# Patient Record
Sex: MALE | Race: WHITE | Employment: OTHER | URBAN - METROPOLITAN AREA
[De-identification: names, ages, dates, MRNs, and addresses within clinical notes are randomized per-mention and may not be internally consistent; named-entity substitution may affect disease eponyms.]

---

## 2017-07-28 ENCOUNTER — HOSPITAL ENCOUNTER (OUTPATIENT)
Dept: MRI IMAGING | Age: 72
Discharge: HOME | End: 2017-07-28
Attending: GENERAL PRACTICE | Admitting: GENERAL PRACTICE

## 2017-07-28 ENCOUNTER — NURSE ONLY (OUTPATIENT)
Dept: CARDIOLOGY CLINIC | Age: 72
End: 2017-07-28

## 2017-07-28 DIAGNOSIS — I10 ESSENTIAL HYPERTENSION: Primary | ICD-10-CM

## 2017-07-28 DIAGNOSIS — E78.5 HYPERLIPIDEMIA, UNSPECIFIED HYPERLIPIDEMIA TYPE: ICD-10-CM

## 2017-07-28 DIAGNOSIS — R42 VERTIGO: ICD-10-CM

## 2017-07-28 PROCEDURE — 93225 XTRNL ECG REC<48 HRS REC: CPT | Performed by: INTERNAL MEDICINE

## 2017-08-03 PROCEDURE — 93227 XTRNL ECG REC<48 HR R&I: CPT | Performed by: INTERNAL MEDICINE

## 2017-08-10 ENCOUNTER — TELEPHONE (OUTPATIENT)
Dept: CARDIOLOGY CLINIC | Age: 72
End: 2017-08-10

## 2017-08-22 ENCOUNTER — TELEPHONE (OUTPATIENT)
Dept: CARDIOLOGY CLINIC | Age: 72
End: 2017-08-22

## 2017-11-23 PROBLEM — I82.401 ACUTE DEEP VEIN THROMBOSIS (DVT) OF RIGHT LOWER EXTREMITY (HCC): Status: ACTIVE | Noted: 2017-11-23

## 2017-11-25 PROBLEM — L03.211 FACIAL CELLULITIS: Status: ACTIVE | Noted: 2017-11-25

## 2017-11-25 PROBLEM — B02.21 HERPES ZOSTER OTICUS: Status: ACTIVE | Noted: 2017-11-25

## 2018-07-11 PROBLEM — R07.9 CHEST PAIN: Status: ACTIVE | Noted: 2018-07-11

## 2018-11-08 ENCOUNTER — HOSPITAL ENCOUNTER (OUTPATIENT)
Dept: MRI IMAGING | Age: 73
Discharge: HOME | End: 2018-11-08
Payer: MEDICARE

## 2018-11-08 DIAGNOSIS — M75.82 TENDINITIS OF LEFT ROTATOR CUFF: ICD-10-CM

## 2018-11-08 PROCEDURE — 73221 MRI JOINT UPR EXTREM W/O DYE: CPT

## 2019-03-06 ENCOUNTER — APPOINTMENT (OUTPATIENT)
Dept: CT IMAGING | Age: 74
DRG: 301 | End: 2019-03-06
Payer: MEDICARE

## 2019-03-06 ENCOUNTER — APPOINTMENT (OUTPATIENT)
Dept: GENERAL RADIOLOGY | Age: 74
DRG: 301 | End: 2019-03-06
Payer: MEDICARE

## 2019-03-06 ENCOUNTER — APPOINTMENT (OUTPATIENT)
Dept: ULTRASOUND IMAGING | Age: 74
DRG: 301 | End: 2019-03-06
Payer: MEDICARE

## 2019-03-06 ENCOUNTER — HOSPITAL ENCOUNTER (EMERGENCY)
Age: 74
Discharge: HOME | DRG: 301 | End: 2019-03-06
Attending: EMERGENCY MEDICINE
Payer: MEDICARE

## 2019-03-06 VITALS
OXYGEN SATURATION: 97 % | SYSTOLIC BLOOD PRESSURE: 133 MMHG | WEIGHT: 202 LBS | RESPIRATION RATE: 18 BRPM | BODY MASS INDEX: 30.62 KG/M2 | HEIGHT: 68 IN | DIASTOLIC BLOOD PRESSURE: 70 MMHG | TEMPERATURE: 99.3 F | HEART RATE: 72 BPM

## 2019-03-06 DIAGNOSIS — M79.601 PAIN OF RIGHT UPPER EXTREMITY: ICD-10-CM

## 2019-03-06 DIAGNOSIS — M79.604 LEG PAIN, DIFFUSE, RIGHT: Primary | ICD-10-CM

## 2019-03-06 LAB
ALBUMIN SERPL-MCNC: 4 GM/DL (ref 3.4–5)
ALP BLD-CCNC: 71 IU/L (ref 40–129)
ALT SERPL-CCNC: 14 U/L (ref 10–40)
ANION GAP SERPL CALCULATED.3IONS-SCNC: 11 MMOL/L (ref 4–16)
APTT: 35 SECONDS (ref 21.2–33)
AST SERPL-CCNC: 14 IU/L (ref 15–37)
BASOPHILS ABSOLUTE: 0.3 K/CU MM
BASOPHILS RELATIVE PERCENT: 2.2 % (ref 0–1)
BILIRUB SERPL-MCNC: 0.5 MG/DL (ref 0–1)
BUN BLDV-MCNC: 8 MG/DL (ref 6–23)
CALCIUM SERPL-MCNC: 8.5 MG/DL (ref 8.3–10.6)
CHLORIDE BLD-SCNC: 92 MMOL/L (ref 99–110)
CO2: 29 MMOL/L (ref 21–32)
CREAT SERPL-MCNC: 0.9 MG/DL (ref 0.9–1.3)
DIFFERENTIAL TYPE: ABNORMAL
EOSINOPHILS ABSOLUTE: 0.8 K/CU MM
EOSINOPHILS RELATIVE PERCENT: 5.3 % (ref 0–3)
GFR AFRICAN AMERICAN: >60 ML/MIN/1.73M2
GFR NON-AFRICAN AMERICAN: >60 ML/MIN/1.73M2
GLUCOSE BLD-MCNC: 112 MG/DL (ref 70–99)
HCT VFR BLD CALC: 43.5 % (ref 42–52)
HEMOGLOBIN: 14.5 GM/DL (ref 13.5–18)
IMMATURE NEUTROPHIL %: 2.4 % (ref 0–0.43)
INR BLD: 1.26 INDEX
LYMPHOCYTES ABSOLUTE: 1 K/CU MM
LYMPHOCYTES RELATIVE PERCENT: 6.7 % (ref 24–44)
MCH RBC QN AUTO: 29.8 PG (ref 27–31)
MCHC RBC AUTO-ENTMCNC: 33.3 % (ref 32–36)
MCV RBC AUTO: 89.3 FL (ref 78–100)
MONOCYTES ABSOLUTE: 1 K/CU MM
MONOCYTES RELATIVE PERCENT: 6.7 % (ref 0–4)
NUCLEATED RBC %: 0 %
PDW BLD-RTO: 14.6 % (ref 11.7–14.9)
PLATELET # BLD: 369 K/CU MM (ref 140–440)
PMV BLD AUTO: 9.4 FL (ref 7.5–11.1)
POTASSIUM SERPL-SCNC: 3.8 MMOL/L (ref 3.5–5.1)
PROTHROMBIN TIME: 14.6 SECONDS (ref 9.12–12.5)
RBC # BLD: 4.87 M/CU MM (ref 4.6–6.2)
SEGMENTED NEUTROPHILS ABSOLUTE COUNT: 11.1 K/CU MM
SEGMENTED NEUTROPHILS RELATIVE PERCENT: 76.7 % (ref 36–66)
SODIUM BLD-SCNC: 132 MMOL/L (ref 135–145)
TOTAL IMMATURE NEUTOROPHIL: 0.35 K/CU MM
TOTAL NUCLEATED RBC: 0 K/CU MM
TOTAL PROTEIN: 6.4 GM/DL (ref 6.4–8.2)
TROPONIN T: <0.01 NG/ML
WBC # BLD: 14.4 K/CU MM (ref 4–10.5)

## 2019-03-06 PROCEDURE — 93971 EXTREMITY STUDY: CPT

## 2019-03-06 PROCEDURE — 36415 COLL VENOUS BLD VENIPUNCTURE: CPT

## 2019-03-06 PROCEDURE — 73090 X-RAY EXAM OF FOREARM: CPT

## 2019-03-06 PROCEDURE — 84484 ASSAY OF TROPONIN QUANT: CPT

## 2019-03-06 PROCEDURE — 85610 PROTHROMBIN TIME: CPT

## 2019-03-06 PROCEDURE — 71045 X-RAY EXAM CHEST 1 VIEW: CPT

## 2019-03-06 PROCEDURE — 6370000000 HC RX 637 (ALT 250 FOR IP): Performed by: EMERGENCY MEDICINE

## 2019-03-06 PROCEDURE — 80053 COMPREHEN METABOLIC PANEL: CPT

## 2019-03-06 PROCEDURE — 99284 EMERGENCY DEPT VISIT MOD MDM: CPT

## 2019-03-06 PROCEDURE — 93005 ELECTROCARDIOGRAM TRACING: CPT | Performed by: EMERGENCY MEDICINE

## 2019-03-06 PROCEDURE — 85730 THROMBOPLASTIN TIME PARTIAL: CPT

## 2019-03-06 PROCEDURE — 93010 ELECTROCARDIOGRAM REPORT: CPT | Performed by: INTERNAL MEDICINE

## 2019-03-06 PROCEDURE — 70450 CT HEAD/BRAIN W/O DYE: CPT

## 2019-03-06 PROCEDURE — 85025 COMPLETE CBC W/AUTO DIFF WBC: CPT

## 2019-03-06 RX ORDER — HYDROCODONE BITARTRATE AND ACETAMINOPHEN 5; 325 MG/1; MG/1
1 TABLET ORAL EVERY 4 HOURS PRN
Qty: 15 TABLET | Refills: 0 | Status: ON HOLD | OUTPATIENT
Start: 2019-03-06 | End: 2019-03-10 | Stop reason: HOSPADM

## 2019-03-06 RX ORDER — TRAMADOL HYDROCHLORIDE 50 MG/1
50 TABLET ORAL ONCE
Status: COMPLETED | OUTPATIENT
Start: 2019-03-06 | End: 2019-03-06

## 2019-03-06 RX ORDER — HYDROCODONE BITARTRATE AND ACETAMINOPHEN 5; 325 MG/1; MG/1
1 TABLET ORAL ONCE
Status: COMPLETED | OUTPATIENT
Start: 2019-03-06 | End: 2019-03-06

## 2019-03-06 RX ADMIN — TRAMADOL HYDROCHLORIDE 50 MG: 50 TABLET, FILM COATED ORAL at 17:56

## 2019-03-06 RX ADMIN — HYDROCODONE BITARTRATE AND ACETAMINOPHEN 1 TABLET: 5; 325 TABLET ORAL at 20:36

## 2019-03-06 ASSESSMENT — PAIN DESCRIPTION - ORIENTATION: ORIENTATION: RIGHT

## 2019-03-06 ASSESSMENT — PAIN SCALES - GENERAL
PAINLEVEL_OUTOF10: 8

## 2019-03-06 ASSESSMENT — PAIN DESCRIPTION - PAIN TYPE: TYPE: ACUTE PAIN

## 2019-03-08 ENCOUNTER — HOSPITAL ENCOUNTER (INPATIENT)
Age: 74
LOS: 2 days | Discharge: HOME | DRG: 301 | End: 2019-03-10
Attending: INTERNAL MEDICINE | Admitting: INTERNAL MEDICINE
Payer: MEDICARE

## 2019-03-08 DIAGNOSIS — M79.601 PAIN OF RIGHT UPPER EXTREMITY: ICD-10-CM

## 2019-03-08 DIAGNOSIS — M79.604 LEG PAIN, DIFFUSE, RIGHT: ICD-10-CM

## 2019-03-08 PROBLEM — I82.409 RECURRENT DEEP VENOUS THROMBOSIS (HCC): Status: ACTIVE | Noted: 2019-03-08

## 2019-03-08 PROCEDURE — 6360000002 HC RX W HCPCS: Performed by: INTERNAL MEDICINE

## 2019-03-08 PROCEDURE — 2140000000 HC CCU INTERMEDIATE R&B

## 2019-03-08 RX ORDER — METOPROLOL SUCCINATE 25 MG/1
25 TABLET, EXTENDED RELEASE ORAL DAILY
Status: DISCONTINUED | OUTPATIENT
Start: 2019-03-09 | End: 2019-03-10 | Stop reason: HOSPADM

## 2019-03-08 RX ORDER — HYDROCHLOROTHIAZIDE 25 MG/1
25 TABLET ORAL DAILY
Status: DISCONTINUED | OUTPATIENT
Start: 2019-03-09 | End: 2019-03-10 | Stop reason: HOSPADM

## 2019-03-08 RX ORDER — PROBENECID 500 MG/1
500 TABLET, FILM COATED ORAL DAILY
Status: DISCONTINUED | OUTPATIENT
Start: 2019-03-09 | End: 2019-03-10 | Stop reason: HOSPADM

## 2019-03-08 RX ORDER — SODIUM CHLORIDE 0.9 % (FLUSH) 0.9 %
10 SYRINGE (ML) INJECTION EVERY 12 HOURS SCHEDULED
Status: DISCONTINUED | OUTPATIENT
Start: 2019-03-08 | End: 2019-03-10 | Stop reason: HOSPADM

## 2019-03-08 RX ORDER — AMLODIPINE BESYLATE 2.5 MG/1
2.5 TABLET ORAL DAILY
Status: DISCONTINUED | OUTPATIENT
Start: 2019-03-09 | End: 2019-03-10 | Stop reason: HOSPADM

## 2019-03-08 RX ORDER — PROBENECID AND COLCHICINE 500; .5 MG/1; MG/1
1 TABLET ORAL DAILY
Status: DISCONTINUED | OUTPATIENT
Start: 2019-03-08 | End: 2019-03-08 | Stop reason: CLARIF

## 2019-03-08 RX ORDER — PREDNISONE 10 MG/1
10 TABLET ORAL DAILY
Status: ON HOLD | COMMUNITY
End: 2019-03-10 | Stop reason: HOSPADM

## 2019-03-08 RX ORDER — COLCHICINE 0.6 MG/1
0.6 TABLET ORAL DAILY
Status: DISCONTINUED | OUTPATIENT
Start: 2019-03-09 | End: 2019-03-10 | Stop reason: HOSPADM

## 2019-03-08 RX ORDER — ONDANSETRON 2 MG/ML
4 INJECTION INTRAMUSCULAR; INTRAVENOUS EVERY 6 HOURS PRN
Status: DISCONTINUED | OUTPATIENT
Start: 2019-03-08 | End: 2019-03-10 | Stop reason: HOSPADM

## 2019-03-08 RX ORDER — POTASSIUM CHLORIDE 20 MEQ/1
20 TABLET, EXTENDED RELEASE ORAL 2 TIMES DAILY
Status: DISCONTINUED | OUTPATIENT
Start: 2019-03-08 | End: 2019-03-10 | Stop reason: HOSPADM

## 2019-03-08 RX ORDER — CARBAMAZEPINE 200 MG/1
200 TABLET ORAL 3 TIMES DAILY
Status: ON HOLD | COMMUNITY
End: 2020-08-09

## 2019-03-08 RX ORDER — HYDROCODONE BITARTRATE AND ACETAMINOPHEN 5; 325 MG/1; MG/1
1 TABLET ORAL EVERY 4 HOURS PRN
Status: DISCONTINUED | OUTPATIENT
Start: 2019-03-08 | End: 2019-03-10 | Stop reason: HOSPADM

## 2019-03-08 RX ORDER — SODIUM CHLORIDE 0.9 % (FLUSH) 0.9 %
10 SYRINGE (ML) INJECTION PRN
Status: DISCONTINUED | OUTPATIENT
Start: 2019-03-08 | End: 2019-03-10 | Stop reason: HOSPADM

## 2019-03-08 RX ORDER — PRAMIPEXOLE DIHYDROCHLORIDE 0.25 MG/1
0.5 TABLET ORAL DAILY
Status: DISCONTINUED | OUTPATIENT
Start: 2019-03-09 | End: 2019-03-10 | Stop reason: HOSPADM

## 2019-03-08 RX ORDER — INDOMETHACIN 25 MG/1
25 CAPSULE ORAL
Status: ON HOLD | COMMUNITY
End: 2019-03-10 | Stop reason: HOSPADM

## 2019-03-08 RX ADMIN — ENOXAPARIN SODIUM 90 MG: 100 INJECTION SUBCUTANEOUS at 21:28

## 2019-03-09 PROCEDURE — 6370000000 HC RX 637 (ALT 250 FOR IP): Performed by: INTERNAL MEDICINE

## 2019-03-09 PROCEDURE — 2580000003 HC RX 258: Performed by: INTERNAL MEDICINE

## 2019-03-09 PROCEDURE — 99223 1ST HOSP IP/OBS HIGH 75: CPT | Performed by: INTERNAL MEDICINE

## 2019-03-09 PROCEDURE — 2140000000 HC CCU INTERMEDIATE R&B

## 2019-03-09 PROCEDURE — 6360000002 HC RX W HCPCS: Performed by: INTERNAL MEDICINE

## 2019-03-09 RX ORDER — BENZONATATE 100 MG/1
100 CAPSULE ORAL 3 TIMES DAILY PRN
Status: DISCONTINUED | OUTPATIENT
Start: 2019-03-09 | End: 2019-03-10 | Stop reason: HOSPADM

## 2019-03-09 RX ORDER — PANTOPRAZOLE SODIUM 40 MG/1
40 TABLET, DELAYED RELEASE ORAL
Status: DISCONTINUED | OUTPATIENT
Start: 2019-03-10 | End: 2019-03-09

## 2019-03-09 RX ORDER — PANTOPRAZOLE SODIUM 40 MG/1
40 TABLET, DELAYED RELEASE ORAL
Status: DISCONTINUED | OUTPATIENT
Start: 2019-03-09 | End: 2019-03-10 | Stop reason: HOSPADM

## 2019-03-09 RX ADMIN — SODIUM CHLORIDE, PRESERVATIVE FREE 10 ML: 5 INJECTION INTRAVENOUS at 10:12

## 2019-03-09 RX ADMIN — PROBENECID 500 MG: 500 TABLET, FILM COATED ORAL at 10:20

## 2019-03-09 RX ADMIN — SODIUM CHLORIDE, PRESERVATIVE FREE 10 ML: 5 INJECTION INTRAVENOUS at 21:53

## 2019-03-09 RX ADMIN — POTASSIUM CHLORIDE 20 MEQ: 20 TABLET, EXTENDED RELEASE ORAL at 10:11

## 2019-03-09 RX ADMIN — PRAMIPEXOLE DIHYDROCHLORIDE 0.5 MG: 0.25 TABLET ORAL at 10:12

## 2019-03-09 RX ADMIN — PANTOPRAZOLE SODIUM 40 MG: 40 TABLET, DELAYED RELEASE ORAL at 18:54

## 2019-03-09 RX ADMIN — METOPROLOL SUCCINATE 25 MG: 25 TABLET, EXTENDED RELEASE ORAL at 10:11

## 2019-03-09 RX ADMIN — BENZONATATE 100 MG: 100 CAPSULE ORAL at 21:57

## 2019-03-09 RX ADMIN — ENOXAPARIN SODIUM 90 MG: 100 INJECTION SUBCUTANEOUS at 21:52

## 2019-03-09 RX ADMIN — POTASSIUM CHLORIDE 20 MEQ: 20 TABLET, EXTENDED RELEASE ORAL at 21:52

## 2019-03-09 RX ADMIN — ENOXAPARIN SODIUM 90 MG: 100 INJECTION SUBCUTANEOUS at 10:11

## 2019-03-09 RX ADMIN — HYDROCHLOROTHIAZIDE 25 MG: 25 TABLET ORAL at 10:11

## 2019-03-09 RX ADMIN — COLCHICINE 0.6 MG: 0.6 TABLET, FILM COATED ORAL at 10:11

## 2019-03-09 RX ADMIN — AMLODIPINE BESYLATE 2.5 MG: 2.5 TABLET ORAL at 10:20

## 2019-03-09 RX ADMIN — BENZONATATE 100 MG: 100 CAPSULE ORAL at 15:46

## 2019-03-09 ASSESSMENT — PAIN SCALES - GENERAL
PAINLEVEL_OUTOF10: 0

## 2019-03-10 ENCOUNTER — APPOINTMENT (OUTPATIENT)
Dept: CT IMAGING | Age: 74
DRG: 301 | End: 2019-03-10
Attending: INTERNAL MEDICINE
Payer: MEDICARE

## 2019-03-10 VITALS
HEIGHT: 68 IN | TEMPERATURE: 98.7 F | HEART RATE: 71 BPM | SYSTOLIC BLOOD PRESSURE: 137 MMHG | DIASTOLIC BLOOD PRESSURE: 74 MMHG | WEIGHT: 200.4 LBS | OXYGEN SATURATION: 98 % | RESPIRATION RATE: 17 BRPM | BODY MASS INDEX: 30.37 KG/M2

## 2019-03-10 LAB
ALBUMIN SERPL-MCNC: 3.9 GM/DL (ref 3.4–5)
ALP BLD-CCNC: 76 IU/L (ref 40–129)
ALT SERPL-CCNC: 26 U/L (ref 10–40)
ANION GAP SERPL CALCULATED.3IONS-SCNC: 11 MMOL/L (ref 4–16)
AST SERPL-CCNC: 19 IU/L (ref 15–37)
BILIRUB SERPL-MCNC: 1 MG/DL (ref 0–1)
BUN BLDV-MCNC: 13 MG/DL (ref 6–23)
CALCIUM SERPL-MCNC: 8.2 MG/DL (ref 8.3–10.6)
CHLORIDE BLD-SCNC: 95 MMOL/L (ref 99–110)
CO2: 29 MMOL/L (ref 21–32)
CREAT SERPL-MCNC: 1 MG/DL (ref 0.9–1.3)
GFR AFRICAN AMERICAN: >60 ML/MIN/1.73M2
GFR NON-AFRICAN AMERICAN: >60 ML/MIN/1.73M2
GLUCOSE BLD-MCNC: 125 MG/DL (ref 70–99)
HOMOCYSTEINE: 19.1 UMOL/L (ref 0–10)
POTASSIUM SERPL-SCNC: 3.9 MMOL/L (ref 3.5–5.1)
SODIUM BLD-SCNC: 135 MMOL/L (ref 135–145)
TOTAL PROTEIN: 6.2 GM/DL (ref 6.4–8.2)

## 2019-03-10 PROCEDURE — 80053 COMPREHEN METABOLIC PANEL: CPT

## 2019-03-10 PROCEDURE — 6360000004 HC RX CONTRAST MEDICATION: Performed by: INTERNAL MEDICINE

## 2019-03-10 PROCEDURE — 85303 CLOT INHIBIT PROT C ACTIVITY: CPT

## 2019-03-10 PROCEDURE — 6370000000 HC RX 637 (ALT 250 FOR IP): Performed by: INTERNAL MEDICINE

## 2019-03-10 PROCEDURE — 99239 HOSP IP/OBS DSCHRG MGMT >30: CPT | Performed by: INTERNAL MEDICINE

## 2019-03-10 PROCEDURE — 81291 MTHFR GENE: CPT

## 2019-03-10 PROCEDURE — 86146 BETA-2 GLYCOPROTEIN ANTIBODY: CPT

## 2019-03-10 PROCEDURE — 85302 CLOT INHIBIT PROT C ANTIGEN: CPT

## 2019-03-10 PROCEDURE — 2580000003 HC RX 258: Performed by: INTERNAL MEDICINE

## 2019-03-10 PROCEDURE — 81240 F2 GENE: CPT

## 2019-03-10 PROCEDURE — 83090 ASSAY OF HOMOCYSTEINE: CPT

## 2019-03-10 PROCEDURE — 81241 F5 GENE: CPT

## 2019-03-10 PROCEDURE — 6360000002 HC RX W HCPCS: Performed by: INTERNAL MEDICINE

## 2019-03-10 PROCEDURE — 85305 CLOT INHIBIT PROT S TOTAL: CPT

## 2019-03-10 PROCEDURE — 71260 CT THORAX DX C+: CPT

## 2019-03-10 PROCEDURE — 36415 COLL VENOUS BLD VENIPUNCTURE: CPT

## 2019-03-10 PROCEDURE — 86147 CARDIOLIPIN ANTIBODY EA IG: CPT

## 2019-03-10 RX ORDER — PANTOPRAZOLE SODIUM 40 MG/1
40 TABLET, DELAYED RELEASE ORAL
Qty: 30 TABLET | Refills: 3 | Status: ON HOLD | OUTPATIENT
Start: 2019-03-11 | End: 2020-08-09

## 2019-03-10 RX ORDER — WARFARIN SODIUM 5 MG/1
5 TABLET ORAL DAILY
Status: DISCONTINUED | OUTPATIENT
Start: 2019-03-10 | End: 2019-03-10 | Stop reason: HOSPADM

## 2019-03-10 RX ORDER — SODIUM CHLORIDE 0.9 % (FLUSH) 0.9 %
10 SYRINGE (ML) INJECTION 2 TIMES DAILY
Status: DISCONTINUED | OUTPATIENT
Start: 2019-03-10 | End: 2019-03-10 | Stop reason: HOSPADM

## 2019-03-10 RX ORDER — WARFARIN SODIUM 5 MG/1
5 TABLET ORAL DAILY
Qty: 7 TABLET | Refills: 0 | Status: SHIPPED | OUTPATIENT
Start: 2019-03-10 | End: 2019-10-17

## 2019-03-10 RX ADMIN — PANTOPRAZOLE SODIUM 40 MG: 40 TABLET, DELAYED RELEASE ORAL at 06:12

## 2019-03-10 RX ADMIN — METOPROLOL SUCCINATE 25 MG: 25 TABLET, EXTENDED RELEASE ORAL at 09:51

## 2019-03-10 RX ADMIN — IOPAMIDOL 75 ML: 755 INJECTION, SOLUTION INTRAVENOUS at 12:31

## 2019-03-10 RX ADMIN — BENZONATATE 100 MG: 100 CAPSULE ORAL at 11:38

## 2019-03-10 RX ADMIN — PRAMIPEXOLE DIHYDROCHLORIDE 0.5 MG: 0.25 TABLET ORAL at 09:51

## 2019-03-10 RX ADMIN — PROBENECID 500 MG: 500 TABLET, FILM COATED ORAL at 09:52

## 2019-03-10 RX ADMIN — AMLODIPINE BESYLATE 2.5 MG: 2.5 TABLET ORAL at 09:52

## 2019-03-10 RX ADMIN — SODIUM CHLORIDE, PRESERVATIVE FREE 10 ML: 5 INJECTION INTRAVENOUS at 09:52

## 2019-03-10 RX ADMIN — SODIUM CHLORIDE, PRESERVATIVE FREE 10 ML: 5 INJECTION INTRAVENOUS at 12:32

## 2019-03-10 RX ADMIN — COLCHICINE 0.6 MG: 0.6 TABLET, FILM COATED ORAL at 09:51

## 2019-03-10 RX ADMIN — POTASSIUM CHLORIDE 20 MEQ: 20 TABLET, EXTENDED RELEASE ORAL at 09:51

## 2019-03-10 RX ADMIN — HYDROCHLOROTHIAZIDE 25 MG: 25 TABLET ORAL at 09:51

## 2019-03-10 RX ADMIN — ENOXAPARIN SODIUM 90 MG: 100 INJECTION SUBCUTANEOUS at 09:51

## 2019-03-10 ASSESSMENT — PAIN SCALES - GENERAL
PAINLEVEL_OUTOF10: 0

## 2019-03-12 LAB
ANTICARDIOLIPIN IGA ANTIBODY: 2 APL (ref 0–11)
ANTICARDIOLIPIN IGA ANTIBODY: ABNORMAL APL (ref 0–11)
ANTICARDIOLIPIN IGG ANTIBODY: 0 GPL (ref 0–14)
ANTICARDIOLIPIN IGG ANTIBODY: ABNORMAL GPL (ref 0–14)
BETA 2 GLYCOPROT.1 IGA AB: 3 SAU (ref 0–20)
BETA 2 GLYCOPROT.1 IGA AB: NORMAL SAU (ref 0–20)
BETA 2 GLYCOPROT.1 IGM AB: 0 SMU (ref 0–20)
BETA 2 GLYCOPROT.1 IGM AB: NORMAL SMU (ref 0–20)
BETA-2 GLYCOPROTEIN 1 IGG ANTIBODY: 13 SGU (ref 0–20)
CARDIOLIPIN AB IGM: 0 MPL (ref 0–12)
CARDIOLIPIN AB IGM: ABNORMAL MPL (ref 0–12)
PROTEIN C ACTIVITY: 119 % (ref 83–168)
PROTEIN C ACTIVITY: NORMAL % (ref 83–168)
PROTEIN C ANTIGEN: 86 % (ref 63–153)
PROTEIN C ANTIGEN: NORMAL % (ref 63–153)
PROTEIN S ACTIVITY: 75 % (ref 66–143)
PROTEIN S ACTIVITY: NORMAL % (ref 66–143)

## 2019-03-14 LAB
FACTOR V LEIDEN: NEGATIVE
FACTOR V LEIDEN: NORMAL

## 2019-03-15 LAB
MTHFR BY PCR SPECIMEN: NORMAL
MTHFR INTERPRETATION: NORMAL
MTHFR INTERPRETATION: NORMAL
MTHFR MUTATION A1298C: NORMAL
MTHFR MUTATION C677T: NEGATIVE

## 2019-03-17 LAB
PROTHROMBIN G20210A MUTATION: NEGATIVE
PROTHROMBIN G20210A MUTATION: NORMAL

## 2019-03-19 LAB
EKG ATRIAL RATE: 70 BPM
EKG DIAGNOSIS: NORMAL
EKG P AXIS: 26 DEGREES
EKG P-R INTERVAL: 214 MS
EKG Q-T INTERVAL: 414 MS
EKG QRS DURATION: 98 MS
EKG QTC CALCULATION (BAZETT): 447 MS
EKG R AXIS: -76 DEGREES
EKG T AXIS: 67 DEGREES
EKG VENTRICULAR RATE: 70 BPM

## 2019-10-19 ENCOUNTER — ANESTHESIA EVENT (OUTPATIENT)
Dept: OPERATING ROOM | Age: 74
End: 2019-10-19
Payer: MEDICARE

## 2019-10-22 ENCOUNTER — ANESTHESIA (OUTPATIENT)
Dept: OPERATING ROOM | Age: 74
End: 2019-10-22
Payer: MEDICARE

## 2019-10-22 ENCOUNTER — HOSPITAL ENCOUNTER (OUTPATIENT)
Age: 74
Setting detail: OUTPATIENT SURGERY
Discharge: HOME | End: 2019-10-22
Attending: SPECIALIST | Admitting: SPECIALIST
Payer: MEDICARE

## 2019-10-22 VITALS
WEIGHT: 205 LBS | HEART RATE: 74 BPM | RESPIRATION RATE: 16 BRPM | OXYGEN SATURATION: 94 % | DIASTOLIC BLOOD PRESSURE: 74 MMHG | SYSTOLIC BLOOD PRESSURE: 132 MMHG | BODY MASS INDEX: 31.07 KG/M2 | TEMPERATURE: 97.5 F | HEIGHT: 68 IN

## 2019-10-22 VITALS — SYSTOLIC BLOOD PRESSURE: 126 MMHG | OXYGEN SATURATION: 99 % | DIASTOLIC BLOOD PRESSURE: 66 MMHG

## 2019-10-22 PROCEDURE — 2500000003 HC RX 250 WO HCPCS: Performed by: NURSE ANESTHETIST, CERTIFIED REGISTERED

## 2019-10-22 PROCEDURE — 93005 ELECTROCARDIOGRAM TRACING: CPT | Performed by: ANESTHESIOLOGY

## 2019-10-22 PROCEDURE — 7100000010 HC PHASE II RECOVERY - FIRST 15 MIN: Performed by: SPECIALIST

## 2019-10-22 PROCEDURE — 3700000000 HC ANESTHESIA ATTENDED CARE: Performed by: SPECIALIST

## 2019-10-22 PROCEDURE — 2580000003 HC RX 258: Performed by: SPECIALIST

## 2019-10-22 PROCEDURE — 6360000002 HC RX W HCPCS: Performed by: NURSE ANESTHETIST, CERTIFIED REGISTERED

## 2019-10-22 PROCEDURE — 2709999900 HC NON-CHARGEABLE SUPPLY: Performed by: SPECIALIST

## 2019-10-22 PROCEDURE — 3609027000 HC COLONOSCOPY: Performed by: SPECIALIST

## 2019-10-22 PROCEDURE — 7100000011 HC PHASE II RECOVERY - ADDTL 15 MIN: Performed by: SPECIALIST

## 2019-10-22 PROCEDURE — 3700000001 HC ADD 15 MINUTES (ANESTHESIA): Performed by: SPECIALIST

## 2019-10-22 RX ORDER — LIDOCAINE HYDROCHLORIDE 20 MG/ML
INJECTION, SOLUTION INFILTRATION; PERINEURAL PRN
Status: DISCONTINUED | OUTPATIENT
Start: 2019-10-22 | End: 2019-10-22 | Stop reason: SDUPTHER

## 2019-10-22 RX ORDER — SODIUM CHLORIDE, SODIUM LACTATE, POTASSIUM CHLORIDE, CALCIUM CHLORIDE 600; 310; 30; 20 MG/100ML; MG/100ML; MG/100ML; MG/100ML
INJECTION, SOLUTION INTRAVENOUS CONTINUOUS
Status: DISCONTINUED | OUTPATIENT
Start: 2019-10-22 | End: 2019-10-22 | Stop reason: HOSPADM

## 2019-10-22 RX ORDER — PROPOFOL 10 MG/ML
INJECTION, EMULSION INTRAVENOUS PRN
Status: DISCONTINUED | OUTPATIENT
Start: 2019-10-22 | End: 2019-10-22 | Stop reason: SDUPTHER

## 2019-10-22 RX ADMIN — PROPOFOL 250 MG: 10 INJECTION, EMULSION INTRAVENOUS at 11:44

## 2019-10-22 RX ADMIN — LIDOCAINE HYDROCHLORIDE 100 MG: 20 INJECTION, SOLUTION INFILTRATION; PERINEURAL at 11:44

## 2019-10-22 RX ADMIN — SODIUM CHLORIDE, POTASSIUM CHLORIDE, SODIUM LACTATE AND CALCIUM CHLORIDE: 600; 310; 30; 20 INJECTION, SOLUTION INTRAVENOUS at 10:48

## 2019-10-22 ASSESSMENT — PAIN SCALES - GENERAL
PAINLEVEL_OUTOF10: 0
PAINLEVEL_OUTOF10: 0

## 2019-10-22 ASSESSMENT — PAIN - FUNCTIONAL ASSESSMENT: PAIN_FUNCTIONAL_ASSESSMENT: 0-10

## 2020-01-07 ENCOUNTER — APPOINTMENT (OUTPATIENT)
Dept: CT IMAGING | Age: 75
End: 2020-01-07
Payer: MEDICARE

## 2020-01-07 ENCOUNTER — HOSPITAL ENCOUNTER (EMERGENCY)
Age: 75
Discharge: HOME | End: 2020-01-07
Attending: EMERGENCY MEDICINE
Payer: MEDICARE

## 2020-01-07 VITALS
SYSTOLIC BLOOD PRESSURE: 134 MMHG | BODY MASS INDEX: 31.07 KG/M2 | RESPIRATION RATE: 18 BRPM | WEIGHT: 205 LBS | OXYGEN SATURATION: 98 % | HEIGHT: 68 IN | HEART RATE: 85 BPM | TEMPERATURE: 98.3 F | DIASTOLIC BLOOD PRESSURE: 68 MMHG

## 2020-01-07 LAB
ALBUMIN SERPL-MCNC: 4.5 GM/DL (ref 3.4–5)
ALP BLD-CCNC: 92 IU/L (ref 40–128)
ALT SERPL-CCNC: 28 U/L (ref 10–40)
ANION GAP SERPL CALCULATED.3IONS-SCNC: 15 MMOL/L (ref 4–16)
AST SERPL-CCNC: 27 IU/L (ref 15–37)
BACTERIA: NEGATIVE /HPF
BASOPHILS ABSOLUTE: 0.2 K/CU MM
BASOPHILS RELATIVE PERCENT: 1.2 % (ref 0–1)
BILIRUB SERPL-MCNC: 2 MG/DL (ref 0–1)
BILIRUBIN URINE: NEGATIVE MG/DL
BLOOD, URINE: ABNORMAL
BUN BLDV-MCNC: 9 MG/DL (ref 6–23)
CALCIUM SERPL-MCNC: 9.4 MG/DL (ref 8.3–10.6)
CHLORIDE BLD-SCNC: 99 MMOL/L (ref 99–110)
CLARITY: CLEAR
CO2: 25 MMOL/L (ref 21–32)
COLOR: YELLOW
CREAT SERPL-MCNC: 1 MG/DL (ref 0.9–1.3)
DIFFERENTIAL TYPE: ABNORMAL
EOSINOPHILS ABSOLUTE: 0 K/CU MM
EOSINOPHILS RELATIVE PERCENT: 0.1 % (ref 0–3)
GFR AFRICAN AMERICAN: >60 ML/MIN/1.73M2
GFR NON-AFRICAN AMERICAN: >60 ML/MIN/1.73M2
GLUCOSE BLD-MCNC: 176 MG/DL (ref 70–99)
GLUCOSE, URINE: NEGATIVE MG/DL
GRANULAR CASTS: 1 /LPF
HCT VFR BLD CALC: 53.8 % (ref 42–52)
HEMOGLOBIN: 17.2 GM/DL (ref 13.5–18)
HYALINE CASTS: 0 /LPF
IMMATURE NEUTROPHIL %: 2.4 % (ref 0–0.43)
KETONES, URINE: NEGATIVE MG/DL
LEUKOCYTE ESTERASE, URINE: NEGATIVE
LYMPHOCYTES ABSOLUTE: 0.5 K/CU MM
LYMPHOCYTES RELATIVE PERCENT: 2.5 % (ref 24–44)
MCH RBC QN AUTO: 28.6 PG (ref 27–31)
MCHC RBC AUTO-ENTMCNC: 32 % (ref 32–36)
MCV RBC AUTO: 89.4 FL (ref 78–100)
MONOCYTES ABSOLUTE: 0.2 K/CU MM
MONOCYTES RELATIVE PERCENT: 1.3 % (ref 0–4)
NITRITE URINE, QUANTITATIVE: NEGATIVE
NUCLEATED RBC %: 0 %
PDW BLD-RTO: 16.7 % (ref 11.7–14.9)
PH, URINE: 7 (ref 5–8)
PLATELET # BLD: 389 K/CU MM (ref 140–440)
PMV BLD AUTO: 9.7 FL (ref 7.5–11.1)
POTASSIUM SERPL-SCNC: 4.1 MMOL/L (ref 3.5–5.1)
PROTEIN UA: NEGATIVE MG/DL
RBC # BLD: 6.02 M/CU MM (ref 4.6–6.2)
RBC URINE: 29 /HPF (ref 0–3)
SEGMENTED NEUTROPHILS ABSOLUTE COUNT: 16.6 K/CU MM
SEGMENTED NEUTROPHILS RELATIVE PERCENT: 92.5 % (ref 36–66)
SODIUM BLD-SCNC: 139 MMOL/L (ref 135–145)
SPECIFIC GRAVITY UA: 1.01 (ref 1–1.03)
TOTAL IMMATURE NEUTOROPHIL: 0.44 K/CU MM
TOTAL NUCLEATED RBC: 0 K/CU MM
TOTAL PROTEIN: 7.5 GM/DL (ref 6.4–8.2)
TRICHOMONAS: ABNORMAL /HPF
UROBILINOGEN, URINE: NORMAL MG/DL (ref 0.2–1)
WBC # BLD: 18 K/CU MM (ref 4–10.5)
WBC UA: <1 /HPF (ref 0–2)

## 2020-01-07 PROCEDURE — 36415 COLL VENOUS BLD VENIPUNCTURE: CPT

## 2020-01-07 PROCEDURE — 80053 COMPREHEN METABOLIC PANEL: CPT

## 2020-01-07 PROCEDURE — 4500000027

## 2020-01-07 PROCEDURE — 74176 CT ABD & PELVIS W/O CONTRAST: CPT

## 2020-01-07 PROCEDURE — 6370000000 HC RX 637 (ALT 250 FOR IP): Performed by: EMERGENCY MEDICINE

## 2020-01-07 PROCEDURE — 51798 US URINE CAPACITY MEASURE: CPT

## 2020-01-07 PROCEDURE — 85025 COMPLETE CBC W/AUTO DIFF WBC: CPT

## 2020-01-07 PROCEDURE — 99284 EMERGENCY DEPT VISIT MOD MDM: CPT

## 2020-01-07 PROCEDURE — 81001 URINALYSIS AUTO W/SCOPE: CPT

## 2020-01-07 RX ORDER — TAMSULOSIN HYDROCHLORIDE 0.4 MG/1
0.4 CAPSULE ORAL DAILY
Qty: 30 CAPSULE | Refills: 0 | Status: ON HOLD | OUTPATIENT
Start: 2020-01-07 | End: 2020-08-09

## 2020-01-07 RX ORDER — CEPHALEXIN 500 MG/1
500 CAPSULE ORAL 2 TIMES DAILY
Qty: 14 CAPSULE | Refills: 0 | Status: SHIPPED | OUTPATIENT
Start: 2020-01-07 | End: 2020-01-14

## 2020-01-07 RX ORDER — TAMSULOSIN HYDROCHLORIDE 0.4 MG/1
0.4 CAPSULE ORAL DAILY
Status: DISCONTINUED | OUTPATIENT
Start: 2020-01-07 | End: 2020-01-07 | Stop reason: HOSPADM

## 2020-01-07 RX ORDER — CEPHALEXIN 250 MG/1
500 CAPSULE ORAL ONCE
Status: COMPLETED | OUTPATIENT
Start: 2020-01-07 | End: 2020-01-07

## 2020-01-07 RX ADMIN — TAMSULOSIN HYDROCHLORIDE 0.4 MG: 0.4 CAPSULE ORAL at 20:56

## 2020-01-07 RX ADMIN — CEPHALEXIN 500 MG: 250 CAPSULE ORAL at 20:56

## 2020-01-07 ASSESSMENT — PAIN DESCRIPTION - DESCRIPTORS: DESCRIPTORS: PRESSURE

## 2020-01-07 ASSESSMENT — PAIN DESCRIPTION - PAIN TYPE: TYPE: ACUTE PAIN

## 2020-01-07 ASSESSMENT — PAIN DESCRIPTION - ORIENTATION: ORIENTATION: LOWER;MID

## 2020-01-07 ASSESSMENT — PAIN DESCRIPTION - LOCATION: LOCATION: ABDOMEN

## 2020-01-07 ASSESSMENT — PAIN SCALES - GENERAL: PAINLEVEL_OUTOF10: 7

## 2020-01-08 NOTE — ED PROVIDER NOTES
Triage Chief Complaint:   Urinary Retention (unable to urinate since 0530, surgery today and surgeon referred to ED)      [place]:  Troy Bowens is a 76 y.o. male that presents to the ER with urinary retention since 5:30am. States he had some anesthesia this AM to have a facial stimulator placed for postherpetic neuralgia. States he has been unable to urinate since then. Denies that this has happened to him before. Has not required rosenbaum cath placement in the past. States he has pressure in his lower abdomen, 7 out of 10. Nothing makes this better or worse. NO fevers or chills. Ami Rayr having a history of prostate problems. Past Medical History:   Diagnosis Date    DVT (deep venous thrombosis) ([facility] [address]     History of Holter monitoring 07/28/2017    Rhythm is sinus    Hx of blood clots     bilateral legs in 2018    Hypertension     Shingles      Past Surgical History:   Procedure Laterality Date    APPENDECTOMY      CHOLECYSTECTOMY      COLONOSCOPY  10/22/2019    grade 1 internal hemorrhoids    COLONOSCOPY N/A 10/22/2019    COLONOSCOPY DIAGNOSTIC performed by Bonita Lang MD at [address] [place] [address]       History reviewed. No pertinent family history.   Social History     Socioeconomic History    Marital status:      Spouse name: Not on file    Number of children: Not on file    Years of education: Not on file    Highest education level: Not on file   Occupational History    Not on file   Social Needs    Financial resource strain: Not on file    Food insecurity:     Worry: Not on file     Inability: Not on file    Transportation needs:     Medical: Not on file     Non-medical: Not on file   Tobacco Use    Smoking status: Never Smoker    Smokeless tobacco: Never Used   Substance and Sexual Activity    Alcohol use: No    Drug use: No    Sexual activity: Not on file   Lifestyle    Physical activity:     Days per week: Not on file     Minutes per session: Not on Radiographs:  [] Radiologist's Wet Read Report Reviewed:     [] Discussed with Radiologist:     [] The following radiograph was interpreted by myself in the absence of a radiologist:     EKG: (All EKG's are interpreted by myself in the absence of a cardiologist)      MDM:  Patient found to be hypertensive in triage, heart rate to 103, likely from discomfort. AFebrile. Sats normal. Bladder scan immediately down as soon as patient got to room. Bladder scan shows over 1 L. . Leone catheter placed by RN. Large amount of clear yellow urine did drain. CT scan shows moderate prostatomegaly with mild fullness of bilateral urinary collecting systems likely secondary to initial obstruction. Questionable punctate calculus at right UVJ. Patient not having any flank pain. Appears comfortable after bladder was drained. Did give a dose of Flomax in ED. Urinalysis shows negative nitrites, negative leuks. 29 RBCs, no WBCs no bacteria. Did order a dose of Keflex secondary to Leone placement. Did discuss with patient and wife that we were going to switch him to a leg bag and show him how to use it. Discussed enlarged prostate and need for Flomax. Recommended called urology office first thing tomorrow morning to get a follow-up appointment for catheter removal and reevaluation. Wife states after his procedure this morning he was given a pain medication and a an antibiotic that she has filled at home. Did discuss with her to call the ED and speak to me when she gets home and tell me which antibiotic he is on so I can inform her whether or not to fill the Keflex. Return to ED if worsens    Clinical Impression:  1. Urinary retention    2.  Enlarged prostate        Disposition Vitals:  [unfilled], [unfilled], [unfilled], [unfilled]    Disposition referral (if applicable):  Ute Santos MD  [facility], [facility]          [place], [address] [place] [address] [address] [facility]  [phone]    Schedule an appointment as soon as possible for a visit   FOllow up with urology for further eval and catheter removal.      Disposition medications (if applicable):  New Prescriptions    CEPHALEXIN (KEFLEX) 500 MG CAPSULE    Take 1 capsule by mouth 2 times daily for 7 days    TAMSULOSIN (FLOMAX) 0.4 MG CAPSULE    Take 1 capsule by mouth daily for 30 doses         (Please note that portions of this note may have been completed with a voice recognition program. Efforts were made to edit the dictations but occasionally words are mis-transcribed.)    MD Maria Luisa Leung MD  01/07/20 9473

## 2020-08-09 ENCOUNTER — APPOINTMENT (OUTPATIENT)
Dept: GENERAL RADIOLOGY | Age: 75
End: 2020-08-09
Payer: MEDICARE

## 2020-08-09 ENCOUNTER — HOSPITAL ENCOUNTER (OUTPATIENT)
Age: 75
Setting detail: OBSERVATION
Discharge: HOME | End: 2020-08-12
Attending: FAMILY MEDICINE | Admitting: FAMILY MEDICINE
Payer: MEDICARE

## 2020-08-09 PROBLEM — R06.02 SHORTNESS OF BREATH: Status: ACTIVE | Noted: 2020-08-09

## 2020-08-09 LAB
ADENOVIRUS DETECTION BY PCR: NOT DETECTED
ALBUMIN SERPL-MCNC: 3.6 GM/DL (ref 3.4–5)
ALP BLD-CCNC: 107 IU/L (ref 40–129)
ALT SERPL-CCNC: 19 U/L (ref 10–40)
ANION GAP SERPL CALCULATED.3IONS-SCNC: 11 MMOL/L (ref 4–16)
APTT: 35.7 SECONDS (ref 25.1–37.1)
AST SERPL-CCNC: 31 IU/L (ref 15–37)
BACTERIA: NEGATIVE /HPF
BASOPHILS ABSOLUTE: 0.4 K/CU MM
BASOPHILS RELATIVE PERCENT: 2.3 % (ref 0–1)
BILIRUB SERPL-MCNC: 2.4 MG/DL (ref 0–1)
BILIRUBIN URINE: NEGATIVE MG/DL
BLOOD, URINE: NEGATIVE
BORDETELLA PARAPERTUSSIS BY PCR: NOT DETECTED
BORDETELLA PERTUSSIS PCR: NOT DETECTED
BUN BLDV-MCNC: 9 MG/DL (ref 6–23)
CALCIUM SERPL-MCNC: 8.8 MG/DL (ref 8.3–10.6)
CHLAMYDOPHILA PNEUMONIA PCR: NOT DETECTED
CHLORIDE BLD-SCNC: 100 MMOL/L (ref 99–110)
CLARITY: CLEAR
CO2: 26 MMOL/L (ref 21–32)
COLOR: ABNORMAL
CORONAVIRUS 229E PCR: NOT DETECTED
CORONAVIRUS HKU1 PCR: NOT DETECTED
CORONAVIRUS NL63 PCR: NOT DETECTED
CORONAVIRUS OC43 PCR: NOT DETECTED
CREAT SERPL-MCNC: 1 MG/DL (ref 0.9–1.3)
D DIMER: 968 NG/ML(DDU)
DIFFERENTIAL TYPE: ABNORMAL
EKG ATRIAL RATE: 94 BPM
EKG DIAGNOSIS: NORMAL
EKG P AXIS: 41 DEGREES
EKG P-R INTERVAL: 192 MS
EKG Q-T INTERVAL: 370 MS
EKG QRS DURATION: 92 MS
EKG QTC CALCULATION (BAZETT): 462 MS
EKG R AXIS: 268 DEGREES
EKG T AXIS: 54 DEGREES
EKG VENTRICULAR RATE: 94 BPM
EOSINOPHILS ABSOLUTE: 0.6 K/CU MM
EOSINOPHILS RELATIVE PERCENT: 3.7 % (ref 0–3)
FERRITIN: 233 NG/ML (ref 30–400)
FIBRINOGEN LEVEL: 526 MG/DL (ref 196.9–442.1)
GFR AFRICAN AMERICAN: >60 ML/MIN/1.73M2
GFR NON-AFRICAN AMERICAN: >60 ML/MIN/1.73M2
GLUCOSE BLD-MCNC: 110 MG/DL (ref 70–99)
GLUCOSE, URINE: NEGATIVE MG/DL
HCT VFR BLD CALC: 49.9 % (ref 42–52)
HEMOGLOBIN: 16.6 GM/DL (ref 13.5–18)
HUMAN METAPNEUMOVIRUS PCR: NOT DETECTED
IMMATURE NEUTROPHIL %: 1.8 % (ref 0–0.43)
INFLUENZA A BY PCR: NOT DETECTED
INFLUENZA A H1 (2009) PCR: NOT DETECTED
INFLUENZA A H1 PANDEMIC PCR: NOT DETECTED
INFLUENZA A H3 PCR: NOT DETECTED
INFLUENZA B BY PCR: NOT DETECTED
INR BLD: 1.37 INDEX
KETONES, URINE: ABNORMAL MG/DL
LACTATE DEHYDROGENASE: 425 IU/L (ref 120–246)
LACTATE: 1.1 MMOL/L (ref 0.4–2)
LEUKOCYTE ESTERASE, URINE: NEGATIVE
LYMPHOCYTES ABSOLUTE: 0.8 K/CU MM
LYMPHOCYTES RELATIVE PERCENT: 5 % (ref 24–44)
MCH RBC QN AUTO: 28.6 PG (ref 27–31)
MCHC RBC AUTO-ENTMCNC: 33.3 % (ref 32–36)
MCV RBC AUTO: 85.9 FL (ref 78–100)
MONOCYTES ABSOLUTE: 1.1 K/CU MM
MONOCYTES RELATIVE PERCENT: 6.9 % (ref 0–4)
MUCUS: ABNORMAL HPF
MYCOPLASMA PNEUMONIAE PCR: NOT DETECTED
NITRITE URINE, QUANTITATIVE: NEGATIVE
NUCLEATED RBC %: 0 %
PARAINFLUENZA 1 PCR: NOT DETECTED
PARAINFLUENZA 2 PCR: NOT DETECTED
PARAINFLUENZA 3 PCR: NOT DETECTED
PARAINFLUENZA 4 PCR: NOT DETECTED
PDW BLD-RTO: 15.1 % (ref 11.7–14.9)
PH, URINE: 6 (ref 5–8)
PLATELET # BLD: 173 K/CU MM (ref 140–440)
PMV BLD AUTO: 10.8 FL (ref 7.5–11.1)
POTASSIUM SERPL-SCNC: 3.7 MMOL/L (ref 3.5–5.1)
PRO-BNP: 2683 PG/ML
PROTEIN UA: 30 MG/DL
PROTHROMBIN TIME: 16.6 SECONDS (ref 11.7–14.5)
RBC # BLD: 5.81 M/CU MM (ref 4.6–6.2)
RBC URINE: ABNORMAL /HPF (ref 0–3)
RENAL EPITHELIAL, UA: <1 /HPF
RHINOVIRUS ENTEROVIRUS PCR: NOT DETECTED
RSV PCR: NOT DETECTED
SEGMENTED NEUTROPHILS ABSOLUTE COUNT: 13.2 K/CU MM
SEGMENTED NEUTROPHILS RELATIVE PERCENT: 80.3 % (ref 36–66)
SODIUM BLD-SCNC: 137 MMOL/L (ref 135–145)
SPECIFIC GRAVITY UA: 1.02 (ref 1–1.03)
TOTAL IMMATURE NEUTOROPHIL: 0.29 K/CU MM
TOTAL NUCLEATED RBC: 0 K/CU MM
TOTAL PROTEIN: 6.6 GM/DL (ref 6.4–8.2)
TRICHOMONAS: ABNORMAL /HPF
TROPONIN T: 0.04 NG/ML
TROPONIN T: 0.04 NG/ML
UROBILINOGEN, URINE: 2 MG/DL (ref 0.2–1)
WBC # BLD: 16.4 K/CU MM (ref 4–10.5)
WBC UA: 1 /HPF (ref 0–2)

## 2020-08-09 PROCEDURE — 80053 COMPREHEN METABOLIC PANEL: CPT

## 2020-08-09 PROCEDURE — 81001 URINALYSIS AUTO W/SCOPE: CPT

## 2020-08-09 PROCEDURE — 96365 THER/PROPH/DIAG IV INF INIT: CPT

## 2020-08-09 PROCEDURE — 6370000000 HC RX 637 (ALT 250 FOR IP): Performed by: FAMILY MEDICINE

## 2020-08-09 PROCEDURE — 85610 PROTHROMBIN TIME: CPT

## 2020-08-09 PROCEDURE — G0378 HOSPITAL OBSERVATION PER HR: HCPCS

## 2020-08-09 PROCEDURE — 83605 ASSAY OF LACTIC ACID: CPT

## 2020-08-09 PROCEDURE — 87798 DETECT AGENT NOS DNA AMP: CPT

## 2020-08-09 PROCEDURE — 83880 ASSAY OF NATRIURETIC PEPTIDE: CPT

## 2020-08-09 PROCEDURE — 94761 N-INVAS EAR/PLS OXIMETRY MLT: CPT

## 2020-08-09 PROCEDURE — 87040 BLOOD CULTURE FOR BACTERIA: CPT

## 2020-08-09 PROCEDURE — 2700000000 HC OXYGEN THERAPY PER DAY

## 2020-08-09 PROCEDURE — 1200000000 HC SEMI PRIVATE

## 2020-08-09 PROCEDURE — 96372 THER/PROPH/DIAG INJ SC/IM: CPT

## 2020-08-09 PROCEDURE — 84484 ASSAY OF TROPONIN QUANT: CPT

## 2020-08-09 PROCEDURE — 6360000002 HC RX W HCPCS: Performed by: FAMILY MEDICINE

## 2020-08-09 PROCEDURE — 85379 FIBRIN DEGRADATION QUANT: CPT

## 2020-08-09 PROCEDURE — 2580000003 HC RX 258: Performed by: FAMILY MEDICINE

## 2020-08-09 PROCEDURE — 85025 COMPLETE CBC W/AUTO DIFF WBC: CPT

## 2020-08-09 PROCEDURE — 2060000000 HC ICU INTERMEDIATE R&B

## 2020-08-09 PROCEDURE — 93005 ELECTROCARDIOGRAM TRACING: CPT | Performed by: EMERGENCY MEDICINE

## 2020-08-09 PROCEDURE — 6370000000 HC RX 637 (ALT 250 FOR IP): Performed by: PHYSICIAN ASSISTANT

## 2020-08-09 PROCEDURE — 87633 RESP VIRUS 12-25 TARGETS: CPT

## 2020-08-09 PROCEDURE — 87581 M.PNEUMON DNA AMP PROBE: CPT

## 2020-08-09 PROCEDURE — 82728 ASSAY OF FERRITIN: CPT

## 2020-08-09 PROCEDURE — 85730 THROMBOPLASTIN TIME PARTIAL: CPT

## 2020-08-09 PROCEDURE — 4500000027

## 2020-08-09 PROCEDURE — 87486 CHLMYD PNEUM DNA AMP PROBE: CPT

## 2020-08-09 PROCEDURE — 71045 X-RAY EXAM CHEST 1 VIEW: CPT

## 2020-08-09 PROCEDURE — 83615 LACTATE (LD) (LDH) ENZYME: CPT

## 2020-08-09 PROCEDURE — U0002 COVID-19 LAB TEST NON-CDC: HCPCS

## 2020-08-09 PROCEDURE — 93010 ELECTROCARDIOGRAM REPORT: CPT | Performed by: INTERNAL MEDICINE

## 2020-08-09 PROCEDURE — 94640 AIRWAY INHALATION TREATMENT: CPT

## 2020-08-09 PROCEDURE — 99285 EMERGENCY DEPT VISIT HI MDM: CPT

## 2020-08-09 PROCEDURE — 85384 FIBRINOGEN ACTIVITY: CPT

## 2020-08-09 RX ORDER — POTASSIUM CHLORIDE 20 MEQ/1
20 TABLET, EXTENDED RELEASE ORAL 2 TIMES DAILY
Status: DISCONTINUED | OUTPATIENT
Start: 2020-08-10 | End: 2020-08-12 | Stop reason: HOSPADM

## 2020-08-09 RX ORDER — INDAPAMIDE 2.5 MG/1
1.25 TABLET, FILM COATED ORAL EVERY MORNING
Status: DISCONTINUED | OUTPATIENT
Start: 2020-08-10 | End: 2020-08-12 | Stop reason: HOSPADM

## 2020-08-09 RX ORDER — PROBENECID AND COLCHICINE 500; .5 MG/1; MG/1
1 TABLET ORAL DAILY
Status: DISCONTINUED | OUTPATIENT
Start: 2020-08-09 | End: 2020-08-09 | Stop reason: CLARIF

## 2020-08-09 RX ORDER — PRAMIPEXOLE DIHYDROCHLORIDE 0.5 MG/1
0.5 TABLET ORAL DAILY
COMMUNITY
End: 2021-07-06

## 2020-08-09 RX ORDER — BENZONATATE 100 MG/1
100 CAPSULE ORAL 3 TIMES DAILY PRN
Status: DISCONTINUED | OUTPATIENT
Start: 2020-08-09 | End: 2020-08-12 | Stop reason: HOSPADM

## 2020-08-09 RX ORDER — ACETAMINOPHEN 325 MG/1
650 TABLET ORAL EVERY 6 HOURS PRN
Status: DISCONTINUED | OUTPATIENT
Start: 2020-08-09 | End: 2020-08-12 | Stop reason: HOSPADM

## 2020-08-09 RX ORDER — IPRATROPIUM BROMIDE AND ALBUTEROL SULFATE 2.5; .5 MG/3ML; MG/3ML
1 SOLUTION RESPIRATORY (INHALATION)
Status: DISCONTINUED | OUTPATIENT
Start: 2020-08-09 | End: 2020-08-09

## 2020-08-09 RX ORDER — ACETAMINOPHEN 650 MG/1
650 SUPPOSITORY RECTAL EVERY 6 HOURS PRN
Status: DISCONTINUED | OUTPATIENT
Start: 2020-08-09 | End: 2020-08-12 | Stop reason: HOSPADM

## 2020-08-09 RX ORDER — COLCHICINE 0.6 MG/1
0.6 TABLET ORAL DAILY
Status: DISCONTINUED | OUTPATIENT
Start: 2020-08-09 | End: 2020-08-09

## 2020-08-09 RX ORDER — PROBENECID 500 MG/1
500 TABLET, FILM COATED ORAL DAILY
Status: DISCONTINUED | OUTPATIENT
Start: 2020-08-09 | End: 2020-08-09

## 2020-08-09 RX ORDER — PRAMIPEXOLE DIHYDROCHLORIDE 0.25 MG/1
0.5 TABLET ORAL DAILY
Status: DISCONTINUED | OUTPATIENT
Start: 2020-08-09 | End: 2020-08-12 | Stop reason: HOSPADM

## 2020-08-09 RX ORDER — SODIUM CHLORIDE 0.9 % (FLUSH) 0.9 %
10 SYRINGE (ML) INJECTION PRN
Status: DISCONTINUED | OUTPATIENT
Start: 2020-08-09 | End: 2020-08-12 | Stop reason: HOSPADM

## 2020-08-09 RX ORDER — IPRATROPIUM BROMIDE AND ALBUTEROL SULFATE 2.5; .5 MG/3ML; MG/3ML
1 SOLUTION RESPIRATORY (INHALATION) EVERY 4 HOURS PRN
Status: DISCONTINUED | OUTPATIENT
Start: 2020-08-09 | End: 2020-08-12 | Stop reason: HOSPADM

## 2020-08-09 RX ORDER — ALBUTEROL SULFATE 90 UG/1
2 AEROSOL, METERED RESPIRATORY (INHALATION) ONCE
Status: COMPLETED | OUTPATIENT
Start: 2020-08-09 | End: 2020-08-09

## 2020-08-09 RX ORDER — SODIUM CHLORIDE 0.9 % (FLUSH) 0.9 %
10 SYRINGE (ML) INJECTION EVERY 12 HOURS SCHEDULED
Status: DISCONTINUED | OUTPATIENT
Start: 2020-08-09 | End: 2020-08-12 | Stop reason: HOSPADM

## 2020-08-09 RX ORDER — ONDANSETRON 2 MG/ML
4 INJECTION INTRAMUSCULAR; INTRAVENOUS EVERY 6 HOURS PRN
Status: DISCONTINUED | OUTPATIENT
Start: 2020-08-09 | End: 2020-08-12 | Stop reason: HOSPADM

## 2020-08-09 RX ORDER — POLYETHYLENE GLYCOL 3350 17 G/17G
17 POWDER, FOR SOLUTION ORAL DAILY PRN
Status: DISCONTINUED | OUTPATIENT
Start: 2020-08-09 | End: 2020-08-12 | Stop reason: HOSPADM

## 2020-08-09 RX ORDER — AMLODIPINE BESYLATE 5 MG/1
5 TABLET ORAL DAILY
COMMUNITY

## 2020-08-09 RX ORDER — PROMETHAZINE HYDROCHLORIDE 25 MG/1
12.5 TABLET ORAL EVERY 6 HOURS PRN
Status: DISCONTINUED | OUTPATIENT
Start: 2020-08-09 | End: 2020-08-12 | Stop reason: HOSPADM

## 2020-08-09 RX ORDER — METOPROLOL SUCCINATE 25 MG/1
25 TABLET, EXTENDED RELEASE ORAL DAILY
Status: DISCONTINUED | OUTPATIENT
Start: 2020-08-09 | End: 2020-08-12 | Stop reason: HOSPADM

## 2020-08-09 RX ORDER — GUAIFENESIN 600 MG/1
600 TABLET, EXTENDED RELEASE ORAL 2 TIMES DAILY
Status: DISCONTINUED | OUTPATIENT
Start: 2020-08-09 | End: 2020-08-12 | Stop reason: HOSPADM

## 2020-08-09 RX ADMIN — ACETAMINOPHEN 650 MG: 325 TABLET ORAL at 13:47

## 2020-08-09 RX ADMIN — COLCHICINE 0.6 MG: 0.6 TABLET, FILM COATED ORAL at 16:56

## 2020-08-09 RX ADMIN — GUAIFENESIN 600 MG: 600 TABLET, EXTENDED RELEASE ORAL at 16:56

## 2020-08-09 RX ADMIN — ENOXAPARIN SODIUM 30 MG: 30 INJECTION SUBCUTANEOUS at 21:05

## 2020-08-09 RX ADMIN — AZITHROMYCIN MONOHYDRATE 500 MG: 500 INJECTION, POWDER, LYOPHILIZED, FOR SOLUTION INTRAVENOUS at 16:56

## 2020-08-09 RX ADMIN — PROBENECID 500 MG: 500 TABLET, FILM COATED ORAL at 17:10

## 2020-08-09 RX ADMIN — PRAMIPEXOLE DIHYDROCHLORIDE 0.5 MG: 0.25 TABLET ORAL at 16:56

## 2020-08-09 RX ADMIN — METOPROLOL SUCCINATE 25 MG: 25 TABLET, EXTENDED RELEASE ORAL at 16:56

## 2020-08-09 RX ADMIN — BENZONATATE 100 MG: 100 CAPSULE ORAL at 16:56

## 2020-08-09 RX ADMIN — ALBUTEROL SULFATE 2 PUFF: 90 AEROSOL, METERED RESPIRATORY (INHALATION) at 05:24

## 2020-08-09 RX ADMIN — SODIUM CHLORIDE, PRESERVATIVE FREE 10 ML: 5 INJECTION INTRAVENOUS at 16:57

## 2020-08-09 RX ADMIN — GUAIFENESIN 600 MG: 600 TABLET, EXTENDED RELEASE ORAL at 21:05

## 2020-08-09 RX ADMIN — ENOXAPARIN SODIUM 30 MG: 30 INJECTION SUBCUTANEOUS at 16:57

## 2020-08-09 RX ADMIN — SODIUM CHLORIDE, PRESERVATIVE FREE 10 ML: 5 INJECTION INTRAVENOUS at 21:05

## 2020-08-09 ASSESSMENT — PAIN SCALES - GENERAL
PAINLEVEL_OUTOF10: 0

## 2020-08-09 NOTE — PLAN OF CARE
Problem: Gas Exchange - Impaired:  Goal: Levels of oxygenation will improve  Description: Levels of oxygenation will improve  8/9/2020 1940 by Lizbeth Mesa RN  Outcome: Ongoing  8/9/2020 1436 by Maribel Reyes RN  Outcome: Ongoing     Problem: Pain:  Goal: Pain level will decrease  Description: Pain level will decrease  8/9/2020 1940 by Lizbeth Mesa RN  Outcome: Ongoing  8/9/2020 1436 by Maribel Reyes RN  Outcome: Ongoing  Goal: Control of acute pain  Description: Control of acute pain  8/9/2020 1940 by Lizbeth Mesa RN  Outcome: Ongoing  8/9/2020 1436 by Maribel Reyes RN  Outcome: Ongoing  Goal: Control of chronic pain  Description: Control of chronic pain  8/9/2020 1940 by Lizbeth Mesa RN  Outcome: Ongoing  8/9/2020 1436 by Maribel Reyes RN  Outcome: Ongoing

## 2020-08-09 NOTE — PLAN OF CARE
Problem: Gas Exchange - Impaired:  Goal: Levels of oxygenation will improve  Description: Levels of oxygenation will improve  Outcome: Ongoing     Problem: Pain:  Goal: Pain level will decrease  Description: Pain level will decrease  Outcome: Ongoing  Goal: Control of acute pain  Description: Control of acute pain  Outcome: Ongoing  Goal: Control of chronic pain  Description: Control of chronic pain  Outcome: Ongoing

## 2020-08-09 NOTE — H&P
mg by mouth daily, Disp: , Rfl:     pramipexole (MIRAPEX) 0.5 MG tablet, Take 0.5 mg by mouth daily, Disp: , Rfl:     metoprolol succinate (TOPROL XL) 25 MG extended release tablet, Take 25 mg by mouth daily, Disp: , Rfl:     fluticasone (FLONASE) 50 MCG/ACT nasal spray, 1 spray by Nasal route as needed, Disp: , Rfl:     colchicine-probenecid 0.5-500 MG per tablet, Take 1 tablet by mouth daily. , Disp: , Rfl:     indapamide (LOZOL) 1.25 MG tablet, Take 1.25 mg by mouth every morning., Disp: , Rfl:     potassium chloride SA (K-DUR;KLOR-CON M) 20 MEQ tablet, Take 20 mEq by mouth 2 times daily. , Disp: , Rfl:     History of present illness     Chief Complaint: Shortness of Breath (x 3 days) and Cough      Rafael Vegas is a 76 y.o.  male  who presents with SOB for about 2-3 days now. Denies any hx of COPD, Asthma or CHF. Has been having some cough and green sputum production, denies any fever or sick contacts, no LE swelling, no CP. Review of Systems : Ten point ROS reviewed and negative, unless as noted above per HPI       Objective:   No intake or output data in the 24 hours ending 08/09/20 0927   Vitals:   Vitals:    08/09/20 0745   BP: 129/78   Pulse: 89   Resp: 16   Temp:    SpO2: 91%     Physical Exam:   Gen:  awake, alert, no apparent distress  Head/Eyes:  Normocephalic atraumatic, EOMI   NECK:   symmetrical, trachea midline  LUNGS: Normal Effort   CARDIOVASCULAR:  Normal rate  ABDOMEN:  non distended  MUSCULOSKELETAL:  ROM WNL  NEUROLOGIC: Alert and Oriented,  Cranial nerves II-XII are grossly intact. SKIN:  no bruising or bleeding, normal skin color,  no redness      Past Medical History:      Past Medical History:   Diagnosis Date    DVT (deep venous thrombosis) ([facility])     History of Holter monitoring 07/28/2017    Rhythm is sinus    Hx of blood clots     bilateral legs in 2018    Hypertension     Shingles      PSHX:  has a past surgical history that includes Cholecystectomy;  Appendectomy; Mandible fracture surgery; Colonoscopy (10/22/2019); and Colonoscopy (N/A, 10/22/2019). Allergies: Allergies   Allergen Reactions    Allopurinol Hives       FAM HX: Reviewed and non-contributory  Soc HX:   Social History     Socioeconomic History    Marital status:      Spouse name: None    Number of children: None    Years of education: None    Highest education level: None   Occupational History    None   Social Needs    Financial resource strain: None    Food insecurity     Worry: None     Inability: None    Transportation needs     Medical: None     Non-medical: None   Tobacco Use    Smoking status: Never Smoker    Smokeless tobacco: Never Used   Substance and Sexual Activity    Alcohol use: No    Drug use: No    Sexual activity: None   Lifestyle    Physical activity     Days per week: None     Minutes per session: None    Stress: None   Relationships    Social connections     Talks on phone: None     Gets together: None     Attends Orthodox service: None     Active member of club or organization: None     Attends meetings of clubs or organizations: None     Relationship status: None    Intimate partner violence     Fear of current or ex partner: None     Emotionally abused: None     Physically abused: None     Forced sexual activity: None   Other Topics Concern    None   Social History Narrative    None       LABS  Recent Labs     08/09/20  0530   WBC 16.4*   HGB 16.6   HCT 49.9         Recent Labs     08/09/20  0530      K 3.7      CO2 26   BUN 9   CREATININE 1.0     Recent Labs     08/09/20  0530   AST 31   ALT 19   BILITOT 2.4*   ALKPHOS 107     No results for input(s): INR in the last 72 hours.   Recent Labs     08/09/20  0530   TROPONINT 0.042*        Xr Chest Portable    Result Date: 8/9/2020  EXAMINATION: ONE XRAY VIEW OF THE CHEST 8/9/2020 4:56 am COMPARISON: Chest radiograph dated March 6, 2019 HISTORY: ORDERING SYSTEM PROVIDED HISTORY: sob TECHNOLOGIST PROVIDED HISTORY: Reason for exam:->sob Reason for Exam: SOB Acuity: Unknown Type of Exam: Initial FINDINGS: Left-sided spinal stimulator device is seen overlying the chest.  The cardiomediastinal silhouette is stable. There is no focal consolidation, pleural effusion, or pneumothorax. There is no evidence of edema. No acute findings.        Electronically signed by Manas Limon MD on 8/9/2020 at 9:27 AM

## 2020-08-09 NOTE — ED NOTES
Tried to call charge RN and on [place] on 2E to give report. This RN was unable to contact either person. Left my number with  on 2E to have nurse call for report.        Parvez Medina RN  08/09/20 4760

## 2020-08-09 NOTE — ED PROVIDER NOTES
Did not see patient, interpreting EKG only: The Ekg interpreted by me shows  normal sinus rhythm with a rate of 94  Axis is   Normal  QTc is  normal  Intervals and Durations are unremarkable.       ST Segments: no acute change  No significant change from prior EKG dated 10-           Gabriella Cardenas MD  08/09/20 7934

## 2020-08-09 NOTE — ED TRIAGE NOTES
Pt presents to ED c/o SOB and cough for the past three days. Pt denies any known exposure to COVID. Pt is alert and oriented, denies pain.

## 2020-08-09 NOTE — ED NOTES
Report called to RAJIV Helton on 2E. All questions answered.       Houston Thurman RN  08/09/20 6445

## 2020-08-09 NOTE — ED NOTES
2540 paged hospitalist     [facility] [place]  08/09/20 4209  [facility] hospitalist returned call     [facility]  08/09/20 [phone]

## 2020-08-09 NOTE — ED PROVIDER NOTES
Emergency [address] EMERGENCY DEPARTMENT    Patient: Shawnee Camilo  MRN: [id number]  : 1945  Date of Evaluation: 2020  ED Provider: Kathryn Gómez PA-C    Chief Complaint       Chief Complaint   Patient presents with    Shortness of Breath     x 3 days    Cough       Pamella Chakraborty is a 76 y.o. male who presents to the emergency department for cough and shortness of breath. Patient reports a cough for the last 3 days. Occasionally productive of clear-yellow sputum. SOB developed since last night. Denies any chest pain. Denies any known fever. Denies n/v/d. Denies any known sick contacts or COVID exposures. He does have a history of DVT and is on Xarelto. No recent travel, hospitalizations, surgeries. Non-smoker. ROS     CONSTITUTIONAL:  Denies fever. EYES:  Denies visual changes. HEAD:  Denies headache. ENT:  Denies earache, nasal congestion, sore throat. NECK:  Denies neck pain. RESPIRATORY:  + cough, shortness of breath. CARDIOVASCULAR:  Denies chest pain. GI:  Denies nausea or vomiting. :  Denies urinary symptoms. MUSCULOSKELETAL:  Denies extremity pain or swelling. BACK:  Denies back pain. INTEGUMENT:  Denies skin changes. LYMPHATIC:  Denies lymphadenopathy. NEUROLOGIC:  Denies any numbness/tingling. PSYCHIATRIC:  Denies SI/HI.     Past History     Past Medical History:   Diagnosis Date    DVT (deep venous thrombosis) ([facility] [address]     History of Holter monitoring 2017    Rhythm is sinus    Hx of blood clots     bilateral legs in 2018    Hypertension     Shingles      Past Surgical History:   Procedure Laterality Date    APPENDECTOMY      CHOLECYSTECTOMY      COLONOSCOPY  10/22/2019    grade 1 internal hemorrhoids    COLONOSCOPY N/A 10/22/2019    COLONOSCOPY DIAGNOSTIC performed by Demetra Cano MD at [address] [place] [address] History     Socioeconomic History    Marital status:      Spouse name: None    Number of children: None    Years of education: None    Highest education level: None   Occupational History    None   Social Needs    Financial resource strain: None    Food insecurity     Worry: None     Inability: None    Transportation needs     Medical: None     Non-medical: None   Tobacco Use    Smoking status: Never Smoker    Smokeless tobacco: Never Used   Substance and Sexual Activity    Alcohol use: No    Drug use: No    Sexual activity: None   Lifestyle    Physical activity     Days per week: None     Minutes per session: None    Stress: None   Relationships    Social connections     Talks on phone: None     Gets together: None     Attends Gnosticist service: None     Active member of club or organization: None     Attends meetings of clubs or organizations: None     Relationship status: None    Intimate partner violence     Fear of current or ex partner: None     Emotionally abused: None     Physically abused: None     Forced sexual activity: None   Other Topics Concern    None   Social History Narrative    None       Medications/Allergies     Previous Medications    AMLODIPINE (NORVASC) 2.5 MG TABLET    Take 2.5 mg by mouth daily    CARBAMAZEPINE (TEGRETOL) 200 MG TABLET    Take 200 mg by mouth 3 times daily    COLCHICINE-PROBENECID 0.5-500 MG PER TABLET    Take 1 tablet by mouth daily. ENOXAPARIN SODIUM (LOVENOX SC)    Inject into the skin as needed    FLUTICASONE (FLONASE) 50 MCG/ACT NASAL SPRAY    1 spray by Nasal route as needed    INDAPAMIDE (LOZOL) 1.25 MG TABLET    Take 1.25 mg by mouth every morning. METOPROLOL SUCCINATE (TOPROL XL) 25 MG EXTENDED RELEASE TABLET    Take 25 mg by mouth daily    PANTOPRAZOLE (PROTONIX) 40 MG TABLET    Take 1 tablet by mouth every morning (before breakfast)    POTASSIUM CHLORIDE SA (K-DUR;KLOR-CON M) 20 MEQ TABLET    Take 20 mEq by mouth 2 times daily.     PRAMIPEXOLE (MIRAPEX) 0.5 MM    Basophils Absolute 0.4 K/CU MM    Nucleated RBC % 0.0 %    Total Nucleated RBC 0.0 K/CU MM    Total Immature Neutrophil 0.29 K/CU MM    Immature Neutrophil % 1.8 (H) 0 - 0.43 %   Comprehensive Metabolic Panel w/ Reflex to MG   Result Value Ref Range    Sodium 137 135 - 145 MMOL/L    Potassium 3.7 3.5 - 5.1 MMOL/L    Chloride 100 99 - 110 mMol/L    CO2 26 21 - 32 MMOL/L    BUN 9 6 - 23 MG/DL    CREATININE 1.0 0.9 - 1.3 MG/DL    Glucose 110 (H) 70 - 99 MG/DL    Calcium 8.8 8.3 - 10.6 MG/DL    Alb 3.6 3.4 - 5.0 GM/DL    Total Protein 6.6 6.4 - 8.2 GM/DL    Total Bilirubin 2.4 (H) 0.0 - 1.0 MG/DL    ALT 19 10 - 40 U/L    AST 31 15 - 37 IU/L    Alkaline Phosphatase 107 40 - 129 IU/L    GFR Non-African American >60 >60 mL/min/1.73m2    GFR African American >60 >60 mL/min/1.73m2    Anion Gap 11 4 - 16   Troponin   Result Value Ref Range    Troponin T 0.042 (H) <0.01 NG/ML   Brain Natriuretic Peptide   Result Value Ref Range    Pro-BNP 2,683 (H) <300 PG/ML   Lactic Acid, Plasma   Result Value Ref Range    Lactate 1.1 0.4 - 2.0 mMOL/L       Radiographs:  Xr Chest Portable    Result Date: 8/9/2020  EXAMINATION: ONE XRAY VIEW OF THE CHEST 8/9/2020 4:56 am COMPARISON: Chest radiograph dated March 6, 2019 HISTORY: ORDERING SYSTEM PROVIDED HISTORY: sob TECHNOLOGIST PROVIDED HISTORY: Reason for exam:->sob Reason for Exam: SOB Acuity: Unknown Type of Exam: Initial FINDINGS: Left-sided spinal stimulator device is seen overlying the chest.  The cardiomediastinal silhouette is stable. There is no focal consolidation, pleural effusion, or pneumothorax. There is no evidence of edema. No acute findings. Procedures/EKG:   Please see Dr. Beba Alex note for interpretation. ED Course and MDM   -  Patient seen and evaluated in the emergency department. -  Triage and nursing notes reviewed and incorporated. -  Old chart records reviewed and incorporated. -  Supervising physician was Dr. Amanda Hernandez.   Patient was seen independently. -  Work-up included:  See above  -  ED medications:  Albuterol MDI  -  Results discussed with patient. CXR is clear. White count is 16,400. Lytes are normal.  Total bili elevated at 2.4 but remaining LFTs normal.  Troponin elevated to 0.042. No acute EKG changes noted. BNP 2683. Lactic is normal.  I did discuss the case with Dr. Cade Ireland, on call for patient's PCP Dr. Kurt Trevino. He does suggest COVID testing, so will defer admission to the hospitalist team.  Dr. Margie Ha, hospitalist, accepted admission to their service. In light of current events, I did utilize appropriate PPE (including N95 and surgical face mask, safety glasses, and gloves, as recommended by the health facility/national standard best practice, during my bedside interactions with the patient. Patient was also masked throughout ED course. Final Impression      1. Shortness of breath    2.  Cough    3. Elevated troponin            DISPOSITION Decision To Admit 08/09/2020 06:53:22 AM      Sasha Miller PA-C  [address] [place] [address]  08/09/20 4682

## 2020-08-09 NOTE — ED NOTES
Patient placed in a gown and telemetry. Patient clean and dry. Patient up with 1 assist to the bathroom.        Parvez Medina RN  08/09/20 [address] RN  08/09/20 4646

## 2020-08-10 LAB
ALBUMIN SERPL-MCNC: 3.6 GM/DL (ref 3.4–5)
ALP BLD-CCNC: 100 IU/L (ref 40–128)
ALT SERPL-CCNC: 20 U/L (ref 10–40)
ANION GAP SERPL CALCULATED.3IONS-SCNC: 11 MMOL/L (ref 4–16)
APTT: 36.4 SECONDS (ref 25.1–37.1)
AST SERPL-CCNC: 23 IU/L (ref 15–37)
BASOPHILS ABSOLUTE: 0.4 K/CU MM
BASOPHILS RELATIVE PERCENT: 2.4 % (ref 0–1)
BILIRUB SERPL-MCNC: 2.6 MG/DL (ref 0–1)
BUN BLDV-MCNC: 13 MG/DL (ref 6–23)
CALCIUM SERPL-MCNC: 8.4 MG/DL (ref 8.3–10.6)
CHLORIDE BLD-SCNC: 99 MMOL/L (ref 99–110)
CO2: 27 MMOL/L (ref 21–32)
CREAT SERPL-MCNC: 1 MG/DL (ref 0.9–1.3)
D DIMER: 837 NG/ML(DDU)
DIFFERENTIAL TYPE: ABNORMAL
EKG ATRIAL RATE: 72 BPM
EKG DIAGNOSIS: NORMAL
EKG P AXIS: 26 DEGREES
EKG P-R INTERVAL: 192 MS
EKG Q-T INTERVAL: 444 MS
EKG QRS DURATION: 98 MS
EKG QTC CALCULATION (BAZETT): 486 MS
EKG R AXIS: 221 DEGREES
EKG T AXIS: 46 DEGREES
EKG VENTRICULAR RATE: 72 BPM
EOSINOPHILS ABSOLUTE: 0.7 K/CU MM
EOSINOPHILS RELATIVE PERCENT: 4.1 % (ref 0–3)
FIBRINOGEN LEVEL: 489 MG/DL (ref 196.9–442.1)
GFR AFRICAN AMERICAN: >60 ML/MIN/1.73M2
GFR NON-AFRICAN AMERICAN: >60 ML/MIN/1.73M2
GLUCOSE BLD-MCNC: 96 MG/DL (ref 70–99)
HCT VFR BLD CALC: 48.6 % (ref 42–52)
HEMOGLOBIN: 16 GM/DL (ref 13.5–18)
IMMATURE NEUTROPHIL %: 2.5 % (ref 0–0.43)
INR BLD: 1.33 INDEX
LEGIONELLA URINARY AG: NEGATIVE
LYMPHOCYTES ABSOLUTE: 1.1 K/CU MM
LYMPHOCYTES RELATIVE PERCENT: 6.5 % (ref 24–44)
MAGNESIUM: 2.2 MG/DL (ref 1.8–2.4)
MCH RBC QN AUTO: 28.1 PG (ref 27–31)
MCHC RBC AUTO-ENTMCNC: 32.9 % (ref 32–36)
MCV RBC AUTO: 85.3 FL (ref 78–100)
MONOCYTES ABSOLUTE: 1.2 K/CU MM
MONOCYTES RELATIVE PERCENT: 7.6 % (ref 0–4)
NUCLEATED RBC %: 0 %
PDW BLD-RTO: 14.9 % (ref 11.7–14.9)
PLATELET # BLD: 170 K/CU MM (ref 140–440)
PMV BLD AUTO: 10.2 FL (ref 7.5–11.1)
POTASSIUM SERPL-SCNC: 3.2 MMOL/L (ref 3.5–5.1)
PROTHROMBIN TIME: 16.2 SECONDS (ref 11.7–14.5)
RBC # BLD: 5.7 M/CU MM (ref 4.6–6.2)
SARS-COV-2: NOT DETECTED
SEGMENTED NEUTROPHILS ABSOLUTE COUNT: 12.5 K/CU MM
SEGMENTED NEUTROPHILS RELATIVE PERCENT: 76.9 % (ref 36–66)
SODIUM BLD-SCNC: 137 MMOL/L (ref 135–145)
SOURCE: NORMAL
STREP PNEUMONIAE ANTIGEN: NORMAL
TOTAL IMMATURE NEUTOROPHIL: 0.4 K/CU MM
TOTAL NUCLEATED RBC: 0 K/CU MM
TOTAL PROTEIN: 5.7 GM/DL (ref 6.4–8.2)
TROPONIN T: 0.02 NG/ML
TROPONIN T: 0.03 NG/ML
WBC # BLD: 16.2 K/CU MM (ref 4–10.5)

## 2020-08-10 PROCEDURE — 83735 ASSAY OF MAGNESIUM: CPT

## 2020-08-10 PROCEDURE — 93005 ELECTROCARDIOGRAM TRACING: CPT | Performed by: INTERNAL MEDICINE

## 2020-08-10 PROCEDURE — 85384 FIBRINOGEN ACTIVITY: CPT

## 2020-08-10 PROCEDURE — G0378 HOSPITAL OBSERVATION PER HR: HCPCS

## 2020-08-10 PROCEDURE — 85025 COMPLETE CBC W/AUTO DIFF WBC: CPT

## 2020-08-10 PROCEDURE — 87205 SMEAR GRAM STAIN: CPT

## 2020-08-10 PROCEDURE — 6370000000 HC RX 637 (ALT 250 FOR IP): Performed by: INTERNAL MEDICINE

## 2020-08-10 PROCEDURE — 85379 FIBRIN DEGRADATION QUANT: CPT

## 2020-08-10 PROCEDURE — 87449 NOS EACH ORGANISM AG IA: CPT

## 2020-08-10 PROCEDURE — 87070 CULTURE OTHR SPECIMN AEROBIC: CPT

## 2020-08-10 PROCEDURE — 87899 AGENT NOS ASSAY W/OPTIC: CPT

## 2020-08-10 PROCEDURE — 80053 COMPREHEN METABOLIC PANEL: CPT

## 2020-08-10 PROCEDURE — 6360000002 HC RX W HCPCS: Performed by: FAMILY MEDICINE

## 2020-08-10 PROCEDURE — 2580000003 HC RX 258: Performed by: FAMILY MEDICINE

## 2020-08-10 PROCEDURE — 85610 PROTHROMBIN TIME: CPT

## 2020-08-10 PROCEDURE — 6370000000 HC RX 637 (ALT 250 FOR IP): Performed by: FAMILY MEDICINE

## 2020-08-10 PROCEDURE — 93010 ELECTROCARDIOGRAM REPORT: CPT | Performed by: INTERNAL MEDICINE

## 2020-08-10 PROCEDURE — 85730 THROMBOPLASTIN TIME PARTIAL: CPT

## 2020-08-10 PROCEDURE — 84484 ASSAY OF TROPONIN QUANT: CPT

## 2020-08-10 PROCEDURE — 96372 THER/PROPH/DIAG INJ SC/IM: CPT

## 2020-08-10 PROCEDURE — 94761 N-INVAS EAR/PLS OXIMETRY MLT: CPT

## 2020-08-10 PROCEDURE — 2700000000 HC OXYGEN THERAPY PER DAY

## 2020-08-10 RX ORDER — AMOXICILLIN AND CLAVULANATE POTASSIUM 875; 125 MG/1; MG/1
1 TABLET, FILM COATED ORAL EVERY 12 HOURS SCHEDULED
Status: DISCONTINUED | OUTPATIENT
Start: 2020-08-10 | End: 2020-08-12 | Stop reason: HOSPADM

## 2020-08-10 RX ADMIN — AMOXICILLIN AND CLAVULANATE POTASSIUM 1 TABLET: 875; 125 TABLET, FILM COATED ORAL at 20:37

## 2020-08-10 RX ADMIN — SODIUM CHLORIDE, PRESERVATIVE FREE 10 ML: 5 INJECTION INTRAVENOUS at 20:37

## 2020-08-10 RX ADMIN — GUAIFENESIN 600 MG: 600 TABLET, EXTENDED RELEASE ORAL at 08:42

## 2020-08-10 RX ADMIN — INDAPAMIDE 1.25 MG: 2.5 TABLET, FILM COATED ORAL at 12:16

## 2020-08-10 RX ADMIN — POTASSIUM CHLORIDE 20 MEQ: 1500 TABLET, EXTENDED RELEASE ORAL at 08:42

## 2020-08-10 RX ADMIN — METOPROLOL SUCCINATE 25 MG: 25 TABLET, EXTENDED RELEASE ORAL at 08:42

## 2020-08-10 RX ADMIN — AMOXICILLIN AND CLAVULANATE POTASSIUM 1 TABLET: 875; 125 TABLET, FILM COATED ORAL at 12:16

## 2020-08-10 RX ADMIN — ENOXAPARIN SODIUM 30 MG: 30 INJECTION SUBCUTANEOUS at 20:37

## 2020-08-10 RX ADMIN — SODIUM CHLORIDE, PRESERVATIVE FREE 10 ML: 5 INJECTION INTRAVENOUS at 08:42

## 2020-08-10 RX ADMIN — GUAIFENESIN 600 MG: 600 TABLET, EXTENDED RELEASE ORAL at 20:37

## 2020-08-10 RX ADMIN — POTASSIUM CHLORIDE 20 MEQ: 1500 TABLET, EXTENDED RELEASE ORAL at 20:37

## 2020-08-10 RX ADMIN — ACETAMINOPHEN 650 MG: 325 TABLET ORAL at 20:37

## 2020-08-10 RX ADMIN — PRAMIPEXOLE DIHYDROCHLORIDE 0.5 MG: 0.25 TABLET ORAL at 08:42

## 2020-08-10 RX ADMIN — ENOXAPARIN SODIUM 30 MG: 30 INJECTION SUBCUTANEOUS at 08:42

## 2020-08-10 ASSESSMENT — PAIN SCALES - GENERAL
PAINLEVEL_OUTOF10: 0
PAINLEVEL_OUTOF10: 3

## 2020-08-10 NOTE — PROGRESS NOTES
Hospitalist Progress Note      Name:  Francisco Staples /Age/Sex: 1945  (76 y.o. male)   MRN & CSN:  [id number] & [id number] Admission Date/Time: 2020  4:18 AM   Location:  -A PCP: Yudith Zamora MD         Hospital Day: 2    ASSESSMENT & PLAN:   Francisco Staples is a 76 y.o.  male who presented with a complaint of shortness of breath, cough per admission HPI. However, when I saw him this morning, the patient stated that he did not have cough, shortness of breath or chest pain. He just did not feel good at all. He could not go into further details with that means. Disposition: Home  [facility] SARS[facility]CoV[facility]2 negative, no need for retest    Fever---- T-max 100.7 in the hospital.  WBC 16 K. Chest x-ray nonacute. Respiratory viral panel negative. Presented with a complaint of shortness of breath and cough per admission HPI. However, the patient denied having cough, shortness of breath or chest pain. -COVID-19  -Strep and Legionella urine antigen  - Empiric antibiotics with Augmentin    Elevated troponin--- troponin trending down. No chest pain. EKG without acute ischemic changes.  -Troponin and EKG x1    Hypokalemia---K+ 3.2  - P.o. KCl  -CMP daily    Extensive right lower extremity DVT--- 2017. Had been on Xarelto per admission HPI. Xarelto has been on hold since  per admission HPI for dental procedure. Chronic diastolic CHF--- stable. No exacerbation. CXR nonacute.     MEDICAL DECISION MAKING:  -Labs reviewed  -Imaging reviewed  -Level of risk moderate  Diet DIET GENERAL;   DVT Prophylaxis [x] Lovenox, []  Heparin, [] SCDs, [] Ambulation   GI Prophylaxis [] PPI,  [] H2 Blocker,  [] Carafate,  [] Diet/Tube Feeds   Code Status Full Code   Disposition  Home   MDM [] Low, [x] Moderate,[]  High     Chief complaint/Interval History/ROS     Chief Complaint: Not feeling well, shortness of breath, cough      INTERVAL HISTORY: No significant change in

## 2020-08-11 VITALS
BODY MASS INDEX: 28.64 KG/M2 | RESPIRATION RATE: 23 BRPM | HEIGHT: 68 IN | HEART RATE: 76 BPM | WEIGHT: 189 LBS | DIASTOLIC BLOOD PRESSURE: 73 MMHG | TEMPERATURE: 98.6 F | SYSTOLIC BLOOD PRESSURE: 130 MMHG | OXYGEN SATURATION: 98 %

## 2020-08-11 LAB
ALBUMIN SERPL-MCNC: NORMAL GM/DL (ref 3.4–4.8)
ALP BLD-CCNC: NORMAL IU/L (ref 25–100)
ALT SERPL-CCNC: NORMAL U/L (ref 10–40)
ANION GAP SERPL CALCULATED.3IONS-SCNC: 11 MMOL/L (ref 4–16)
ANION GAP SERPL CALCULATED.3IONS-SCNC: NORMAL MMOL/L (ref 4–16)
AST SERPL-CCNC: NORMAL IU/L (ref 8–33)
BANDED NEUTROPHILS ABSOLUTE COUNT: 0.5 K/CU MM
BANDED NEUTROPHILS RELATIVE PERCENT: 3 % (ref 5–11)
BASOPHILS ABSOLUTE: 0.2 K/CU MM
BASOPHILS ABSOLUTE: NORMAL K/CU MM
BASOPHILS RELATIVE PERCENT: 1 % (ref 0–1)
BASOPHILS RELATIVE PERCENT: NORMAL % (ref 0–1)
BILIRUB SERPL-MCNC: NORMAL MG/DL (ref 0.3–1.2)
BUN BLDV-MCNC: 17 MG/DL (ref 6–23)
BUN BLDV-MCNC: NORMAL MG/DL (ref 6–23)
CALCIUM SERPL-MCNC: 8.4 MG/DL (ref 8.3–10.6)
CALCIUM SERPL-MCNC: NORMAL MG/DL (ref 8.3–10.6)
CHLORIDE BLD-SCNC: 100 MMOL/L (ref 99–110)
CHLORIDE BLD-SCNC: NORMAL MMOL/L (ref 99–110)
CO2: 27 MMOL/L (ref 21–32)
CO2: NORMAL MMOL/L (ref 21–32)
CREAT SERPL-MCNC: 1 MG/DL (ref 0.9–1.3)
CREAT SERPL-MCNC: NORMAL MG/DL (ref 0.9–1.3)
DIFFERENTIAL TYPE: ABNORMAL
DIFFERENTIAL TYPE: NORMAL
EOSINOPHILS ABSOLUTE: 0.8 K/CU MM
EOSINOPHILS ABSOLUTE: NORMAL K/CU MM
EOSINOPHILS RELATIVE PERCENT: 5 % (ref 0–3)
EOSINOPHILS RELATIVE PERCENT: NORMAL % (ref 0–3)
GFR AFRICAN AMERICAN: >60 ML/MIN/1.73M2
GFR AFRICAN AMERICAN: NORMAL ML/MIN/1.73M2
GFR NON-AFRICAN AMERICAN: >60 ML/MIN/1.73M2
GFR NON-AFRICAN AMERICAN: NORMAL ML/MIN/1.73M2
GLUCOSE BLD-MCNC: 112 MG/DL (ref 70–99)
GLUCOSE BLD-MCNC: NORMAL MG/DL (ref 70–99)
HCT VFR BLD CALC: 51.1 % (ref 42–52)
HCT VFR BLD CALC: NORMAL % (ref 42–52)
HEMOGLOBIN: 16.5 GM/DL (ref 13.5–18)
HEMOGLOBIN: NORMAL GM/DL (ref 13.5–18)
IMMATURE NEUTROPHIL %: NORMAL % (ref 0–0.43)
LYMPHOCYTES ABSOLUTE: 0.3 K/CU MM
LYMPHOCYTES ABSOLUTE: NORMAL K/CU MM
LYMPHOCYTES RELATIVE PERCENT: 2 % (ref 24–44)
LYMPHOCYTES RELATIVE PERCENT: NORMAL % (ref 24–44)
MCH RBC QN AUTO: 28.2 PG (ref 27–31)
MCH RBC QN AUTO: NORMAL PG (ref 27–31)
MCHC RBC AUTO-ENTMCNC: 32.3 % (ref 32–36)
MCHC RBC AUTO-ENTMCNC: NORMAL % (ref 32–36)
MCV RBC AUTO: 87.2 FL (ref 78–100)
MCV RBC AUTO: NORMAL FL (ref 78–100)
MONOCYTES ABSOLUTE: 0.7 K/CU MM
MONOCYTES ABSOLUTE: NORMAL K/CU MM
MONOCYTES RELATIVE PERCENT: 4 % (ref 0–4)
MONOCYTES RELATIVE PERCENT: NORMAL % (ref 0–4)
NUCLEATED RBC %: NORMAL %
PDW BLD-RTO: 14.9 % (ref 11.7–14.9)
PDW BLD-RTO: NORMAL % (ref 11.7–14.9)
PLATELET # BLD: 192 K/CU MM (ref 140–440)
PLATELET # BLD: NORMAL K/CU MM (ref 140–440)
PLT MORPHOLOGY: ABNORMAL
PMV BLD AUTO: 11 FL (ref 7.5–11.1)
PMV BLD AUTO: NORMAL FL (ref 7.5–11.1)
POTASSIUM SERPL-SCNC: 3.4 MMOL/L (ref 3.5–5.1)
POTASSIUM SERPL-SCNC: NORMAL MMOL/L (ref 3.5–5.1)
RBC # BLD: 5.86 M/CU MM (ref 4.6–6.2)
RBC # BLD: NORMAL M/CU MM (ref 4.6–6.2)
SEGMENTED NEUTROPHILS ABSOLUTE COUNT: 14 K/CU MM
SEGMENTED NEUTROPHILS ABSOLUTE COUNT: NORMAL K/CU MM
SEGMENTED NEUTROPHILS RELATIVE PERCENT: 85 % (ref 36–66)
SEGMENTED NEUTROPHILS RELATIVE PERCENT: NORMAL % (ref 36–66)
SODIUM BLD-SCNC: 138 MMOL/L (ref 135–145)
SODIUM BLD-SCNC: NORMAL MMOL/L (ref 136–145)
TOTAL IMMATURE NEUTOROPHIL: NORMAL K/CU MM
TOTAL NUCLEATED RBC: NORMAL K/CU MM
TOTAL PROTEIN: NORMAL GM/DL (ref 6.4–8.3)
TOTAL RETICULOCYTE COUNT: NORMAL K/CU MM
WBC # BLD: 16.5 K/CU MM (ref 4–10.5)
WBC # BLD: NORMAL K/CU MM (ref 4–10.5)

## 2020-08-11 PROCEDURE — 2580000003 HC RX 258: Performed by: FAMILY MEDICINE

## 2020-08-11 PROCEDURE — 96372 THER/PROPH/DIAG INJ SC/IM: CPT

## 2020-08-11 PROCEDURE — 80048 BASIC METABOLIC PNL TOTAL CA: CPT

## 2020-08-11 PROCEDURE — 6360000002 HC RX W HCPCS: Performed by: FAMILY MEDICINE

## 2020-08-11 PROCEDURE — 6370000000 HC RX 637 (ALT 250 FOR IP): Performed by: FAMILY MEDICINE

## 2020-08-11 PROCEDURE — 85007 BL SMEAR W/DIFF WBC COUNT: CPT

## 2020-08-11 PROCEDURE — G0378 HOSPITAL OBSERVATION PER HR: HCPCS

## 2020-08-11 PROCEDURE — 6370000000 HC RX 637 (ALT 250 FOR IP): Performed by: INTERNAL MEDICINE

## 2020-08-11 PROCEDURE — 94761 N-INVAS EAR/PLS OXIMETRY MLT: CPT

## 2020-08-11 PROCEDURE — 85027 COMPLETE CBC AUTOMATED: CPT

## 2020-08-11 RX ADMIN — ENOXAPARIN SODIUM 30 MG: 30 INJECTION SUBCUTANEOUS at 21:08

## 2020-08-11 RX ADMIN — ENOXAPARIN SODIUM 30 MG: 30 INJECTION SUBCUTANEOUS at 09:22

## 2020-08-11 RX ADMIN — AMOXICILLIN AND CLAVULANATE POTASSIUM 1 TABLET: 875; 125 TABLET, FILM COATED ORAL at 09:21

## 2020-08-11 RX ADMIN — METOPROLOL SUCCINATE 25 MG: 25 TABLET, EXTENDED RELEASE ORAL at 09:19

## 2020-08-11 RX ADMIN — PRAMIPEXOLE DIHYDROCHLORIDE 0.5 MG: 0.25 TABLET ORAL at 12:27

## 2020-08-11 RX ADMIN — SODIUM CHLORIDE, PRESERVATIVE FREE 10 ML: 5 INJECTION INTRAVENOUS at 21:08

## 2020-08-11 RX ADMIN — POTASSIUM CHLORIDE 20 MEQ: 1500 TABLET, EXTENDED RELEASE ORAL at 21:08

## 2020-08-11 RX ADMIN — SODIUM CHLORIDE, PRESERVATIVE FREE 10 ML: 5 INJECTION INTRAVENOUS at 09:22

## 2020-08-11 RX ADMIN — POTASSIUM CHLORIDE 20 MEQ: 1500 TABLET, EXTENDED RELEASE ORAL at 09:17

## 2020-08-11 RX ADMIN — INDAPAMIDE 1.25 MG: 2.5 TABLET, FILM COATED ORAL at 09:20

## 2020-08-11 ASSESSMENT — PAIN SCALES - GENERAL
PAINLEVEL_OUTOF10: 0

## 2020-08-11 NOTE — PLAN OF CARE
Problem: Gas Exchange - Impaired:  Goal: Levels of oxygenation will improve  Description: Levels of oxygenation will improve  8/11/2020 0027 by Rusty Shipley RN  Outcome: Ongoing  8/10/2020 1117 by Malachi George RN  Outcome: Ongoing     Problem: Pain:  Goal: Pain level will decrease  Description: Pain level will decrease  8/11/2020 0027 by Rusty Shipley RN  Outcome: Ongoing  8/10/2020 1117 by Malachi George RN  Outcome: Ongoing  Goal: Control of acute pain  Description: Control of acute pain  8/11/2020 0027 by Rusty Shipley RN  Outcome: Ongoing  8/10/2020 1117 by Malachi George RN  Outcome: Ongoing  Goal: Control of chronic pain  Description: Control of chronic pain  8/11/2020 0027 by Rusty Shipley RN  Outcome: Ongoing  8/10/2020 1117 by Malachi George RN  Outcome: Ongoing     Problem: Falls - Risk of:  Goal: Will remain free from falls  Description: Will remain free from falls  8/11/2020 0027 by Rusty Shipley RN  Outcome: Ongoing  8/10/2020 1117 by Malachi George RN  Outcome: Ongoing  Goal: Absence of physical injury  Description: Absence of physical injury  8/11/2020 0027 by Rusty Shipley RN  Outcome: Ongoing  8/10/2020 1117 by Malachi George RN  Outcome: Ongoing

## 2020-08-11 NOTE — PROGRESS NOTES
INTERNAL MEDICINE PROGRESS NOTE        Varsha Diaz   [birth year]   Primary Care Physician:  Jessica Alston MD  Admit Date: 8/9/2020     Subjective:   Patient is 80-year-old male presented to ER with cough and SOB. He has history of diastolic CHF, chronic lower extremity DVTs, HTN, A. fib, postherpetic neuralgia, chronic lower extremity edema. He is in room with wife. He was admitted to the [facility] unit and tested negative so now he has been transferred to ICU and Dr. Gina Dietz will take over care. The patient is off oxygen at this point and doing better. Though, he still feels short of breath and has cough. Still has lingering leukocytosis. He is non-smoker. Denies GERD. He reports cough is productive with white mucus. The cough has lasted all summer. Thus far, CXR, respiratory PCR panel, COVID, urine Legionella, strep pneumo antigen, UA have all been relatively unremarkable. He also reports some nausea, decreased appetite since Saturday, and diarrhea since saturday. He was recently put on antibiotics. He denies chest pain, palpitations, dizziness, orthopnea, BRISENO, fever, abdominal pain, vomiting, constipation, blood in stool, dysuria, hematuria.       Objective:   /70   Pulse 80   Temp 98 °F (36.7 °C) (Oral)   Resp 9   Ht 5' 8\" (1.727 m)   Wt 189 lb (85.7 kg)   SpO2 92%   BMI 28.74 kg/m²    General appearance: alert, appears stated age and cooperative  Head: Normocephalic, without obvious abnormality, atraumatic  Neck: no adenopathy and supple, symmetrical, trachea midline  Lungs: clear to auscultation bilaterally  Heart: regular rate and rhythm and S1, S2 normal  Abdomen: soft, non-tender; bowel sounds normal; no masses,  no organomegaly  Extremities: no clubbing, cyanosis or edema  Neurologic: Grossly normal    Data Review  Lab Results   Component Value Date     08/11/2020    K 3.4 (L) 08/11/2020     08/11/2020    CO2 27 08/11/2020    CREATININE 1.0 08/11/2020    BUN 17 08/11/2020    CALCIUM 8.4 08/11/2020     Lab Results   Component Value Date    WBC 16.5 (H) 08/11/2020    HGB 16.5 08/11/2020    HCT 51.1 08/11/2020    MCV 87.2 08/11/2020     08/11/2020     INR/Prothrombin Time      Meds:    amoxicillin-clavulanate  1 tablet Oral 2 times per day    sodium chloride flush  10 mL Intravenous 2 times per day    enoxaparin  30 mg Subcutaneous BID    guaiFENesin  600 mg Oral BID    metoprolol succinate  25 mg Oral Daily    indapamide  1.25 mg Oral QAM    potassium chloride  20 mEq Oral BID    pramipexole  0.5 mg Oral Daily     PRN Meds: acetaminophen **OR** acetaminophen, sodium chloride flush, polyethylene glycol, promethazine **OR** ondansetron, benzonatate, ipratropium-albuterol    Assessment/Plan:   Patient Active Hospital Problem List:  Patient Active Problem List   Diagnosis    HTN (hypertension)    Gout    Partial obstruction of small intestine (HCC)    Abdominal pain, other specified site    Acute deep vein thrombosis (DVT) of right lower extremity (HCC)    Facial cellulitis    Herpes zoster oticus    Chest pain    Recurrent deep venous thrombosis (HCC)    Shortness of breath     Assessment:  ? Bronchitis: He has persistent cough. CXR negative. COVID negative. Respiratory PCR panel negative, blood culture negative x2. Legionella and strep pneumo antigen negative. Fever: Improved. WBC remains elevated around 16K. Check pro-Mitchell.  Was on IV Rocephin. Continue oral Augmentin. Leukocytosis: Remains elevated around 16k. Check pro-Mitchell.  Elevated troponin: Repeat troponins have trended down. EKG is non-concerning. Hypokalemia: Improving. Continue oral supplementation. Chronic DVTs: On Lovenox. Chronic diastolic CHF: proBNP slightly elevated at 2683. CXR was okay. On BB. Plan:  Dr. Dmitriy Hillman will take over care. Trend WBC and check pro-Mitchell.  Continue oral ABX. Monitor labs and patient condition.      Electronically signed by Leti Monteiro Saeed Helms PA-C on 8/11/2020 at 3:05 PM   I have independently evaluated and examined this patient today. I have reviewed radiologic and biochemical tests on this patient. Management Plan is developed mutually with RICK Madison. I have reviewed above note and agree with assessment and plan. Discussed with patient and family, if stable possible discharge tomorrow . Most likely elevated wbc count secondary to his dental infections.  Monitor condition overnight

## 2020-08-11 NOTE — CARE COORDINATION
CM in to see pt to discuss discharge plans. Pt admitted for cough and shortness of breath. Pt and wife reside together, both drive. Pt has been independent prior to admission. Pt has pcp and insurance to provide for Rx's. Pt plans for discharge home and expresses no needs.

## 2020-08-12 LAB
ALBUMIN SERPL-MCNC: 3.5 GM/DL (ref 3.4–5)
ALP BLD-CCNC: 96 IU/L (ref 40–129)
ALT SERPL-CCNC: 15 U/L (ref 10–40)
ANION GAP SERPL CALCULATED.3IONS-SCNC: 11 MMOL/L (ref 4–16)
AST SERPL-CCNC: 20 IU/L (ref 15–37)
BASOPHILS ABSOLUTE: 0.3 K/CU MM
BASOPHILS RELATIVE PERCENT: 2.2 % (ref 0–1)
BILIRUB SERPL-MCNC: 1.5 MG/DL (ref 0–1)
BUN BLDV-MCNC: 15 MG/DL (ref 6–23)
CALCIUM SERPL-MCNC: 8.4 MG/DL (ref 8.3–10.6)
CHLORIDE BLD-SCNC: 102 MMOL/L (ref 99–110)
CO2: 27 MMOL/L (ref 21–32)
CREAT SERPL-MCNC: 1.1 MG/DL (ref 0.9–1.3)
CULTURE: NORMAL
DIFFERENTIAL TYPE: ABNORMAL
EOSINOPHILS ABSOLUTE: 1.1 K/CU MM
EOSINOPHILS RELATIVE PERCENT: 7.1 % (ref 0–3)
ERYTHROCYTE SEDIMENTATION RATE: 14 MM/HR (ref 0–20)
GFR AFRICAN AMERICAN: >60 ML/MIN/1.73M2
GFR NON-AFRICAN AMERICAN: >60 ML/MIN/1.73M2
GLUCOSE BLD-MCNC: 99 MG/DL (ref 70–99)
GRAM SMEAR: NORMAL
HCT VFR BLD CALC: 49.2 % (ref 42–52)
HEMOGLOBIN: 15.8 GM/DL (ref 13.5–18)
HIGH SENSITIVE C-REACTIVE PROTEIN: 77.5 MG/L
IMMATURE NEUTROPHIL %: 2.5 % (ref 0–0.43)
LYMPHOCYTES ABSOLUTE: 1.1 K/CU MM
LYMPHOCYTES RELATIVE PERCENT: 7.2 % (ref 24–44)
Lab: NORMAL
MCH RBC QN AUTO: 27.7 PG (ref 27–31)
MCHC RBC AUTO-ENTMCNC: 32.1 % (ref 32–36)
MCV RBC AUTO: 86.2 FL (ref 78–100)
MONOCYTES ABSOLUTE: 1 K/CU MM
MONOCYTES RELATIVE PERCENT: 6.8 % (ref 0–4)
NUCLEATED RBC %: 0 %
PDW BLD-RTO: 14.8 % (ref 11.7–14.9)
PLATELET # BLD: 200 K/CU MM (ref 140–440)
PMV BLD AUTO: 10.8 FL (ref 7.5–11.1)
POTASSIUM SERPL-SCNC: 3.7 MMOL/L (ref 3.5–5.1)
PRO-BNP: 871.1 PG/ML
RBC # BLD: 5.71 M/CU MM (ref 4.6–6.2)
SEGMENTED NEUTROPHILS ABSOLUTE COUNT: 10.9 K/CU MM
SEGMENTED NEUTROPHILS RELATIVE PERCENT: 74.2 % (ref 36–66)
SODIUM BLD-SCNC: 140 MMOL/L (ref 135–145)
SPECIMEN: NORMAL
TOTAL IMMATURE NEUTOROPHIL: 0.37 K/CU MM
TOTAL NUCLEATED RBC: 0 K/CU MM
TOTAL PROTEIN: 5.7 GM/DL (ref 6.4–8.2)
WBC # BLD: 14.8 K/CU MM (ref 4–10.5)

## 2020-08-12 PROCEDURE — 6370000000 HC RX 637 (ALT 250 FOR IP): Performed by: FAMILY MEDICINE

## 2020-08-12 PROCEDURE — 36415 COLL VENOUS BLD VENIPUNCTURE: CPT

## 2020-08-12 PROCEDURE — G0378 HOSPITAL OBSERVATION PER HR: HCPCS

## 2020-08-12 PROCEDURE — 85025 COMPLETE CBC W/AUTO DIFF WBC: CPT

## 2020-08-12 PROCEDURE — 83880 ASSAY OF NATRIURETIC PEPTIDE: CPT

## 2020-08-12 PROCEDURE — 85652 RBC SED RATE AUTOMATED: CPT

## 2020-08-12 PROCEDURE — 86141 C-REACTIVE PROTEIN HS: CPT

## 2020-08-12 PROCEDURE — 6370000000 HC RX 637 (ALT 250 FOR IP): Performed by: INTERNAL MEDICINE

## 2020-08-12 PROCEDURE — 80053 COMPREHEN METABOLIC PANEL: CPT

## 2020-08-12 RX ORDER — AMOXICILLIN AND CLAVULANATE POTASSIUM 875; 125 MG/1; MG/1
1 TABLET, FILM COATED ORAL EVERY 12 HOURS SCHEDULED
Qty: 14 TABLET | Refills: 0 | Status: SHIPPED | OUTPATIENT
Start: 2020-08-12 | End: 2020-08-19

## 2020-08-12 RX ADMIN — METOPROLOL SUCCINATE 25 MG: 25 TABLET, EXTENDED RELEASE ORAL at 08:39

## 2020-08-12 RX ADMIN — AMOXICILLIN AND CLAVULANATE POTASSIUM 1 TABLET: 875; 125 TABLET, FILM COATED ORAL at 08:39

## 2020-08-12 RX ADMIN — POTASSIUM CHLORIDE 20 MEQ: 1500 TABLET, EXTENDED RELEASE ORAL at 08:39

## 2020-08-12 RX ADMIN — INDAPAMIDE 1.25 MG: 2.5 TABLET, FILM COATED ORAL at 08:39

## 2020-08-12 NOTE — DISCHARGE SUMMARY
Claude Hurst MD, Internal Medicine    [address] [facility]   [phone]   Patient ID  Pham Odom   1945  [id number]          Admit date: 8/9/2020   Discharge date: 8/12/2020      Admitting Physician: Huy Cruz MD   Discharge Physician: Reno Gonzalez PA-C    Discharge Diagnoses:   Bronchitis: He has persistent cough. CXR negative. COVID negative. Respiratory PCR panel negative, blood culture negative x2. Legionella and strep pneumo antigen negative. Dyspnea improving, now 98% on RA. Fever improved. Continue oral Augmentin. Dental infection/Leukocytosis: likely 2/2 dental infection. Discharge WBC is still around 14.8K, was 16.5K. Improving with Augmentin. Will get several teeth removed in 2 weeks. Elevated troponin: Repeat troponins have trended down. EKG is non-concerning. Hypokalemia: Improved  Continue oral supplementation. Chronic DVTs: On AC. Chronic diastolic CHF: proBNP improved around 800. CXR was nml. On BB.      Patient Active Problem List   Diagnosis    HTN (hypertension)    Gout    Partial obstruction of small intestine (HCC)    Abdominal pain, other specified site    Acute deep vein thrombosis (DVT) of right lower extremity (HCC)    Facial cellulitis    Herpes zoster oticus    Chest pain    Recurrent deep venous thrombosis (HCC)    Shortness of breath       Discharged Condition: good    Hospital Course: Patient is 71-year-old male who presented to ER with cough and SOB. He has history of diastolic CHF, chronic lower extremity DVTs, HTN, A. fib, postherpetic neuralgia, chronic lower extremity edema. He was admitted to the COVID unit and tested negative so he was transferred to ICU and Dr. Claude Hurst took over care. The patient was on O2 NC but now he is off oxygen now and doing well on room air. He did receive IV antibiotics. CXR and comprehensive respiratory work up came back negative. He still has persistent cough likely 2/2 bronchitis. Also he has lingering leukocytosis that is likely 2/2 dental infections. He is non-smoker. He is scheduled to have several teeth pulled in 2 weeks at the dentist. Overall the patient is doing much better. SOB and fever have improved, and leukocytosis has improved some as well. He is to follow up with PCP and dentist as outpatient. Relevant Investigations  CXR 8/9/20  No acute findings. Disposition: home    Patient Instructions:    [facility]   Home Medication Instructions EMILY:[id number]    Printed on:08/12/20 6089   Medication Information                      amLODIPine (NORVASC) 5 MG tablet  Take 5 mg by mouth daily             amoxicillin-clavulanate (AUGMENTIN) 875-125 MG per tablet  Take 1 tablet by mouth every 12 hours for 7 days             colchicine-probenecid 0.5-500 MG per tablet  Take 1 tablet by mouth daily. fluticasone (FLONASE) 50 MCG/ACT nasal spray  1 spray by Nasal route as needed             indapamide (LOZOL) 1.25 MG tablet  Take 1.25 mg by mouth every morning. metoprolol succinate (TOPROL XL) 25 MG extended release tablet  Take 25 mg by mouth daily             potassium chloride SA (K-DUR;KLOR-CON M) 20 MEQ tablet  Take 20 mEq by mouth 2 times daily. pramipexole (MIRAPEX) 0.5 MG tablet  Take 0.5 mg by mouth daily             rivaroxaban (XARELTO) 20 MG TABS tablet  Take 20 mg by mouth daily                    Activity: activity as tolerated  Diet: cardiac diet    Follow-up with Dr. Claude Hurst in 5 days    Signed: Electronically signed by Reno Gonzalez PA-C on 8/12/2020 at 9:52 AM    Time spent on discharge 35 minutes  I have independently evaluated and examined this patient today. I have reviewed radiologic and biochemical tests on this patient. Management Plan is developed mutually with RICK Clark. I have reviewed above note and agree with assessment and plan.

## 2020-08-14 LAB
CULTURE: NORMAL
CULTURE: NORMAL
Lab: NORMAL
Lab: NORMAL
SPECIMEN: NORMAL
SPECIMEN: NORMAL

## 2020-10-15 ENCOUNTER — HOSPITAL ENCOUNTER (EMERGENCY)
Age: 75
Discharge: HOME | End: 2020-10-15
Payer: MEDICARE

## 2020-10-15 ENCOUNTER — APPOINTMENT (OUTPATIENT)
Dept: CT IMAGING | Age: 75
End: 2020-10-15
Payer: MEDICARE

## 2020-10-15 VITALS
BODY MASS INDEX: 30.01 KG/M2 | OXYGEN SATURATION: 97 % | SYSTOLIC BLOOD PRESSURE: 136 MMHG | RESPIRATION RATE: 15 BRPM | HEART RATE: 76 BPM | DIASTOLIC BLOOD PRESSURE: 75 MMHG | TEMPERATURE: 98.6 F | HEIGHT: 68 IN | WEIGHT: 198 LBS

## 2020-10-15 LAB
ALBUMIN SERPL-MCNC: 3.8 GM/DL (ref 3.4–5)
ALP BLD-CCNC: 98 IU/L (ref 40–129)
ALT SERPL-CCNC: 15 U/L (ref 10–40)
ANION GAP SERPL CALCULATED.3IONS-SCNC: 11 MMOL/L (ref 4–16)
AST SERPL-CCNC: 21 IU/L (ref 15–37)
BACTERIA: NEGATIVE /HPF
BASOPHILS ABSOLUTE: 0.4 K/CU MM
BASOPHILS RELATIVE PERCENT: 2.9 % (ref 0–1)
BILIRUB SERPL-MCNC: 1.9 MG/DL (ref 0–1)
BILIRUBIN URINE: NEGATIVE MG/DL
BLOOD, URINE: NEGATIVE
BUN BLDV-MCNC: 8 MG/DL (ref 6–23)
CALCIUM SERPL-MCNC: 9 MG/DL (ref 8.3–10.6)
CHLORIDE BLD-SCNC: 98 MMOL/L (ref 99–110)
CLARITY: CLEAR
CO2: 24 MMOL/L (ref 21–32)
COLOR: YELLOW
CREAT SERPL-MCNC: 0.9 MG/DL (ref 0.9–1.3)
DIFFERENTIAL TYPE: ABNORMAL
EOSINOPHILS ABSOLUTE: 0.5 K/CU MM
EOSINOPHILS RELATIVE PERCENT: 3.3 % (ref 0–3)
GFR AFRICAN AMERICAN: >60 ML/MIN/1.73M2
GFR NON-AFRICAN AMERICAN: >60 ML/MIN/1.73M2
GLUCOSE BLD-MCNC: 116 MG/DL (ref 70–99)
GLUCOSE, URINE: NEGATIVE MG/DL
HCT VFR BLD CALC: 50.3 % (ref 42–52)
HEMOGLOBIN: 15.8 GM/DL (ref 13.5–18)
IMMATURE NEUTROPHIL %: 1.8 % (ref 0–0.43)
KETONES, URINE: NEGATIVE MG/DL
LACTATE: 1.2 MMOL/L (ref 0.4–2)
LEUKOCYTE ESTERASE, URINE: NEGATIVE
LIPASE: 22 IU/L (ref 13–60)
LYMPHOCYTES ABSOLUTE: 0.8 K/CU MM
LYMPHOCYTES RELATIVE PERCENT: 5.5 % (ref 24–44)
MCH RBC QN AUTO: 27.9 PG (ref 27–31)
MCHC RBC AUTO-ENTMCNC: 31.4 % (ref 32–36)
MCV RBC AUTO: 88.7 FL (ref 78–100)
MONOCYTES ABSOLUTE: 0.7 K/CU MM
MONOCYTES RELATIVE PERCENT: 4.9 % (ref 0–4)
NITRITE URINE, QUANTITATIVE: NEGATIVE
NUCLEATED RBC %: 0 %
PDW BLD-RTO: 16.5 % (ref 11.7–14.9)
PH, URINE: 7 (ref 5–8)
PLATELET # BLD: 359 K/CU MM (ref 140–440)
PMV BLD AUTO: 9.7 FL (ref 7.5–11.1)
POTASSIUM SERPL-SCNC: 3.7 MMOL/L (ref 3.5–5.1)
PROTEIN UA: NEGATIVE MG/DL
RBC # BLD: 5.67 M/CU MM (ref 4.6–6.2)
RBC URINE: <1 /HPF (ref 0–3)
SEGMENTED NEUTROPHILS ABSOLUTE COUNT: 11.8 K/CU MM
SEGMENTED NEUTROPHILS RELATIVE PERCENT: 81.6 % (ref 36–66)
SODIUM BLD-SCNC: 133 MMOL/L (ref 135–145)
SPECIFIC GRAVITY UA: 1.01 (ref 1–1.03)
SQUAMOUS EPITHELIAL: <1 /HPF
TOTAL IMMATURE NEUTOROPHIL: 0.26 K/CU MM
TOTAL NUCLEATED RBC: 0 K/CU MM
TOTAL PROTEIN: 6 GM/DL (ref 6.4–8.2)
TRICHOMONAS: NORMAL /HPF
UROBILINOGEN, URINE: NORMAL MG/DL (ref 0.2–1)
WBC # BLD: 14.4 K/CU MM (ref 4–10.5)
WBC UA: <1 /HPF (ref 0–2)

## 2020-10-15 PROCEDURE — 80053 COMPREHEN METABOLIC PANEL: CPT

## 2020-10-15 PROCEDURE — 36415 COLL VENOUS BLD VENIPUNCTURE: CPT

## 2020-10-15 PROCEDURE — 83690 ASSAY OF LIPASE: CPT

## 2020-10-15 PROCEDURE — 81001 URINALYSIS AUTO W/SCOPE: CPT

## 2020-10-15 PROCEDURE — 99284 EMERGENCY DEPT VISIT MOD MDM: CPT

## 2020-10-15 PROCEDURE — 74177 CT ABD & PELVIS W/CONTRAST: CPT

## 2020-10-15 PROCEDURE — 6360000004 HC RX CONTRAST MEDICATION: Performed by: PHYSICIAN ASSISTANT

## 2020-10-15 PROCEDURE — 83605 ASSAY OF LACTIC ACID: CPT

## 2020-10-15 PROCEDURE — 85025 COMPLETE CBC W/AUTO DIFF WBC: CPT

## 2020-10-15 RX ORDER — AMOXICILLIN 250 MG
1 CAPSULE ORAL 2 TIMES DAILY
Qty: 20 TABLET | Refills: 0 | Status: SHIPPED | OUTPATIENT
Start: 2020-10-15 | End: 2022-01-06

## 2020-10-15 RX ADMIN — IOPAMIDOL 75 ML: 755 INJECTION, SOLUTION INTRAVENOUS at 14:15

## 2020-10-15 ASSESSMENT — PAIN DESCRIPTION - LOCATION: LOCATION: ABDOMEN

## 2020-10-15 ASSESSMENT — PAIN SCALES - GENERAL: PAINLEVEL_OUTOF10: 7

## 2020-10-15 ASSESSMENT — PAIN DESCRIPTION - PAIN TYPE: TYPE: ACUTE PAIN

## 2020-10-15 NOTE — ED NOTES
Soap suds enema administered, pt able to sit on bedside commode and have large bowel movement. Pt reports relief and decrease in abd cramping.  Pt states he feels like he was able to pass all stool      Bao Vasquez RN  10/15/20 3177

## 2020-10-15 NOTE — ED NOTES
Pt states that he has not had a bowel movement in 3 days. Pt reports taking laxatives and has not helped.       Angy Klein RN  10/15/20 5222

## 2020-10-15 NOTE — ED PROVIDER NOTES
systems are negative  See HPI and nursing notes for additional information     PAST MEDICAL & SURGICAL HISTORY    Past Medical History:   Diagnosis Date    DVT (deep venous thrombosis) ([facility] [address]     History of Holter monitoring 07/28/2017    Rhythm is sinus    Hx of blood clots     bilateral legs in 2018    Hypertension     Shingles      Past Surgical History:   Procedure Laterality Date    APPENDECTOMY      CHOLECYSTECTOMY      COLONOSCOPY  10/22/2019    grade 1 internal hemorrhoids    COLONOSCOPY N/A 10/22/2019    COLONOSCOPY DIAGNOSTIC performed by Gisella Cooper MD at [place] [address]    Current Outpatient Rx   Medication Sig Dispense Refill    senna-docusate (Sho Goldmann) 8.6-50 MG per tablet Take 1 tablet by mouth 2 times daily 20 tablet 0    amLODIPine (NORVASC) 5 MG tablet Take 5 mg by mouth daily      pramipexole (MIRAPEX) 0.5 MG tablet Take 0.5 mg by mouth daily      rivaroxaban (XARELTO) 20 MG TABS tablet Take 20 mg by mouth daily      metoprolol succinate (TOPROL XL) 25 MG extended release tablet Take 25 mg by mouth daily      fluticasone (FLONASE) 50 MCG/ACT nasal spray 1 spray by Nasal route as needed      colchicine-probenecid 0.5-500 MG per tablet Take 1 tablet by mouth daily.  indapamide (LOZOL) 1.25 MG tablet Take 1.25 mg by mouth every morning.  potassium chloride SA (K-DUR;KLOR-CON M) 20 MEQ tablet Take 20 mEq by mouth 2 times daily.          ALLERGIES    Allergies   Allergen Reactions    Allopurinol Hives       SOCIAL AND FAMILY HISTORY    Social History     Socioeconomic History    Marital status:      Spouse name: None    Number of children: None    Years of education: None    Highest education level: None   Occupational History    None   Social Needs    Financial resource strain: None    Food insecurity     Worry: None     Inability: None    Transportation needs     Medical: None     Non-medical: None Tobacco Use    Smoking status: Never Smoker    Smokeless tobacco: Never Used   Substance and Sexual Activity    Alcohol use: No    Drug use: No    Sexual activity: None   Lifestyle    Physical activity     Days per week: None     Minutes per session: None    Stress: None   Relationships    Social connections     Talks on phone: None     Gets together: None     Attends Hinduism service: None     Active member of club or organization: None     Attends meetings of clubs or organizations: None     Relationship status: None    Intimate partner violence     Fear of current or ex partner: None     Emotionally abused: None     Physically abused: None     Forced sexual activity: None   Other Topics Concern    None   Social History Narrative    None     History reviewed. No pertinent family history. PHYSICAL EXAM    VITAL SIGNS: /75   Pulse 76   Temp 98.6 °F (37 °C) (Oral)   Resp 15   Ht 5' 8\" (1.727 m)   Wt 198 lb (89.8 kg)   SpO2 97%   BMI 30.11 kg/m²   General:  Well developed, well nourished, In no acute distress  Eyes:  Sclera nonicteric, Conjunctiva moist, No discharge  Head:  Normocephalic, Atramautic  Neck/Lymphatics: Supple, no JVD, no swollen nodes  Respiratory:  Clear to ausculation bilaterally, No retractions, Non labored breathing  Cardiovascular:  RRR, No murmurs/gallops/thrills  GI:   No gross discoloration. Bowel sounds present in all quadrants, No audible bruits. Soft, mildly distended abdomen, not Taizhong, no ascites. Generalized lower abdominal tenderness, increased in the right lower quadrant without rebound tenderness or guarding. No palpable pulsatile masses or obvious hernias. RECTAL:  No external masses, erythema, or bleeding. No bleeding external hemorrhoids or obvious anal fissures. There is no tenderness to palpation. Rectal tone is moderate. There is a moderate sized small soft stool ball within the rectal vault, light brown stool, no bright red blood.  Heme Negative for occult blood. Control is positive. Back:  No CVA tenderness to percussion.   Musculoskeletal:  No edema, No deformity  Peripheral Vascular: Distal pulses 2+ equal bilaterally  Integument: No rash, Normal turgor  Neurologic:  Alert & oriented, Normal speech  Psychiatric: Cooperative, pleasant affect       I have reviewed and interpreted all of the currently available lab results from this visit (if applicable):  Results for orders placed or performed during the hospital encounter of 10/15/20   CBC auto diff   Result Value Ref Range    WBC 14.4 (H) 4.0 - 10.5 K/CU MM    RBC 5.67 4.6 - 6.2 M/CU MM    Hemoglobin 15.8 13.5 - 18.0 GM/DL    Hematocrit 50.3 42 - 52 %    MCV 88.7 78 - 100 FL    MCH 27.9 27 - 31 PG    MCHC 31.4 (L) 32.0 - 36.0 %    RDW 16.5 (H) 11.7 - 14.9 %    Platelets 169 247 - 694 K/CU MM    MPV 9.7 7.5 - 11.1 FL    Differential Type AUTOMATED DIFFERENTIAL     Segs Relative 81.6 (H) 36 - 66 %    Lymphocytes % 5.5 (L) 24 - 44 %    Monocytes % 4.9 (H) 0 - 4 %    Eosinophils % 3.3 (H) 0 - 3 %    Basophils % 2.9 (H) 0 - 1 %    Segs Absolute 11.8 K/CU MM    Lymphocytes Absolute 0.8 K/CU MM    Monocytes Absolute 0.7 K/CU MM    Eosinophils Absolute 0.5 K/CU MM    Basophils Absolute 0.4 K/CU MM    Nucleated RBC % 0.0 %    Total Nucleated RBC 0.0 K/CU MM    Total Immature Neutrophil 0.26 K/CU MM    Immature Neutrophil % 1.8 (H) 0 - 0.43 %   CMP   Result Value Ref Range    Sodium 133 (L) 135 - 145 MMOL/L    Potassium 3.7 3.5 - 5.1 MMOL/L    Chloride 98 (L) 99 - 110 mMol/L    CO2 24 21 - 32 MMOL/L    BUN 8 6 - 23 MG/DL    CREATININE 0.9 0.9 - 1.3 MG/DL    Glucose 116 (H) 70 - 99 MG/DL    Calcium 9.0 8.3 - 10.6 MG/DL    Alb 3.8 3.4 - 5.0 GM/DL    Total Protein 6.0 (L) 6.4 - 8.2 GM/DL    Total Bilirubin 1.9 (H) 0.0 - 1.0 MG/DL    ALT 15 10 - 40 U/L    AST 21 15 - 37 IU/L    Alkaline Phosphatase 98 40 - 129 IU/L    GFR Non-African American >60 >60 mL/min/1.73m2    GFR African American >60 >60 mL/min/1.73m2    Anion Gap 11 4 - 16   Lipase   Result Value Ref Range    Lipase 22 13 - 60 IU/L   Lactic Acid, Plasma   Result Value Ref Range    Lactate 1.2 0.4 - 2.0 mMOL/L   Urinalysis   Result Value Ref Range    Color, UA YELLOW YELLOW    Clarity, UA CLEAR CLEAR    Glucose, Urine NEGATIVE NEGATIVE MG/DL    Bilirubin Urine NEGATIVE NEGATIVE MG/DL    Ketones, Urine NEGATIVE NEGATIVE MG/DL    Specific Gravity, UA 1.013 1.001 - 1.035    Blood, Urine NEGATIVE NEGATIVE    pH, Urine 7.0 5.0 - 8.0    Protein, UA NEGATIVE NEGATIVE MG/DL    Urobilinogen, Urine NORMAL 0.2 - 1.0 MG/DL    Nitrite Urine, Quantitative NEGATIVE NEGATIVE    Leukocyte Esterase, Urine NEGATIVE NEGATIVE    RBC, UA <1 0 - 3 /HPF    WBC, UA <1 0 - 2 /HPF    Bacteria, UA NEGATIVE NEGATIVE /HPF    Squam Epithel, UA <1 /HPF    Trichomonas, UA NONE SEEN NONE SEEN /HPF        RADIOLOGY/PROCEDURES         CT ABDOMEN PELVIS W IV CONTRAST Additional Contrast? None (Final result)   Result time 10/15/20 15:49:58   Final result by Dimitri Tolliver MD (10/15/20 15:49:58)                 Impression:     Splenic enlargement with heterogeneous enhancement, along with wedge-shaped   areas of hypoenhancement.  The enlargement is nonspecific, but can be   reactive to inflammation or infection elsewhere.  However, neoplasm   (especially lymphoma) must also be considered. The areas of wedge-shaped hypoenhancement may be secondary to the processes   above, but superimposed necrosis/infarction is more likely. Moderate amount of stool within the colon.  Correlate with clinical evidence   of constipation. Narrative:     EXAMINATION:   CT OF THE ABDOMEN AND PELVIS WITH CONTRAST 10/15/2020 10:56 am     TECHNIQUE:   CT of the abdomen and pelvis was performed with the administration of   intravenous contrast. Multiplanar reformatted images are provided for review.    Dose modulation, iterative reconstruction, and/or weight based adjustment of   the mA/kV changes noted in the lumbar spine.  No acute   bony abnormalities are detected. ED COURSE & MEDICAL DECISION MAKING      Vital signs and nursing notes reviewed during ED course. I have independently evaluated this patient . Supervising MD - Dr Shaniqua Copeland - present in the Emergency Department, available for consultation, throughout entirety of  patient care. All pertinent Lab data and radiographic results reviewed with patient at bedside. The patient and / or the family were informed of the results of any tests, a time was given to answer questions, a plan was proposed and they agreed with plan. Differential diagnosis: Abdominal Aortic Aneurysm, Ischemic Bowel, Bowel Obstruction, Acute Cholecystitis, Acute Appendicitis, other    Clinical  IMPRESSION    1. Constipation, unspecified constipation type    2. Abnormal CT of the abdomen    3. Fecal impaction in rectum [facility])        Patient presents with lower abdominal pain and constipation. On exam, pleasant well-appearing 77-year-old male, afebrile and in no acute respiratory distress. Abdomen is mildly distended but not tight drum, normoactive bowel sounds. No fluid wave. Generalized tenderness across lower abdomen, increased in the right lower quadrant without peritoneal signs rebound or guarding. On rectal exam, no bleeding or thrombosed external hemorrhoids. There is a moderate-sized soft brown stool ball within the rectal vault, no other palpable masses, stool is Hemoccult negative. Did order soapsuds enema. Given history of small bowel obstruction, did order additional labs and CT imaging abdomen pelvis. CBC with leukocytosis of 14.4 with left shift. Normal lactic. CMP without significant derangement, normal renal function. Bilirubin is mildly elevated 1.9 with otherwise normal LFTs and lipase. UA is clear. CT abdomen pelvis with IV contrast shows moderate stool within the colon concern for constipation.   However there was splenic enlargement with heterogeneous enhancement with wedge-shaped areas of hypoenhancement. Findings could be reactive to inflammation versus infection other where but cannot completely rule out neoplasm/lymphoma. Areas of wedge-shaped hypoenhancement may be more likely secondary to to superimposed necrosis versus infarction. Following soapsuds enema, patient reportedly passed a very large soft bowel movement had complete resolution of his discomfort. He has had improvement of his rectal discomfort. Abdominal exam continues to be nonsurgical.  However given the abnormal splenic findings on CT scan, will plan to consult with his general surgeon, Dr. Hernan Bowers. Patient has no localized tenderness or guarding in the left upper quadrant on serial exams. I did consult with Dr. Hernan Bowers - General Surgeon - and discussed patient's history, ED presentation/course including any pertinent laboratory findings and imaging study findings. He/she agrees to follow-up with patient on outpatient basis however does not believe that these incidental splenic findings are acutely surgical.  Would recommend consult to on-call   hematologist oncology for close follow-up to rule out underlying lymphoma. R    I then spoke with Dr. Arlen Newby, on-call for oncology who does agree to follow-up closely with patient. Imaging studies including the abnormal splenic findings and concern for lymphoma versus infarct was discussed with patient and significant other at bedside. They understand these findings and that she he will need very close follow-up with specialty services as cannot completely rule out malignancy/lymphoma. I estimate there is low risk for acute appendicitis, bowel obstruction, cholecystitis, ruptured diverticulitis, incarcerated hernia, hemorrhagic pancreatitis, or perforated bowel/ulcer, thus I consider the discharge disposition reasonable. Patient is discharged at this time stable condition.   Educated on bowel regimen, increase fiber intake, clear fluids and avoid constipating foods. Prescribed senna Colace. Given outpatient follow-up with oncology as well as a general surgeon. Return on his back to the ED discussed. Patient agrees to return emergency department if symptoms worsen or any new symptoms develop. In light of current events, I did utilize appropriate PPE (including N95 face mask, safety glasses, gloves as recommended by the health facility/national standard best practice, during my bedside interactions with the patient. Patient was masked throughout ED course. Full droplet precaution as well as full PPE was followed throughout patient's ED course and evaluation. Attending physician - Dr. Shani Pettit - was consulted on this case, but did not independently evaluate the patient             Comment: Please note this report has been produced using speech recognition software and may contain errors related to that system including errors in grammar, punctuation, and spelling, as well as words and phrases that may be inappropriate. If there are any questions or concerns please feel free to contact the dictating provider for clarification.           Sheila Wakefield PA-C  10/15/20 3681

## 2020-10-16 ENCOUNTER — OFFICE VISIT (OUTPATIENT)
Dept: ONCOLOGY | Age: 75
End: 2020-10-16
Payer: MEDICARE

## 2020-10-16 ENCOUNTER — HOSPITAL ENCOUNTER (OUTPATIENT)
Dept: INFUSION THERAPY | Age: 75
Discharge: HOME | End: 2020-10-16
Payer: MEDICARE

## 2020-10-16 VITALS
BODY MASS INDEX: 29.73 KG/M2 | OXYGEN SATURATION: 96 % | WEIGHT: 196.2 LBS | DIASTOLIC BLOOD PRESSURE: 72 MMHG | TEMPERATURE: 98.6 F | HEIGHT: 68 IN | RESPIRATION RATE: 16 BRPM | SYSTOLIC BLOOD PRESSURE: 143 MMHG | HEART RATE: 70 BPM

## 2020-10-16 DIAGNOSIS — R63.4 WEIGHT LOSS: ICD-10-CM

## 2020-10-16 DIAGNOSIS — R16.1 SPLENOMEGALY: ICD-10-CM

## 2020-10-16 DIAGNOSIS — D72.828 OTHER ELEVATED WHITE BLOOD CELL (WBC) COUNT: ICD-10-CM

## 2020-10-16 LAB
ALBUMIN SERPL-MCNC: 4.3 GM/DL (ref 3.4–5)
ALP BLD-CCNC: 116 IU/L (ref 40–129)
ALT SERPL-CCNC: 15 U/L (ref 10–40)
ANION GAP SERPL CALCULATED.3IONS-SCNC: 10 MMOL/L (ref 4–16)
AST SERPL-CCNC: 20 IU/L (ref 15–37)
BASOPHILS ABSOLUTE: 0.5 K/CU MM
BASOPHILS RELATIVE PERCENT: 3.8 % (ref 0–1)
BILIRUB SERPL-MCNC: 1.4 MG/DL (ref 0–1)
BUN BLDV-MCNC: 8 MG/DL (ref 6–23)
CALCIUM SERPL-MCNC: 8.8 MG/DL (ref 8.3–10.6)
CHLORIDE BLD-SCNC: 99 MMOL/L (ref 99–110)
CO2: 29 MMOL/L (ref 21–32)
CREAT SERPL-MCNC: 1 MG/DL (ref 0.9–1.3)
DIFFERENTIAL TYPE: ABNORMAL
EOSINOPHILS ABSOLUTE: 0.7 K/CU MM
EOSINOPHILS RELATIVE PERCENT: 5.2 % (ref 0–3)
ERYTHROCYTE SEDIMENTATION RATE: 2 MM/HR (ref 0–20)
GFR AFRICAN AMERICAN: >60 ML/MIN/1.73M2
GFR NON-AFRICAN AMERICAN: >60 ML/MIN/1.73M2
GLUCOSE BLD-MCNC: 70 MG/DL (ref 70–99)
HCT VFR BLD CALC: 47.7 % (ref 42–52)
HEMOGLOBIN: 16 GM/DL (ref 13.5–18)
LACTATE DEHYDROGENASE: 231 IU/L (ref 120–246)
LYMPHOCYTES ABSOLUTE: 1.3 K/CU MM
LYMPHOCYTES RELATIVE PERCENT: 9.1 % (ref 24–44)
MCH RBC QN AUTO: 28.6 PG (ref 27–31)
MCHC RBC AUTO-ENTMCNC: 33.5 % (ref 32–36)
MCV RBC AUTO: 85.2 FL (ref 78–100)
MONOCYTES ABSOLUTE: 1 K/CU MM
MONOCYTES RELATIVE PERCENT: 7 % (ref 0–4)
PDW BLD-RTO: 18.7 % (ref 11.7–14.9)
PLATELET # BLD: 387 K/CU MM (ref 140–440)
PMV BLD AUTO: 9.9 FL (ref 7.5–11.1)
POTASSIUM SERPL-SCNC: 4.3 MMOL/L (ref 3.5–5.1)
RBC # BLD: 5.6 M/CU MM (ref 4.6–6.2)
SEGMENTED NEUTROPHILS ABSOLUTE COUNT: 10.6 K/CU MM
SEGMENTED NEUTROPHILS RELATIVE PERCENT: 74.9 % (ref 36–66)
SODIUM BLD-SCNC: 138 MMOL/L (ref 135–145)
TOTAL PROTEIN: 6.2 GM/DL (ref 6.4–8.2)
WBC # BLD: 14.1 K/CU MM (ref 4–10.5)

## 2020-10-16 PROCEDURE — 85025 COMPLETE CBC W/AUTO DIFF WBC: CPT

## 2020-10-16 PROCEDURE — 3017F COLORECTAL CA SCREEN DOC REV: CPT | Performed by: INTERNAL MEDICINE

## 2020-10-16 PROCEDURE — 99211 OFF/OP EST MAY X REQ PHY/QHP: CPT

## 2020-10-16 PROCEDURE — 86320 SERUM IMMUNOELECTROPHORESIS: CPT

## 2020-10-16 PROCEDURE — 83615 LACTATE (LD) (LDH) ENZYME: CPT

## 2020-10-16 PROCEDURE — 84165 PROTEIN E-PHORESIS SERUM: CPT

## 2020-10-16 PROCEDURE — 83883 ASSAY NEPHELOMETRY NOT SPEC: CPT

## 2020-10-16 PROCEDURE — 80053 COMPREHEN METABOLIC PANEL: CPT

## 2020-10-16 PROCEDURE — G8417 CALC BMI ABV UP PARAM F/U: HCPCS | Performed by: INTERNAL MEDICINE

## 2020-10-16 PROCEDURE — 1123F ACP DISCUSS/DSCN MKR DOCD: CPT | Performed by: INTERNAL MEDICINE

## 2020-10-16 PROCEDURE — 4040F PNEUMOC VAC/ADMIN/RCVD: CPT | Performed by: INTERNAL MEDICINE

## 2020-10-16 PROCEDURE — 82232 ASSAY OF BETA-2 PROTEIN: CPT

## 2020-10-16 PROCEDURE — G8427 DOCREV CUR MEDS BY ELIG CLIN: HCPCS | Performed by: INTERNAL MEDICINE

## 2020-10-16 PROCEDURE — 99214 OFFICE O/P EST MOD 30 MIN: CPT | Performed by: INTERNAL MEDICINE

## 2020-10-16 PROCEDURE — 85652 RBC SED RATE AUTOMATED: CPT

## 2020-10-16 PROCEDURE — G8482 FLU IMMUNIZE ORDER/ADMIN: HCPCS | Performed by: INTERNAL MEDICINE

## 2020-10-16 PROCEDURE — 1036F TOBACCO NON-USER: CPT | Performed by: INTERNAL MEDICINE

## 2020-10-16 PROCEDURE — 36415 COLL VENOUS BLD VENIPUNCTURE: CPT

## 2020-10-16 RX ORDER — AMLODIPINE BESYLATE 2.5 MG/1
TABLET ORAL
COMMUNITY
End: 2020-10-16

## 2020-10-16 RX ORDER — PROBENECID AND COLCHICINE 500; .5 MG/1; MG/1
1 TABLET ORAL
COMMUNITY
End: 2020-10-16

## 2020-10-16 RX ORDER — HYDROCODONE BITARTRATE AND ACETAMINOPHEN 5; 325 MG/1; MG/1
TABLET ORAL
COMMUNITY
End: 2020-10-16

## 2020-10-16 RX ORDER — TAMSULOSIN HYDROCHLORIDE 0.4 MG/1
0.4 CAPSULE ORAL DAILY
COMMUNITY
End: 2022-01-06

## 2020-10-16 RX ORDER — PREDNISONE 20 MG/1
TABLET ORAL
COMMUNITY
End: 2021-04-15 | Stop reason: ALTCHOICE

## 2020-10-16 RX ORDER — CEFDINIR 300 MG/1
CAPSULE ORAL
COMMUNITY
End: 2021-04-15 | Stop reason: ALTCHOICE

## 2020-10-16 NOTE — PROGRESS NOTES
Patient Name: Roger Lopez  Patient : 1945  Patient MRN: [id number]     Primary Oncologist: Gustavo Tyson MD  Referring Provider: Pam Ziegler MD     Date of Service: 10/16/2020      Chief Complaint:   Chief Complaint   Patient presents with    Follow-up    Results     CT scan     Patient Active Problem List:     Acute deep vein thrombosis (DVT) of right lower extremity      Recurrent deep venous thrombosis      Splenomegaly     Leukocytosis    HPI:   Roger Lopez is a 71-year-old pleasant gentleman with medical history significant for hypertension, GERD and recurrent VTE, referred to me on 2020 for evaluation of his splenomegaly. He stated that he presented to [place] on 10[place]15/20 with lower abdominal pain and constipation. He was found to have impacted stool and he was treated with soapsuds enema. His symptom resolved after he had bowel movement. Because of his history of small bowel obstruction, CT scan was ordered. It showed splenic enlargement with heterogeneous enhancement, along with wedge-shaped areas of hypo enhancement. The enlargement is nonspecific, but can be reactive to inflammation or infection elsewhere. However, neoplasm (especially lymphoma) must also be considered. The areas of wedge-shaped hypoenhancement may be secondary to the reactive process, but superimposed necrosis/infarction is more likely. He was released from hospital with an appointment to see me as an out patient. He has about 35 pound weight loss over one year duration. Denies unexplained fever or drenching night sweat. There is no palpable lymphadenopathy on physical exam.     He blamed his weight loss could be due to his tooth ache. He doesn't have any significant symptoms at today visit.      Patient's previous history of VTE  He reported that he hit his left leg to the side of his truck and then developed a LLE DVT in  when he was placed on BASOSABS 0.4 10/15/2020    LYMPHSABS 0.8 10/15/2020    MONOSABS 0.7 10/15/2020    DIFFTYPE AUTOMATED DIFFERENTIAL 10/15/2020    PLTM SEVERAL LARGE PLATELETS 28/76/8775     Lab Results   Component Value Date    ESR 14 08/12/2020     Chemistry:  Lab Results   Component Value Date     (L) 10/15/2020    K 3.7 10/15/2020    CL 98 (L) 10/15/2020    CO2 24 10/15/2020    BUN 8 10/15/2020    CREATININE 0.9 10/15/2020    GLUCOSE 116 (H) 10/15/2020    CALCIUM 9.0 10/15/2020    PROT 6.0 (L) 10/15/2020    LABALBU 3.8 10/15/2020    BILITOT 1.9 (H) 10/15/2020    ALKPHOS 98 10/15/2020    AST 21 10/15/2020    ALT 15 10/15/2020    LABGLOM >60 10/15/2020    GFRAA >60 10/15/2020    MG 2.2 08/10/2020    POCGLU 124 (H) 06/02/2017     Lab Results   Component Value Date     (H) 08/09/2020    HOMOCYSTEINE 19.1 (H) 03/10/2019     No components found for: LD  Lab Results   Component Value Date    TSHHS 0.591 07/12/2018     Immunology:  Lab Results   Component Value Date    PROT 6.0 (L) 10/15/2020     No results found for: Christine Kirkland, KLFLCR  No results found for: B2M  Coagulation Panel:  Lab Results   Component Value Date    PROTIME 16.2 (H) 08/10/2020    INR 1.33 08/10/2020    APTT 36.4 08/10/2020    DDIMER 837 (H) 08/10/2020     Anemia Panel:  No results found for: [id number], FOLATE  Tumor Markers:  No results found for: , CEA, , LABCA2, PSA  Observations:  No data recorded      Assessment & Plan:   Recurrent VTE  Splenomegaly    WILLY Ennis is a 70-year-old pleasant gentleman with medical history significant for recurrent VTE, who was found to have splenomegaly when he presented with lower abdominal pain and constipation. CT scan showed splenic enlargement with heterogeneous enhancement, along with wedge-shaped areas of hypo enhancement. I believe his splenomegaly could be due to reactive to inflammation or infection.  However, I cannot rule out lymphoproliferative or myeloproliferative neoplasm completely. The areas of wedge-shaped hypoenhancement may be secondary to prior infarction. He is on xarelto and I recommend him to continue with it for now. He has persistent leukocytosis since 2018. Prior to 2018, he has off and on episode of leukocytosis. I recommend to send proper work ups today, including flow cytometry, BCR/ABL1, JAK2, MPL, CALR, LDH, ESR, SPEP, serum immunofixation today. I will see him back in two weeks. If all the results are normal, I most likely will follow him closely. I answered all his questions and concerns for today. I recommend him to follow up with primary care physician, Dr. Monico Nageotte on regular basis. Thank you for allowing me to participate in the care of this very pleasant gentleman. Recent imaging and labs were reviewed and discussed with the patient.

## 2020-10-16 NOTE — PROGRESS NOTES
MA Rooming Questions  Patient: Silvio Parsons  MRN: [id number]    Date: 10/16/2020        1. Do you have any new issues? yes - constipation         2. Do you need any refills on medications?    no    3. Have you had any imaging done since your last visit? yes - CT    4. Have you been hospitalized or seen in the emergency room since your last visit here?   no    5. Did the patient have a depression screening completed today?  No    No data recorded     PHQ-9 Given to (if applicable):               PHQ-9 Score (if applicable):                     [] Positive     []  Negative              Does question #9 need addressed (if applicable)                     [] Yes    []  No               Savannah Roque MA

## 2020-10-19 LAB
ALBUMIN ELP: 3.4 GM/DL (ref 3.2–5.6)
ALPHA-1-GLOBULIN: 0.3 GM/DL (ref 0.1–0.4)
ALPHA-2-GLOBULIN: 0.7 GM/DL (ref 0.4–1.2)
BETA GLOBULIN: 0.9 GM/DL (ref 0.5–1.3)
BETA-2 MICROGLOBULIN: 4.1 MG/L (ref 1.1–2.4)
GAMMA GLOBULIN: 0.9 GM/DL (ref 0.5–1.6)
KAPPA QUANT FREE LIGHT CHAINS: 28.82 MG/L (ref 3.3–19.4)
KAPPA/LAMBDA FREE LIGHT CHAIN RATIO: 1.46 (ref 0.26–1.65)
LAMBDA FREE LIGHT CHAINS URINE/ VOL: 19.74 MG/L (ref 5.71–26.3)
SPEP INTERPRETATION: ABNORMAL
SPEP INTERPRETATION: NORMAL
TOTAL PROTEIN: 6.2 GM/DL (ref 6.4–8.2)

## 2020-10-29 LAB
BCR/ABL T(9,22): NORMAL
COMMENT: NORMAL
JAK2 GENE MUTATION QUAL: NORMAL

## 2020-10-29 NOTE — PROGRESS NOTES
Patient Name: Romelia Cordero  Patient : 1945  Patient MRN: [id number]     Primary Oncologist: Willy Johnson MD  Referring Provider: Jose Pagan MD     Date of Service: 10/30/2020      Chief Complaint:   Chief Complaint   Patient presents with    Follow-up     Patient Active Problem List:     Recurrent deep venous thrombosis      Splenomegaly     JAK2 positive myeloproliferative neoplasm    HPI:   Romelia Cordero is a 78-year-old pleasant gentleman with medical history significant for hypertension, GERD and recurrent VTE, currently on long term anticoagulation therapy with Xarelto, referred to me on 2020 for evaluation of his splenomegaly. He stated that he presented to [facility] on 10/15/20 with lower abdominal pain and constipation. He was found to have impacted stool and he was treated with soapsuds enema. His symptom resolved after he had bowel movement. Because of his history of small bowel obstruction, CT scan was ordered. It showed splenic enlargement with heterogeneous enhancement, along with wedge-shaped areas of hypo enhancement. The enlargement is nonspecific, but can be reactive to inflammation or infection elsewhere. However, neoplasm (especially lymphoma) must also be considered. The areas of wedge-shaped hypoenhancement may be secondary to the reactive process, but superimposed necrosis/infarction is more likely. He was released from hospital with an appointment to see me as an out patient. He has about 35 pound weight loss over one year duration. Laboratory work ups done on 10/16/20 showed mild leukocytosis (WBC 14.1) and JAK2 V617F mutation, consistent with JAK2 positive myeloproliferative neoplasm. The rests of the blood test, including flow cytometry, JAK2 exon 12-15, MPL, CALR, SPEP, serum immunofixation, ESR and LDH were within normal range. On 2020, he presented to me for follow up.  On today visit, I reviewed with him findings on laboratory tests. I believe his splenomegaly is due to JAK2 positive myeloproliferative neoplasm. The areas of wedge-shaped hypoenhancement are due to splenic infarction, secondary to JAK2 related hypercoagulable state. He is currently on Xarelto 20 mg daily and I recommend him to add aspirin 81 mg daily to prevent both arterial and venous thromboembolic episodes. In addition, I recommend him to have therapeutic phlebotomy to keep his hematocrit less than 45%. I will consider to add hydroxyurea whenever his platelet count goes up more than normal range. He is very high risk to have thromboemobolic episodes. Besides weight loss, he doesn't have any other significant symptoms at today visit. Patient's previous history of VTE  He reported that he hit his left leg to the side of his truck and then developed a LLE DVT in 2014 when he was placed on Coumadin. He was on it for 6 months. It looks like he developed RLE(does not remember any provoking factors) in 2015 when he was placed on coumadin which was switched to Xarelto prior to his PCP retired. Reported that in November 2017 he was off of Xarelto for 7 days for teeth extraction. Developed RLE DVT. Xarelto resumed and and has been very compliant with it since. On March 3, 2019, he presented with right calf pain and swelling. Doppler showed not compressible in the region of popliteal vein and distal femoral vein which suggests DVT. He was switched to heparin, transition to coumadin. Later on, he was switched back to xarelto since he has difficulty achiever continuous therapeutic INR. (also wasn't sure doppler done on 3/2019 showed chronic or acute DVT). He has been on xarelto since then.      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 Past Medical History  History of DVT  Hypertension  Herpes zoster  GERD    Surgical History  Cholecystectomy   Laparoscopic appendectomy  Mandible fracture surgery    Allergies  allopurinol    Social History  Retired 12 years ago, worked at [employer]. No tob/etoh or any other illicit drug abuse. Family History  Reported that brother had a history of malignancy but was not sure what kind of malignancy. Review of Systems: \"Per interval history; otherwise 10 point ROS is negative. \"  His energy level is pretty good, appetite, and sleep are stable. He doesn't have fever, chills, night sweats, cough, shortness of breath, chest pain, hemoptysis, or palpitations. His bowel and bladder functions are malia. He denies nausea, vomiting, abdominal pain, diarrhea, constipation, dysuria, loss of appetite, or weight loss. He doesn't have neuropathy and he denies bleeding or clotting issues. He denies any pain on today visit. No anxiety or depression. The rest of the systems are unremarkable.      Vital Signs:  BP (!) 145/67 (Site: Left Upper Arm, Position: Sitting, Cuff Size: Large Adult)   Pulse 67   Temp 98 °F (36.7 °C) (Infrared)   Resp 16   Ht 5' 8\" (1.727 m)   Wt 193 lb (87.5 kg)   SpO2 96%   BMI 29.35 kg/m²     Physical Exam:  CONSTITUTIONAL: awake, alert, cooperative, no apparent distress   EYES: pupils equal, round and reactive to light, sclera clear and conjunctiva normal  ENT: Normocephalic, without obvious abnormality, atraumatic  NECK: supple, symmetrical, no jugular venous distension and no carotid bruits   HEMATOLOGIC/LYMPHATIC: no cervical, supraclavicular or axillary lymphadenopathy   LUNGS: VBS, no rhonchi, no increased work of breathing and clear to auscultation, no wheezes, no crackles,    CARDIOVASCULAR: regular rate and rhythm, normal S1 and S2, no murmur noted  ABDOMEN: soft, non-distended, normal bowel sounds x 4, non-tender, no masses palpated, no hepatosplenomegaly   MUSCULOSKELETAL: full range of motion noted, tone is normal  NEUROLOGIC: awake, alert, oriented to name, place and time. Motor skills grossly intact. SKIN: Normal skin color, texture, turgor and no jaundice.  appears intact   EXTREMITIES: no leg swelling, no cyanosis, no LE edema, no clubbing     Labs:  Hematology:  Lab Results   Component Value Date    WBC 14.1 (H) 10/16/2020    RBC 5.60 10/16/2020    HGB 16.0 10/16/2020    HCT 47.7 10/16/2020    MCV 85.2 10/16/2020    MCH 28.6 10/16/2020    MCHC 33.5 10/16/2020    RDW 18.7 (H) 10/16/2020     10/16/2020    MPV 9.9 10/16/2020    BANDSPCT 3 (L) 08/11/2020    SEGSPCT 74.9 (H) 10/16/2020    EOSRELPCT 5.2 (H) 10/16/2020    BASOPCT 3.8 (H) 10/16/2020    LYMPHOPCT 9.1 (L) 10/16/2020    MONOPCT 7.0 (H) 10/16/2020    BANDABS 0.50 08/11/2020    SEGSABS 10.6 10/16/2020    EOSABS 0.7 10/16/2020    BASOSABS 0.5 10/16/2020    LYMPHSABS 1.3 10/16/2020    MONOSABS 1.0 10/16/2020    DIFFTYPE AUTOMATED DIFFERENTIAL 10/16/2020    PLTM SEVERAL LARGE PLATELETS 34/04/5310     Lab Results   Component Value Date    ESR 2 10/16/2020     Chemistry:  Lab Results   Component Value Date     10/16/2020    K 4.3 10/16/2020    CL 99 10/16/2020    CO2 29 10/16/2020    BUN 8 10/16/2020    CREATININE 1.0 10/16/2020    GLUCOSE 70 10/16/2020    CALCIUM 8.8 10/16/2020    PROT 6.2 (L) 10/16/2020    PROT 6.2 (L) 10/16/2020    LABALBU 4.3 10/16/2020    BILITOT 1.4 (H) 10/16/2020    ALKPHOS 116 10/16/2020    AST 20 10/16/2020    ALT 15 10/16/2020    LABGLOM >60 10/16/2020    GFRAA >60 10/16/2020    MG 2.2 08/10/2020    POCGLU 124 (H) 06/02/2017     Lab Results   Component Value Date     10/16/2020    HOMOCYSTEINE 19.1 (H) 03/10/2019     No components found for: LD  Lab Results   Component Value Date    TSHHS 0.591 07/12/2018     Immunology:  Lab Results   Component Value Date    PROT 6.2 (L) 10/16/2020    PROT 6.2 (L) 10/16/2020    SPEP 10/16/2020     INTERPRETATION - No monoclonal proteins identified. SAF    ALBUMINELP 3.4 10/16/2020    LABALPH 0.3 10/16/2020    LABALPH 0.7 10/16/2020    LABBETA 0.9 10/16/2020    GAMGLOB 0.9 10/16/2020     Lab Results   Component Value Date    KAPPAUVOL 28.82 (H) 10/16/2020    LAMBDAUVOL 19.74 10/16/2020    KLFLCR 1.46 10/16/2020     Lab Results   Component Value Date    B2M 4.1 (H) 10/16/2020     Coagulation Panel:  Lab Results   Component Value Date    PROTIME 16.2 (H) 08/10/2020    INR 1.33 08/10/2020    APTT 36.4 08/10/2020    DDIMER 837 (H) 08/10/2020     Anemia Panel:  No results found for: [id number], FOLATE  Tumor Markers:  No results found for: , CEA, , LABCA2, PSA  Observations:  No data recorded      Assessment & Plan:   Recurrent VTE  Splenomegaly  JAK2 V617F myeloproliferative neoplasm    PLAN  Alon Lei is a 70-year-old pleasant gentleman with medical history significant for recurrent VTE, who was found to have splenomegaly when he presented with lower abdominal pain and constipation. CT scan showed splenic enlargement with heterogeneous enhancement, along with wedge-shaped areas of hypo enhancement. Laboratory work ups done on 10/16/20 showed mild leukocytosis (WBC 14.1) and JAK2 V617F mutation, consistent with JAK2 positive myeloproliferative neoplasm. The rests of the blood test, including flow cytometry, JAK2 exon 12-15, MPL, CALR, SPEP, serum immunofixation, ESR and LDH were within normal range. On October 30, 2020, he presented to me for follow up. On today visit, I reviewed with him findings on laboratory tests. I believe his splenomegaly is due to JAK2 positive myeloproliferative neoplasm. The areas of wedge-shaped hypoenhancement are due to splenic infarction, secondary to JAK2 related hypercoagulable state.     He is currently on Xarelto 20 mg daily and I recommend him to add aspirin 81 mg daily to prevent both arterial and venous thromboembolic episodes. In addition, I recommend him to have therapeutic phlebotomy to keep his hematocrit less than 45%. I will consider to add hydroxyurea whenever his platelet count goes up more than normal range. He is very high risk to have thromboemobolic episodes. I answered all his questions and concerns for today. I recommend him to follow up with primary care physician, Dr. Clara Covarrubias on regular basis. I will continue to keep you updated on his progress. Thank you for allowing me to participate in the care of this very pleasant gentleman. Recent imaging and labs were reviewed and discussed with the patient.

## 2020-10-30 ENCOUNTER — CLINICAL DOCUMENTATION (OUTPATIENT)
Dept: ONCOLOGY | Age: 75
End: 2020-10-30

## 2020-10-30 ENCOUNTER — OFFICE VISIT (OUTPATIENT)
Dept: ONCOLOGY | Age: 75
End: 2020-10-30
Payer: MEDICARE

## 2020-10-30 ENCOUNTER — HOSPITAL ENCOUNTER (OUTPATIENT)
Dept: INFUSION THERAPY | Age: 75
Discharge: HOME | End: 2020-10-30
Payer: MEDICARE

## 2020-10-30 VITALS
TEMPERATURE: 98 F | HEIGHT: 68 IN | BODY MASS INDEX: 29.25 KG/M2 | RESPIRATION RATE: 16 BRPM | WEIGHT: 193 LBS | SYSTOLIC BLOOD PRESSURE: 145 MMHG | HEART RATE: 67 BPM | OXYGEN SATURATION: 96 % | DIASTOLIC BLOOD PRESSURE: 67 MMHG

## 2020-10-30 DIAGNOSIS — D47.1 MYELOPROLIFERATIVE NEOPLASM (HCC): ICD-10-CM

## 2020-10-30 PROCEDURE — 99211 OFF/OP EST MAY X REQ PHY/QHP: CPT

## 2020-10-30 PROCEDURE — 4040F PNEUMOC VAC/ADMIN/RCVD: CPT | Performed by: INTERNAL MEDICINE

## 2020-10-30 PROCEDURE — 3017F COLORECTAL CA SCREEN DOC REV: CPT | Performed by: INTERNAL MEDICINE

## 2020-10-30 PROCEDURE — G8482 FLU IMMUNIZE ORDER/ADMIN: HCPCS | Performed by: INTERNAL MEDICINE

## 2020-10-30 PROCEDURE — G8427 DOCREV CUR MEDS BY ELIG CLIN: HCPCS | Performed by: INTERNAL MEDICINE

## 2020-10-30 PROCEDURE — 99214 OFFICE O/P EST MOD 30 MIN: CPT | Performed by: INTERNAL MEDICINE

## 2020-10-30 PROCEDURE — 1123F ACP DISCUSS/DSCN MKR DOCD: CPT | Performed by: INTERNAL MEDICINE

## 2020-10-30 PROCEDURE — G8417 CALC BMI ABV UP PARAM F/U: HCPCS | Performed by: INTERNAL MEDICINE

## 2020-10-30 PROCEDURE — 1036F TOBACCO NON-USER: CPT | Performed by: INTERNAL MEDICINE

## 2020-10-30 NOTE — PROGRESS NOTES
MA Rooming Questions  Patient: Madelaine Orozco  MRN: [id number]    Date: 10/30/2020        1. Do you have any new issues?   no         2. Do you need any refills on medications?    no    3. Have you had any imaging done since your last visit?   no    4. Have you been hospitalized or seen in the emergency room since your last visit here?   no    5. Did the patient have a depression screening completed today?  No    No data recorded     PHQ-9 Given to (if applicable):               PHQ-9 Score (if applicable):                     [] Positive     []  Negative              Does question #9 need addressed (if applicable)                     [] Yes    []  No               Hamlet Albarado MA

## 2020-11-06 ENCOUNTER — HOSPITAL ENCOUNTER (OUTPATIENT)
Dept: INFUSION THERAPY | Age: 75
Setting detail: INFUSION SERIES
End: 2020-11-06
Payer: MEDICARE

## 2020-11-06 ENCOUNTER — TELEPHONE (OUTPATIENT)
Dept: INFUSION THERAPY | Age: 75
End: 2020-11-06

## 2020-11-06 NOTE — TELEPHONE ENCOUNTER
PT CALLED [facility] TO [facility] AND LEFT MESSAGE ON PHONE THAT HE WOULD BE CANCELLING HIS APPT FOR PHLEBOTOMY D/T AN EMERGENCY.   OUTPATIENT INFUSION NOTIFIED PER MIGUEL ÁNGEL WEST MA.

## 2020-11-17 ENCOUNTER — HOSPITAL ENCOUNTER (OUTPATIENT)
Dept: INFUSION THERAPY | Age: 75
Setting detail: INFUSION SERIES
Discharge: HOME | End: 2020-11-17
Payer: MEDICARE

## 2020-11-17 VITALS
SYSTOLIC BLOOD PRESSURE: 122 MMHG | OXYGEN SATURATION: 98 % | TEMPERATURE: 97.9 F | RESPIRATION RATE: 16 BRPM | DIASTOLIC BLOOD PRESSURE: 63 MMHG | HEART RATE: 68 BPM

## 2020-11-17 DIAGNOSIS — D47.1 MYELOPROLIFERATIVE NEOPLASM (HCC): Primary | ICD-10-CM

## 2020-11-17 PROCEDURE — 99195 PHLEBOTOMY: CPT

## 2020-11-17 PROCEDURE — 99211 OFF/OP EST MAY X REQ PHY/QHP: CPT

## 2020-11-17 NOTE — PROGRESS NOTES
Tolerated appt well. Reviewed discharge instruction, voiced understanding. Copies of AVS given. Pt discharged home. Pt to exit via steady gait. No orders of the defined types were placed in this encounter.

## 2020-11-17 NOTE — PROGRESS NOTES
Diagnosis: Myeloproliferative  neoplasm    Pre-Phlebotomy: BP /69   Pulse 63   Resp 14   SpO2 98%        Post-Phlebotomy: /67, HR 67     Volume Removed: 953 grams    Complications: none    Comments: tolerated well          LOT# [id number]    Exp: 7-2022      Cinthia Heath. Stephanie Bright

## 2021-01-03 NOTE — PROGRESS NOTES
Patient Name: Sherry Ocasio  Patient : 1945  Patient MRN: [id number]     Primary Oncologist: Luz Maria Yañez MD  Referring Provider: Nicolasa Jade MD     Date of Service: 2021      Chief Complaint:   Chief Complaint   Patient presents with   [address]     Patient Active Problem List:     Recurrent deep venous thrombosis      Splenomegaly     JAK2 positive myeloproliferative neoplasm    HPI:   Sherry Ocasio is a 59-year-old pleasant gentleman with medical history significant for hypertension, GERD and recurrent VTE, currently on long term anticoagulation therapy with Xarelto, referred to me on 2020 for evaluation of his splenomegaly. He stated that he presented to [facility] on 10/15/20 with lower abdominal pain and constipation. He was found to have impacted stool and he was treated with soapsuds enema. His symptom resolved after he had bowel movement. Because of his history of small bowel obstruction, CT scan was ordered. It showed splenic enlargement with heterogeneous enhancement, along with wedge-shaped areas of hypo enhancement. The enlargement is nonspecific, but can be reactive to inflammation or infection elsewhere. However, neoplasm (especially lymphoma) must also be considered. The areas of wedge-shaped hypoenhancement may be secondary to the reactive process, but superimposed necrosis/infarction is more likely. He was released from hospital with an appointment to see me as an out patient. He has about 35 pound weight loss over one year duration. Laboratory work ups done on 10/16/20 showed mild leukocytosis (WBC 14.1) and JAK2 V617F mutation, consistent with JAK2 positive myeloproliferative neoplasm. The rests of the blood test, including flow cytometry, JAK2 exon 12-15, MPL, CALR, SPEP, serum immunofixation, ESR and LDH were within normal range. On 2021, he presented to me for follow up.   I have been following him for JAK2 positive myeloproliferative neoplasm and he required therapeutic phlebotomy since diagnosis. I believe his splenomegaly is due to JAK2 positive myeloproliferative neoplasm. The areas of wedge-shaped hypoenhancement are due to splenic infarction, secondary to JAK2 related hypercoagulable state. He is currently on Xarelto 20 mg daily and I added aspirin 81 mg daily since 10/30/20 to prevent both arterial and venous thromboembolic episodes. I recommend him to have therapeutic phlebotomy to keep his hematocrit less than 45%. Since his hematocrit today was 48.6, I recommended to have one phlebotomy within a week. I will consider to add hydroxyurea whenever his platelet count goes up more than normal range. He is very high risk to have thromboemobolic episodes. He doesn't have any significant symptoms at today visit. Patient's previous history of VTE  He reported that he hit his left leg to the side of his truck and then developed a LLE DVT in 2014 when he was placed on Coumadin. He was on it for 6 months. It looks like he developed RLE(does not remember any provoking factors) in 2015 when he was placed on coumadin which was switched to Xarelto prior to his PCP retired. Reported that in November 2017 he was off of Xarelto for 7 days for teeth extraction. Developed RLE DVT. Xarelto resumed and and has been very compliant with it since. On March 3, 2019, he presented with right calf pain and swelling. Doppler showed not compressible in the region of popliteal vein and distal femoral vein which suggests DVT. He was switched to heparin, transition to coumadin. Later on, he was switched back to xarelto since he has difficulty achiever continuous therapeutic INR. (also wasn't sure doppler done on 3/2019 showed chronic or acute DVT). He has been on xarelto since then.      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 Past Medical History  History of DVT  Hypertension  Herpes zoster  GERD    Surgical History  Cholecystectomy   Laparoscopic appendectomy  Mandible fracture surgery    Allergies  allopurinol    Social History  Retired 12 years ago, worked at [employer]. No tob/etoh or any other illicit drug abuse. Family History  Reported that brother had a history of malignancy but was not sure what kind of malignancy. Review of Systems: \"Per interval history; otherwise 10 point ROS is negative. \"  His energy level is fair, appetite, and sleep are good. He denies fever, chills, night sweats, cough, shortness of breath, chest pain, hemoptysis, or palpitations. His bowel and bladder functions are malia. He doesn't have nausea, vomiting, abdominal pain, diarrhea, constipation, dysuria, loss of appetite, or weight loss. He denies neuropathy and he doesn't have bleeding or clotting issues. He doesn't have any pain on today visit. Denies anxiety or depression. The rest of the systems are unremarkable.      Vital Signs:  BP (!) 149/76 (Site: Right Upper Arm, Position: Sitting, Cuff Size: Large Adult)   Pulse 64   Temp 97.5 °F (36.4 °C) (Infrared)   Resp 16   Ht 5' 8\" (1.727 m)   Wt 206 lb 12.8 oz (93.8 kg)   SpO2 95%   BMI 31.44 kg/m²     Physical Exam:  CONSTITUTIONAL: awake, no apparent distress, alert, cooperative,    EYES: pupils equal, round and reactive to light, sclera clear and conjunctiva normal  ENT: without obvious abnormality, normocephalic, atraumatic  NECK: supple, symmetrical, no jugular venous distension and no carotid bruits   HEMATOLOGIC/LYMPHATIC: no cervical, supraclavicular or axillary lymphadenopathy   LUNGS: VBS, no wheezes, no rhonchi, no increased work of breathing and clear to auscultation, no crackles, CARDIOVASCULAR: normal S1 and S2, regular rate and rhythm, no murmur noted  ABDOMEN: soft, non-distended, non-tender, normal bowel sounds x 4, no masses palpated, no hepatosplenomegaly   MUSCULOSKELETAL: full range of motion noted, tone is normal  NEUROLOGIC: awake, alert, oriented to name, place and time. Motor skills grossly intact. SKIN: Normal skin color, texture, turgor and no jaundice.  appears intact   EXTREMITIES: no cyanosis, no LE edema, no leg swelling, no clubbing     Labs:  Hematology:  Lab Results   Component Value Date    WBC 15.5 (H) 01/05/2021    RBC 5.75 01/05/2021    HGB 15.9 01/05/2021    HCT 48.6 01/05/2021    MCV 84.5 01/05/2021    MCH 27.7 01/05/2021    MCHC 32.7 01/05/2021    RDW 16.6 (H) 01/05/2021     01/05/2021    MPV 9.9 01/05/2021    BANDSPCT 3 (L) 08/11/2020    SEGSPCT 74.2 (H) 01/05/2021    EOSRELPCT 6.8 (H) 01/05/2021    BASOPCT 4.1 (H) 01/05/2021    LYMPHOPCT 9.3 (L) 01/05/2021    MONOPCT 5.6 (H) 01/05/2021    BANDABS 0.50 08/11/2020    SEGSABS 11.5 01/05/2021    EOSABS 1.1 01/05/2021    BASOSABS 0.6 01/05/2021    LYMPHSABS 1.4 01/05/2021    MONOSABS 0.9 01/05/2021    DIFFTYPE AUTOMATED DIFFERENTIAL 01/05/2021    PLTM SEVERAL LARGE PLATELETS 13/41/1540     Lab Results   Component Value Date    ESR 2 10/16/2020     Chemistry:  Lab Results   Component Value Date     10/16/2020    K 4.3 10/16/2020    CL 99 10/16/2020    CO2 29 10/16/2020    BUN 8 10/16/2020    CREATININE 1.0 10/16/2020    GLUCOSE 70 10/16/2020    CALCIUM 8.8 10/16/2020    PROT 6.2 (L) 10/16/2020    PROT 6.2 (L) 10/16/2020    LABALBU 4.3 10/16/2020    BILITOT 1.4 (H) 10/16/2020    ALKPHOS 116 10/16/2020    AST 20 10/16/2020    ALT 15 10/16/2020    LABGLOM >60 10/16/2020    GFRAA >60 10/16/2020    MG 2.2 08/10/2020    POCGLU 124 (H) 06/02/2017     Lab Results   Component Value Date     10/16/2020    HOMOCYSTEINE 19.1 (H) 03/10/2019     No components found for: LD  Lab Results   Component Value Date TSHHS 0.591 07/12/2018     Immunology:  Lab Results   Component Value Date    PROT 6.2 (L) 10/16/2020    PROT 6.2 (L) 10/16/2020    SPEP  10/16/2020     INTERPRETATION - No monoclonal proteins identified. SAF    ALBUMINELP 3.4 10/16/2020    LABALPH 0.3 10/16/2020    LABALPH 0.7 10/16/2020    LABBETA 0.9 10/16/2020    GAMGLOB 0.9 10/16/2020     Lab Results   Component Value Date    KAPPAUVOL 28.82 (H) 10/16/2020    LAMBDAUVOL 19.74 10/16/2020    KLFLCR 1.46 10/16/2020     Lab Results   Component Value Date    B2M 4.1 (H) 10/16/2020     Coagulation Panel:  Lab Results   Component Value Date    PROTIME 16.2 (H) 08/10/2020    INR 1.33 08/10/2020    APTT 36.4 08/10/2020    DDIMER 837 (H) 08/10/2020     Anemia Panel:  No results found for: Maya Scot, FOLATE  Tumor Markers:  No results found for: , CEA, , LABCA2, PSA  Observations:  PHQ-9 Total Score: 0 (1/5/2021 10:38 AM)     Assessment & Plan:   Recurrent VTE  Splenomegaly  JAK2 V617F myeloproliferative neoplasm    PLAN  Rc Rosales is a 51-year-old pleasant gentleman with medical history significant for recurrent VTE, who was found to have splenomegaly when he presented with lower abdominal pain and constipation. CT scan showed splenic enlargement with heterogeneous enhancement, along with wedge-shaped areas of hypo enhancement. Laboratory work ups done on 10/16/20 showed mild leukocytosis (WBC 14.1) and JAK2 V617F mutation, consistent with JAK2 positive myeloproliferative neoplasm. The rests of the blood test, including flow cytometry, JAK2 exon 12-15, MPL, CALR, SPEP, serum immunofixation, ESR and LDH were within normal range. On January 5, 2021, he presented to me for follow up. I have been following him for JAK2 positive myeloproliferative neoplasm and he required therapeutic phlebotomy since diagnosis. I believe his splenomegaly is due to JAK2 positive myeloproliferative neoplasm.  The areas of wedge-shaped hypoenhancement are due to splenic infarction, secondary to JAK2 related hypercoagulable state. He is currently on Xarelto 20 mg daily and I added aspirin 81 mg daily since 10/30/20 to prevent both arterial and venous thromboembolic episodes. I recommend him to have therapeutic phlebotomy to keep his hematocrit less than 45%. Since his hematocrit today was 48.6, I recommended to have one phlebotomy within a week. I will consider to add hydroxyurea whenever his platelet count goes up more than normal range. He is very high risk to have thromboemobolic episodes. I answered all his questions and concerns for today. I recommend him to follow up with primary care physician, Dr. Hipolito Isbell on regular basis. I will continue to keep you updated on his progress. Thank you for allowing me to participate in the care of this very pleasant gentleman. Recent imaging and labs were reviewed and discussed with the patient.

## 2021-01-05 ENCOUNTER — OFFICE VISIT (OUTPATIENT)
Dept: ONCOLOGY | Age: 76
End: 2021-01-05
Payer: MEDICARE

## 2021-01-05 ENCOUNTER — CLINICAL DOCUMENTATION (OUTPATIENT)
Dept: ONCOLOGY | Age: 76
End: 2021-01-05

## 2021-01-05 ENCOUNTER — HOSPITAL ENCOUNTER (OUTPATIENT)
Dept: INFUSION THERAPY | Age: 76
Discharge: HOME | End: 2021-01-05
Payer: MEDICARE

## 2021-01-05 VITALS
SYSTOLIC BLOOD PRESSURE: 149 MMHG | RESPIRATION RATE: 16 BRPM | HEIGHT: 68 IN | TEMPERATURE: 97.5 F | WEIGHT: 206.8 LBS | HEART RATE: 64 BPM | DIASTOLIC BLOOD PRESSURE: 76 MMHG | BODY MASS INDEX: 31.34 KG/M2 | OXYGEN SATURATION: 95 %

## 2021-01-05 DIAGNOSIS — D47.1 MYELOPROLIFERATIVE NEOPLASM (HCC): ICD-10-CM

## 2021-01-05 DIAGNOSIS — D47.1 MYELOPROLIFERATIVE NEOPLASM (HCC): Primary | ICD-10-CM

## 2021-01-05 LAB
ALBUMIN SERPL-MCNC: 4.2 GM/DL (ref 3.4–5)
ALP BLD-CCNC: 105 IU/L (ref 40–129)
ALT SERPL-CCNC: 20 U/L (ref 10–40)
ANION GAP SERPL CALCULATED.3IONS-SCNC: 8 MMOL/L (ref 4–16)
AST SERPL-CCNC: 23 IU/L (ref 15–37)
BASOPHILS ABSOLUTE: 0.6 K/CU MM
BASOPHILS RELATIVE PERCENT: 4.1 % (ref 0–1)
BILIRUB SERPL-MCNC: 1.7 MG/DL (ref 0–1)
BUN BLDV-MCNC: 9 MG/DL (ref 6–23)
CALCIUM SERPL-MCNC: 9.2 MG/DL (ref 8.3–10.6)
CHLORIDE BLD-SCNC: 99 MMOL/L (ref 99–110)
CO2: 29 MMOL/L (ref 21–32)
CREAT SERPL-MCNC: 1.2 MG/DL (ref 0.9–1.3)
DIFFERENTIAL TYPE: ABNORMAL
EOSINOPHILS ABSOLUTE: 1.1 K/CU MM
EOSINOPHILS RELATIVE PERCENT: 6.8 % (ref 0–3)
ERYTHROCYTE SEDIMENTATION RATE: 0 MM/HR (ref 0–20)
GFR AFRICAN AMERICAN: >60 ML/MIN/1.73M2
GFR NON-AFRICAN AMERICAN: 59 ML/MIN/1.73M2
GLUCOSE BLD-MCNC: 83 MG/DL (ref 70–99)
HCT VFR BLD CALC: 48.6 % (ref 42–52)
HEMOGLOBIN: 15.9 GM/DL (ref 13.5–18)
LACTATE DEHYDROGENASE: 270 IU/L (ref 120–246)
LYMPHOCYTES ABSOLUTE: 1.4 K/CU MM
LYMPHOCYTES RELATIVE PERCENT: 9.3 % (ref 24–44)
MCH RBC QN AUTO: 27.7 PG (ref 27–31)
MCHC RBC AUTO-ENTMCNC: 32.7 % (ref 32–36)
MCV RBC AUTO: 84.5 FL (ref 78–100)
MONOCYTES ABSOLUTE: 0.9 K/CU MM
MONOCYTES RELATIVE PERCENT: 5.6 % (ref 0–4)
PDW BLD-RTO: 16.6 % (ref 11.7–14.9)
PLATELET # BLD: 354 K/CU MM (ref 140–440)
PMV BLD AUTO: 9.9 FL (ref 7.5–11.1)
POTASSIUM SERPL-SCNC: 4.2 MMOL/L (ref 3.5–5.1)
RBC # BLD: 5.75 M/CU MM (ref 4.6–6.2)
SEGMENTED NEUTROPHILS ABSOLUTE COUNT: 11.5 K/CU MM
SEGMENTED NEUTROPHILS RELATIVE PERCENT: 74.2 % (ref 36–66)
SODIUM BLD-SCNC: 136 MMOL/L (ref 135–145)
TOTAL PROTEIN: 6.5 GM/DL (ref 6.4–8.2)
WBC # BLD: 15.5 K/CU MM (ref 4–10.5)

## 2021-01-05 PROCEDURE — G8417 CALC BMI ABV UP PARAM F/U: HCPCS | Performed by: INTERNAL MEDICINE

## 2021-01-05 PROCEDURE — 3017F COLORECTAL CA SCREEN DOC REV: CPT | Performed by: INTERNAL MEDICINE

## 2021-01-05 PROCEDURE — 1123F ACP DISCUSS/DSCN MKR DOCD: CPT | Performed by: INTERNAL MEDICINE

## 2021-01-05 PROCEDURE — 80053 COMPREHEN METABOLIC PANEL: CPT

## 2021-01-05 PROCEDURE — G8427 DOCREV CUR MEDS BY ELIG CLIN: HCPCS | Performed by: INTERNAL MEDICINE

## 2021-01-05 PROCEDURE — 83615 LACTATE (LD) (LDH) ENZYME: CPT

## 2021-01-05 PROCEDURE — 1036F TOBACCO NON-USER: CPT | Performed by: INTERNAL MEDICINE

## 2021-01-05 PROCEDURE — 85652 RBC SED RATE AUTOMATED: CPT

## 2021-01-05 PROCEDURE — 99213 OFFICE O/P EST LOW 20 MIN: CPT | Performed by: INTERNAL MEDICINE

## 2021-01-05 PROCEDURE — G8482 FLU IMMUNIZE ORDER/ADMIN: HCPCS | Performed by: INTERNAL MEDICINE

## 2021-01-05 PROCEDURE — 4040F PNEUMOC VAC/ADMIN/RCVD: CPT | Performed by: INTERNAL MEDICINE

## 2021-01-05 PROCEDURE — 85025 COMPLETE CBC W/AUTO DIFF WBC: CPT

## 2021-01-05 PROCEDURE — 36415 COLL VENOUS BLD VENIPUNCTURE: CPT

## 2021-01-05 PROCEDURE — 99211 OFF/OP EST MAY X REQ PHY/QHP: CPT

## 2021-01-05 ASSESSMENT — PATIENT HEALTH QUESTIONNAIRE - PHQ9
1. LITTLE INTEREST OR PLEASURE IN DOING THINGS: 0
2. FEELING DOWN, DEPRESSED OR HOPELESS: 0

## 2021-01-05 NOTE — PROGRESS NOTES
MA Rooming Questions  Patient: Taisha Rinaldi  MRN: [id number]    Date: 1/5/2021        1. Do you have any new issues?   no         2. Do you need any refills on medications?    no    3. Have you had any imaging done since your last visit?   no    4. Have you been hospitalized or seen in the emergency room since your last visit here?   no    5. Did the patient have a depression screening completed today?  Yes    PHQ-9 Total Score: 0 (1/5/2021 10:38 AM)       PHQ-9 Given to (if applicable):               PHQ-9 Score (if applicable):                     [] Positive     []  Negative              Does question #9 need addressed (if applicable)                     [] Yes    []  No               Zuly Caro MA

## 2021-01-06 ENCOUNTER — HOSPITAL ENCOUNTER (OUTPATIENT)
Dept: INFUSION THERAPY | Age: 76
Setting detail: INFUSION SERIES
Discharge: HOME | End: 2021-01-06
Payer: MEDICARE

## 2021-01-06 VITALS
SYSTOLIC BLOOD PRESSURE: 122 MMHG | HEART RATE: 60 BPM | RESPIRATION RATE: 16 BRPM | TEMPERATURE: 97.7 F | DIASTOLIC BLOOD PRESSURE: 65 MMHG | OXYGEN SATURATION: 95 %

## 2021-01-06 DIAGNOSIS — D47.1 MYELOPROLIFERATIVE NEOPLASM (HCC): Primary | ICD-10-CM

## 2021-01-06 PROCEDURE — 99211 OFF/OP EST MAY X REQ PHY/QHP: CPT

## 2021-01-06 PROCEDURE — 99195 PHLEBOTOMY: CPT

## 2021-01-06 NOTE — PROGRESS NOTES
Diagnosis: Myloproliferative neoplasm    Pre-Phlebotomy: /64, HR 62    Post-Phlebotomy: /64, HR 64    Volume Removed: 174 grams    Complications: none    Comments:  Tolerated phlebotomy well        LOT# [id number]    Exp: 7-2022      [id number] [facility]

## 2021-04-06 ENCOUNTER — HOSPITAL ENCOUNTER (OUTPATIENT)
Dept: INFUSION THERAPY | Age: 76
Discharge: HOME | End: 2021-04-06
Payer: MEDICARE

## 2021-04-06 ENCOUNTER — OFFICE VISIT (OUTPATIENT)
Dept: ONCOLOGY | Age: 76
End: 2021-04-06
Payer: MEDICARE

## 2021-04-06 ENCOUNTER — CLINICAL DOCUMENTATION (OUTPATIENT)
Dept: ONCOLOGY | Age: 76
End: 2021-04-06

## 2021-04-06 VITALS
HEIGHT: 68 IN | HEART RATE: 69 BPM | DIASTOLIC BLOOD PRESSURE: 71 MMHG | OXYGEN SATURATION: 95 % | TEMPERATURE: 97 F | SYSTOLIC BLOOD PRESSURE: 151 MMHG | BODY MASS INDEX: 31.43 KG/M2 | WEIGHT: 207.4 LBS

## 2021-04-06 DIAGNOSIS — D47.1 MYELOPROLIFERATIVE NEOPLASM (HCC): Primary | ICD-10-CM

## 2021-04-06 DIAGNOSIS — D47.1 MYELOPROLIFERATIVE NEOPLASM (HCC): ICD-10-CM

## 2021-04-06 LAB
ALBUMIN SERPL-MCNC: 3.8 GM/DL (ref 3.4–5)
ALP BLD-CCNC: 97 IU/L (ref 40–129)
ALT SERPL-CCNC: 28 U/L (ref 10–40)
ANION GAP SERPL CALCULATED.3IONS-SCNC: 9 MMOL/L (ref 4–16)
AST SERPL-CCNC: 24 IU/L (ref 15–37)
BASOPHILS ABSOLUTE: 0.6 K/CU MM
BASOPHILS RELATIVE PERCENT: 4.3 % (ref 0–1)
BILIRUB SERPL-MCNC: 2 MG/DL (ref 0–1)
BUN BLDV-MCNC: 11 MG/DL (ref 6–23)
CALCIUM SERPL-MCNC: 8.6 MG/DL (ref 8.3–10.6)
CHLORIDE BLD-SCNC: 102 MMOL/L (ref 99–110)
CO2: 28 MMOL/L (ref 21–32)
CREAT SERPL-MCNC: 1 MG/DL (ref 0.9–1.3)
DIFFERENTIAL TYPE: ABNORMAL
EOSINOPHILS ABSOLUTE: 0.9 K/CU MM
EOSINOPHILS RELATIVE PERCENT: 6.4 % (ref 0–3)
ERYTHROCYTE SEDIMENTATION RATE: 3 MM/HR (ref 0–20)
GFR AFRICAN AMERICAN: >60 ML/MIN/1.73M2
GFR NON-AFRICAN AMERICAN: >60 ML/MIN/1.73M2
GLUCOSE BLD-MCNC: 103 MG/DL (ref 70–99)
HCT VFR BLD CALC: 46.4 % (ref 42–52)
HEMOGLOBIN: 15.4 GM/DL (ref 13.5–18)
LACTATE DEHYDROGENASE: 251 IU/L (ref 120–246)
LYMPHOCYTES ABSOLUTE: 1.3 K/CU MM
LYMPHOCYTES RELATIVE PERCENT: 9 % (ref 24–44)
MCH RBC QN AUTO: 26.3 PG (ref 27–31)
MCHC RBC AUTO-ENTMCNC: 33.2 % (ref 32–36)
MCV RBC AUTO: 79.3 FL (ref 78–100)
MONOCYTES ABSOLUTE: 0.9 K/CU MM
MONOCYTES RELATIVE PERCENT: 5.9 % (ref 0–4)
PDW BLD-RTO: 19.2 % (ref 11.7–14.9)
PLATELET # BLD: 322 K/CU MM (ref 140–440)
PMV BLD AUTO: 9.6 FL (ref 7.5–11.1)
POTASSIUM SERPL-SCNC: 3.9 MMOL/L (ref 3.5–5.1)
RBC # BLD: 5.85 M/CU MM (ref 4.6–6.2)
SEGMENTED NEUTROPHILS ABSOLUTE COUNT: 10.9 K/CU MM
SEGMENTED NEUTROPHILS RELATIVE PERCENT: 74.4 % (ref 36–66)
SODIUM BLD-SCNC: 139 MMOL/L (ref 135–145)
TOTAL PROTEIN: 6.1 GM/DL (ref 6.4–8.2)
WBC # BLD: 14.6 K/CU MM (ref 4–10.5)

## 2021-04-06 PROCEDURE — 99213 OFFICE O/P EST LOW 20 MIN: CPT | Performed by: INTERNAL MEDICINE

## 2021-04-06 PROCEDURE — 1036F TOBACCO NON-USER: CPT | Performed by: INTERNAL MEDICINE

## 2021-04-06 PROCEDURE — 83615 LACTATE (LD) (LDH) ENZYME: CPT

## 2021-04-06 PROCEDURE — 85025 COMPLETE CBC W/AUTO DIFF WBC: CPT

## 2021-04-06 PROCEDURE — G8417 CALC BMI ABV UP PARAM F/U: HCPCS | Performed by: INTERNAL MEDICINE

## 2021-04-06 PROCEDURE — 4040F PNEUMOC VAC/ADMIN/RCVD: CPT | Performed by: INTERNAL MEDICINE

## 2021-04-06 PROCEDURE — 3017F COLORECTAL CA SCREEN DOC REV: CPT | Performed by: INTERNAL MEDICINE

## 2021-04-06 PROCEDURE — G8427 DOCREV CUR MEDS BY ELIG CLIN: HCPCS | Performed by: INTERNAL MEDICINE

## 2021-04-06 PROCEDURE — 85652 RBC SED RATE AUTOMATED: CPT

## 2021-04-06 PROCEDURE — 1123F ACP DISCUSS/DSCN MKR DOCD: CPT | Performed by: INTERNAL MEDICINE

## 2021-04-06 PROCEDURE — 80053 COMPREHEN METABOLIC PANEL: CPT

## 2021-04-06 PROCEDURE — 36415 COLL VENOUS BLD VENIPUNCTURE: CPT

## 2021-04-06 PROCEDURE — 99211 OFF/OP EST MAY X REQ PHY/QHP: CPT

## 2021-04-06 NOTE — PROGRESS NOTES
MA Rooming Questions  Patient: Hiral Cervantes  MRN: [id number]    Date: 4/6/2021        1. Do you have any new issues?   no         2. Do you need any refills on medications?    no    3. Have you had any imaging done since your last visit?   no    4. Have you been hospitalized or seen in the emergency room since your last visit here?   no    5. Did the patient have a depression screening completed today?  No    No data recorded     PHQ-9 Given to (if applicable):               PHQ-9 Score (if applicable):                     [] Positive     []  Negative              Does question #9 need addressed (if applicable)                     [] Yes    []  No               Dane Ruiz MA

## 2021-04-06 NOTE — PROGRESS NOTES
Patient Name: Delores Elliott  Patient : 1945  Patient MRN: [id number]     Primary Oncologist: Jean Paul Reddy MD  Referring Provider: Melia Montemayor MD     Date of Service: 2021      Chief Complaint:   Chief Complaint   Patient presents with    Follow-up     Patient Active Problem List:     Recurrent deep venous thrombosis      Splenomegaly     JAK2 positive myeloproliferative neoplasm    HPI:   Delores Elliott is a 66-year-old pleasant gentleman with medical history significant for hypertension, GERD and recurrent VTE, currently on long term anticoagulation therapy with Xarelto, referred to me on 2020 for evaluation of his splenomegaly. He stated that he presented to [facility] on 10/15/20 with lower abdominal pain and constipation. He was found to have impacted stool and he was treated with soapsuds enema. His symptom resolved after he had bowel movement. Because of his history of small bowel obstruction, CT scan was ordered. It showed splenic enlargement with heterogeneous enhancement, along with wedge-shaped areas of hypo enhancement. The enlargement is nonspecific, but can be reactive to inflammation or infection elsewhere. However, neoplasm (especially lymphoma) must also be considered. The areas of wedge-shaped hypoenhancement may be secondary to the reactive process, but superimposed necrosis/infarction is more likely. He was released from hospital with an appointment to see me as an out patient. He has about 35 pound weight loss over one year duration. Laboratory work ups done on 10/16/20 showed mild leukocytosis (WBC 14.1) and JAK2 V617F mutation, consistent with JAK2 positive myeloproliferative neoplasm. The rests of the blood test, including flow cytometry, JAK2 exon 12-15, MPL, CALR, SPEP, serum immunofixation, ESR and LDH were within normal range. On 2021, he presented to me for follow up.   I have been following him for JAK2 positive myeloproliferative neoplasm and he required therapeutic phlebotomy since diagnosis. I believe his splenomegaly is due to JAK2 positive myeloproliferative neoplasm. The areas of wedge-shaped hypoenhancement are due to splenic infarction, secondary to JAK2 related hypercoagulable state. He is currently on Xarelto 20 mg daily and I added aspirin 81 mg daily since 10/30/20 to prevent both arterial and venous thromboembolic episodes. I recommend him to have therapeutic phlebotomy to keep his hematocrit less than 45%. Since his hematocrit today was 46.4, I recommended to have one phlebotomy within a week. I will consider to add hydroxyurea whenever his platelet count goes up more than normal range. He is very high risk to have thromboemobolic episodes. He doesn't have any significant symptoms at today visit. Patient's previous history of VTE  He reported that he hit his left leg to the side of his truck and then developed a LLE DVT in 2014 when he was placed on Coumadin. He was on it for 6 months. It looks like he developed RLE(does not remember any provoking factors) in 2015 when he was placed on coumadin which was switched to Xarelto prior to his PCP retired. Reported that in November 2017 he was off of Xarelto for 7 days for teeth extraction. Developed RLE DVT. Xarelto resumed and and has been very compliant with it since. On March 3, 2019, he presented with right calf pain and swelling. Doppler showed not compressible in the region of popliteal vein and distal femoral vein which suggests DVT. He was switched to heparin, transition to coumadin. Later on, he was switched back to xarelto since he has difficulty achiever continuous therapeutic INR. (also wasn't sure doppler done on 3/2019 showed chronic or acute DVT). He has been on xarelto since then.      Hypercoagulable work ups done on 3/10/2019 showed anticardiolipin antibodies - negative, beta-2 glycoprotein antibodies - negative, protein C - 119, protein S - 75, factor V Leiden - negative, prothrombin gene mutation - negative, homocysteine - 19.1, MTHFR - heterozygous for [id number]. Past Medical History  History of DVT  Hypertension  Herpes zoster  GERD    Surgical History  Cholecystectomy   Laparoscopic appendectomy  Mandible fracture surgery    Allergies  allopurinol    Social History  Retired 12 years ago, worked at [employer]. No tob/etoh or any other illicit drug abuse. Family History  Reported that brother had a history of malignancy but was not sure what kind of malignancy. Review of Systems: \"Per interval history; otherwise 10 point ROS is negative. \"  His energy level is pretty good, appetite, and sleep are fine. He doesn't have fever, chills, night sweats, cough, shortness of breath, chest pain, hemoptysis, or palpitations. His bowel and bladder functions are malia. He denies nausea, vomiting, abdominal pain, diarrhea, constipation, dysuria, loss of appetite, or weight loss. He doesn't have neuropathy and he denies bleeding or clotting issues. He denies any pain on today visit. No anxiety or depression. The rest of the systems are unremarkable.      Vital Signs:  BP (!) 151/71 (Site: Right Upper Arm, Position: Sitting, Cuff Size: Medium Adult)   Pulse 69   Temp 97 °F (36.1 °C) (Temporal)   Ht 5' 8\" (1.727 m)   Wt 207 lb 6.4 oz (94.1 kg)   SpO2 95%   BMI 31.54 kg/m²     Physical Exam:  CONSTITUTIONAL: awake, no apparent distress, alert, cooperative,    EYES: pupils equal, sclera clear and conjunctiva normal, round and reactive to light,   ENT: without obvious abnormality, normocephalic, atraumatic  NECK: supple, symmetrical, no jugular venous distension and no carotid bruits   HEMATOLOGIC/LYMPHATIC: no cervical, supraclavicular or axillary lymphadenopathy   LUNGS: VBS, no wheezes, no increased work of breathing, no rhonchi, clear to auscultation, no crackles,    CARDIOVASCULAR: normal S1 and S2, regular rate and rhythm, no murmur noted  ABDOMEN: soft, non-distended, non-tender, normal bowel sounds x 4, no masses palpated, no hepatosplenomegaly   MUSCULOSKELETAL: full range of motion noted, tone is normal  NEUROLOGIC: awake, alert, oriented to name, place and time. Motor skills grossly intact. SKIN: Normal skin color, texture, turgor and no jaundice.  appears intact   EXTREMITIES: no LE edema, no leg swelling, no cyanosis, no clubbing     Labs:  Hematology:  Lab Results   Component Value Date    WBC 14.6 (H) 04/06/2021    RBC 5.85 04/06/2021    HGB 15.4 04/06/2021    HCT 46.4 04/06/2021    MCV 79.3 04/06/2021    MCH 26.3 (L) 04/06/2021    MCHC 33.2 04/06/2021    RDW 19.2 (H) 04/06/2021     04/06/2021    MPV 9.6 04/06/2021    BANDSPCT 3 (L) 08/11/2020    SEGSPCT 74.4 (H) 04/06/2021    EOSRELPCT 6.4 (H) 04/06/2021    BASOPCT 4.3 (H) 04/06/2021    LYMPHOPCT 9.0 (L) 04/06/2021    MONOPCT 5.9 (H) 04/06/2021    BANDABS 0.50 08/11/2020    SEGSABS 10.9 04/06/2021    EOSABS 0.9 04/06/2021    BASOSABS 0.6 04/06/2021    LYMPHSABS 1.3 04/06/2021    MONOSABS 0.9 04/06/2021    DIFFTYPE AUTOMATED DIFFERENTIAL 04/06/2021    PLTM SEVERAL LARGE PLATELETS 43/70/9441     Lab Results   Component Value Date    ESR 3 04/06/2021     Chemistry:  Lab Results   Component Value Date     04/06/2021    K 3.9 04/06/2021     04/06/2021    CO2 28 04/06/2021    BUN 11 04/06/2021    CREATININE 1.0 04/06/2021    GLUCOSE 103 (H) 04/06/2021    CALCIUM 8.6 04/06/2021    PROT 6.1 (L) 04/06/2021    LABALBU 3.8 04/06/2021    BILITOT 2.0 (H) 04/06/2021    ALKPHOS 97 04/06/2021    AST 24 04/06/2021    ALT 28 04/06/2021    LABGLOM >60 04/06/2021    GFRAA >60 04/06/2021    MG 2.2 08/10/2020    POCGLU 124 (H) 06/02/2017     Lab Results   Component Value Date     (H) 04/06/2021    HOMOCYSTEINE 19.1 (H) 03/10/2019     No components found for: LD  Lab Results   Component Value Date    TSHHS 0.591 07/12/2018 Immunology:  Lab Results   Component Value Date    PROT 6.1 (L) 04/06/2021    SPEP  10/16/2020     INTERPRETATION - No monoclonal proteins identified. SAF    ALBUMINELP 3.4 10/16/2020    LABALPH 0.3 10/16/2020    LABALPH 0.7 10/16/2020    LABBETA 0.9 10/16/2020    GAMGLOB 0.9 10/16/2020     Lab Results   Component Value Date    KAPPAUVOL 28.82 (H) 10/16/2020    LAMBDAUVOL 19.74 10/16/2020    KLFLCR 1.46 10/16/2020     Lab Results   Component Value Date    B2M 4.1 (H) 10/16/2020     Coagulation Panel:  Lab Results   Component Value Date    PROTIME 16.2 (H) 08/10/2020    INR 1.33 08/10/2020    APTT 36.4 08/10/2020    DDIMER 837 (H) 08/10/2020     Anemia Panel:  No results found for: [id number], FOLATE  Tumor Markers:  No results found for: , CEA, , LABCA2, PSA  Observations:  No data recorded     Assessment & Plan:   Recurrent VTE  Splenomegaly  JAK2 V617F myeloproliferative neoplasm    PLAN  Grayson Bowser is a 80-year-old pleasant gentleman with medical history significant for recurrent VTE, who was found to have splenomegaly when he presented with lower abdominal pain and constipation. CT scan showed splenic enlargement with heterogeneous enhancement, along with wedge-shaped areas of hypo enhancement. Laboratory work ups done on 10/16/20 showed mild leukocytosis (WBC 14.1) and JAK2 V617F mutation, consistent with JAK2 positive myeloproliferative neoplasm. The rests of the blood test, including flow cytometry, JAK2 exon 12-15, MPL, CALR, SPEP, serum immunofixation, ESR and LDH were within normal range. On April 6, 2021, he presented to me for follow up. I have been following him for JAK2 positive myeloproliferative neoplasm and he required therapeutic phlebotomy since diagnosis. I believe his splenomegaly is due to JAK2 positive myeloproliferative neoplasm.  The areas of wedge-shaped hypoenhancement are due to splenic infarction, secondary to JAK2 related hypercoagulable state. He is currently on Xarelto 20 mg daily and I added aspirin 81 mg daily since 10/30/20 to prevent both arterial and venous thromboembolic episodes. I recommend him to have therapeutic phlebotomy to keep his hematocrit less than 45%. Since his hematocrit today was 46.4, I recommended to have one phlebotomy within a week. I will consider to add hydroxyurea whenever his platelet count goes up more than normal range. He is very high risk to have thromboemobolic episodes. I answered all his questions and concerns for today. I recommend him to follow up with primary care physician, Dr. Iglesia Greenwood on regular basis. Recent imaging and labs were reviewed and discussed with the patient.

## 2021-04-15 ENCOUNTER — HOSPITAL ENCOUNTER (OUTPATIENT)
Dept: INFUSION THERAPY | Age: 76
Setting detail: INFUSION SERIES
Discharge: HOME | End: 2021-04-15
Payer: MEDICARE

## 2021-04-15 VITALS
HEART RATE: 63 BPM | OXYGEN SATURATION: 96 % | SYSTOLIC BLOOD PRESSURE: 122 MMHG | RESPIRATION RATE: 16 BRPM | TEMPERATURE: 97.3 F | DIASTOLIC BLOOD PRESSURE: 68 MMHG

## 2021-04-15 DIAGNOSIS — D47.1 MYELOPROLIFERATIVE NEOPLASM (HCC): Primary | ICD-10-CM

## 2021-04-15 PROCEDURE — 99211 OFF/OP EST MAY X REQ PHY/QHP: CPT

## 2021-04-15 PROCEDURE — 99195 PHLEBOTOMY: CPT

## 2021-04-15 NOTE — PROGRESS NOTES
Diagnosis: Myloproliferative neoplasm    Pre-Phlebotomy: /67, HR 62    Post-Phlebotomy: /70, HR 68    Volume Removed: 387 grams    Complications: None    Comments:  Tolerated phlebotomy well        LOT# [id number]    Exp: 7-2022          [address] [facility],[address]

## 2021-07-06 ENCOUNTER — OFFICE VISIT (OUTPATIENT)
Dept: ONCOLOGY | Age: 76
End: 2021-07-06
Payer: MEDICARE

## 2021-07-06 ENCOUNTER — HOSPITAL ENCOUNTER (OUTPATIENT)
Dept: INFUSION THERAPY | Age: 76
Discharge: HOME | End: 2021-07-06
Payer: MEDICARE

## 2021-07-06 VITALS
WEIGHT: 207 LBS | HEIGHT: 68 IN | DIASTOLIC BLOOD PRESSURE: 67 MMHG | RESPIRATION RATE: 18 BRPM | TEMPERATURE: 98.5 F | OXYGEN SATURATION: 94 % | SYSTOLIC BLOOD PRESSURE: 144 MMHG | HEART RATE: 67 BPM | BODY MASS INDEX: 31.37 KG/M2

## 2021-07-06 DIAGNOSIS — D47.1 MYELOPROLIFERATIVE NEOPLASM (HCC): Primary | ICD-10-CM

## 2021-07-06 DIAGNOSIS — D47.1 MYELOPROLIFERATIVE NEOPLASM (HCC): ICD-10-CM

## 2021-07-06 LAB
ALBUMIN SERPL-MCNC: 4.6 GM/DL (ref 3.4–5)
ALP BLD-CCNC: 108 IU/L (ref 40–129)
ALT SERPL-CCNC: 34 U/L (ref 10–40)
ANION GAP SERPL CALCULATED.3IONS-SCNC: 11 MMOL/L (ref 4–16)
AST SERPL-CCNC: 31 IU/L (ref 15–37)
BASOPHILS ABSOLUTE: 0.6 K/CU MM
BASOPHILS RELATIVE PERCENT: 3.9 % (ref 0–1)
BILIRUB SERPL-MCNC: 1.4 MG/DL (ref 0–1)
BUN BLDV-MCNC: 14 MG/DL (ref 6–23)
CALCIUM SERPL-MCNC: 9.3 MG/DL (ref 8.3–10.6)
CHLORIDE BLD-SCNC: 103 MMOL/L (ref 99–110)
CO2: 27 MMOL/L (ref 21–32)
CREAT SERPL-MCNC: 1.1 MG/DL (ref 0.9–1.3)
DIFFERENTIAL TYPE: ABNORMAL
EOSINOPHILS ABSOLUTE: 1 K/CU MM
EOSINOPHILS RELATIVE PERCENT: 6.4 % (ref 0–3)
ERYTHROCYTE SEDIMENTATION RATE: 1 MM/HR (ref 0–20)
GFR AFRICAN AMERICAN: >60 ML/MIN/1.73M2
GFR NON-AFRICAN AMERICAN: >60 ML/MIN/1.73M2
GLUCOSE BLD-MCNC: 129 MG/DL (ref 70–99)
HCT VFR BLD CALC: 45.5 % (ref 42–52)
HEMOGLOBIN: 14.6 GM/DL (ref 13.5–18)
LACTATE DEHYDROGENASE: 264 IU/L (ref 120–246)
LYMPHOCYTES ABSOLUTE: 1.1 K/CU MM
LYMPHOCYTES RELATIVE PERCENT: 7.2 % (ref 24–44)
MCH RBC QN AUTO: 25.7 PG (ref 27–31)
MCHC RBC AUTO-ENTMCNC: 32.1 % (ref 32–36)
MCV RBC AUTO: 80.1 FL (ref 78–100)
MONOCYTES ABSOLUTE: 1 K/CU MM
MONOCYTES RELATIVE PERCENT: 6.4 % (ref 0–4)
PDW BLD-RTO: 17.8 % (ref 11.7–14.9)
PLATELET # BLD: 375 K/CU MM (ref 140–440)
PMV BLD AUTO: 10.1 FL (ref 7.5–11.1)
POTASSIUM SERPL-SCNC: 3.7 MMOL/L (ref 3.5–5.1)
RBC # BLD: 5.68 M/CU MM (ref 4.6–6.2)
SEGMENTED NEUTROPHILS ABSOLUTE COUNT: 11.6 K/CU MM
SEGMENTED NEUTROPHILS RELATIVE PERCENT: 76.1 % (ref 36–66)
SODIUM BLD-SCNC: 141 MMOL/L (ref 135–145)
TOTAL PROTEIN: 6.3 GM/DL (ref 6.4–8.2)
WBC # BLD: 15.2 K/CU MM (ref 4–10.5)

## 2021-07-06 PROCEDURE — 1036F TOBACCO NON-USER: CPT | Performed by: INTERNAL MEDICINE

## 2021-07-06 PROCEDURE — 36415 COLL VENOUS BLD VENIPUNCTURE: CPT

## 2021-07-06 PROCEDURE — 85025 COMPLETE CBC W/AUTO DIFF WBC: CPT

## 2021-07-06 PROCEDURE — 85652 RBC SED RATE AUTOMATED: CPT

## 2021-07-06 PROCEDURE — 99213 OFFICE O/P EST LOW 20 MIN: CPT | Performed by: INTERNAL MEDICINE

## 2021-07-06 PROCEDURE — 3017F COLORECTAL CA SCREEN DOC REV: CPT | Performed by: INTERNAL MEDICINE

## 2021-07-06 PROCEDURE — 83615 LACTATE (LD) (LDH) ENZYME: CPT

## 2021-07-06 PROCEDURE — 1123F ACP DISCUSS/DSCN MKR DOCD: CPT | Performed by: INTERNAL MEDICINE

## 2021-07-06 PROCEDURE — 99211 OFF/OP EST MAY X REQ PHY/QHP: CPT

## 2021-07-06 PROCEDURE — G8427 DOCREV CUR MEDS BY ELIG CLIN: HCPCS | Performed by: INTERNAL MEDICINE

## 2021-07-06 PROCEDURE — G8417 CALC BMI ABV UP PARAM F/U: HCPCS | Performed by: INTERNAL MEDICINE

## 2021-07-06 PROCEDURE — 4040F PNEUMOC VAC/ADMIN/RCVD: CPT | Performed by: INTERNAL MEDICINE

## 2021-07-06 PROCEDURE — 80053 COMPREHEN METABOLIC PANEL: CPT

## 2021-07-06 RX ORDER — PRAMIPEXOLE DIHYDROCHLORIDE 1 MG/1
TABLET ORAL
COMMUNITY
Start: 2021-07-01 | End: 2022-01-06

## 2021-07-06 NOTE — PROGRESS NOTES
Patient Name: Colonel Walters  Patient : 1945  Patient MRN: [id number]     Primary Oncologist: Cameron Conde MD  Referring Provider: Fredrick Walls MD     Date of Service: 2021      Chief Complaint:   Chief Complaint   Patient presents with    Follow-up     Patient Active Problem List:     Recurrent deep venous thrombosis      Splenomegaly     JAK2 positive myeloproliferative neoplasm    HPI:   Colonel Walters is a 20-year-old pleasant gentleman with medical history significant for hypertension, GERD and recurrent VTE, currently on long term anticoagulation therapy with Xarelto, referred to me on 2020 for evaluation of his splenomegaly. He stated that he presented to [facility] on 10/15/20 with lower abdominal pain and constipation. He was found to have impacted stool and he was treated with soapsuds enema. His symptom resolved after he had bowel movement. Because of his history of small bowel obstruction, CT scan was ordered. It showed splenic enlargement with heterogeneous enhancement, along with wedge-shaped areas of hypo enhancement. The enlargement is nonspecific, but can be reactive to inflammation or infection elsewhere. However, neoplasm (especially lymphoma) must also be considered. The areas of wedge-shaped hypoenhancement may be secondary to the reactive process, but superimposed necrosis/infarction is more likely. He was released from hospital with an appointment to see me as an out patient. He has about 35 pound weight loss over one year duration. Laboratory work ups done on 10/16/20 showed mild leukocytosis (WBC 14.1) and JAK2 V617F mutation, consistent with JAK2 positive myeloproliferative neoplasm. The rests of the blood test, including flow cytometry, JAK2 exon 12-15, MPL, CALR, SPEP, serum immunofixation, ESR and LDH were within normal range. On 2021, he presented to me for follow up.   I have been following him for JAK2 positive myeloproliferative neoplasm and he required therapeutic phlebotomy since diagnosis. I believe his splenomegaly is due to JAK2 positive myeloproliferative neoplasm. The areas of wedge-shaped hypoenhancement are due to splenic infarction, secondary to JAK2 related hypercoagulable state. He is currently on Xarelto 20 mg daily and I added aspirin 81 mg daily since 10/30/20 to prevent both arterial and venous thromboembolic episodes. I recommend him to have therapeutic phlebotomy to keep his hematocrit less than 45%. Since his hematocrit today was 45.5, I recommended to have one phlebotomy within a week. I will consider to add hydroxyurea whenever his platelet count goes up more than normal range. He is very high risk to have thromboemobolic episodes. Health maintenance - I recommend exercise, low fat and low sodium diet. He doesn't have any significant symptoms at today visit. Patient's previous history of VTE  He reported that he hit his left leg to the side of his truck and then developed a LLE DVT in 2014 when he was placed on Coumadin. He was on it for 6 months. It looks like he developed RLE(does not remember any provoking factors) in 2015 when he was placed on coumadin which was switched to Xarelto prior to his PCP retired. Reported that in November 2017 he was off of Xarelto for 7 days for teeth extraction. Developed RLE DVT. Xarelto resumed and and has been very compliant with it since. On March 3, 2019, he presented with right calf pain and swelling. Doppler showed not compressible in the region of popliteal vein and distal femoral vein which suggests DVT. He was switched to heparin, transition to coumadin. Later on, he was switched back to xarelto since he has difficulty achiever continuous therapeutic INR. (also wasn't sure doppler done on 3/2019 showed chronic or acute DVT). He has been on xarelto since then.      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 Past Medical History  History of DVT  Hypertension  Herpes zoster  GERD    Surgical History  Cholecystectomy   Laparoscopic appendectomy  Mandible fracture surgery    Allergies  allopurinol    Social History  Retired 12 years ago, worked at [employer]. No tob/etoh or any other illicit drug abuse. Family History  Reported that brother had a history of malignancy but was not sure what kind of malignancy. Review of Systems: \"Per interval history; otherwise 10 point ROS is negative. \"  His energy level is fair, appetite, and sleep are good. He denies fever, chills, night sweats, cough, shortness of breath, chest pain, hemoptysis, or palpitations. His bowel and bladder functions are malia. He doesn't have nausea, vomiting, abdominal pain, diarrhea, constipation, dysuria, loss of appetite, or weight loss. He denies neuropathy and he doesn't have bleeding or clotting issues. He doesn't have any pain on today visit. Denies anxiety or depression. The rest of the systems are unremarkable.      Vital Signs:  BP (!) 144/67 (Site: Right Upper Arm, Position: Sitting, Cuff Size: Medium Adult)   Pulse 67   Temp 98.5 °F (36.9 °C) (Temporal)   Resp 18   Ht 5' 8\" (1.727 m)   Wt 207 lb (93.9 kg)   SpO2 94%   BMI 31.47 kg/m²     Physical Exam:  CONSTITUTIONAL: awake, no apparent distress, alert, cooperative,    EYES: pupils equal, sclera clear and conjunctiva normal, round and reactive to light,   ENT: without obvious abnormality, normocephalic, atraumatic  NECK: supple, symmetrical, no jugular venous distension and no carotid bruits   HEMATOLOGIC/LYMPHATIC: no cervical, supraclavicular or axillary lymphadenopathy   LUNGS: VBS, no rhonchi, clear to auscultation, no wheezes, no increased work of breathing, no crackles,    CARDIOVASCULAR: normal S1 and S2, regular rate and rhythm, no murmur noted  ABDOMEN: soft, non-distended, non-tender, normal bowel sounds x 4, no masses palpated, no hepatosplenomegaly   MUSCULOSKELETAL: full range of motion noted, tone is normal  NEUROLOGIC: awake, alert, oriented to name, place and time. Motor skills grossly intact. SKIN: Normal skin color, texture, turgor and no jaundice.  appears intact   EXTREMITIES: no leg swelling, no cyanosis, no LE edema, no clubbing     Labs:  Hematology:  Lab Results   Component Value Date    WBC 15.2 (H) 07/06/2021    RBC 5.68 07/06/2021    HGB 14.6 07/06/2021    HCT 45.5 07/06/2021    MCV 80.1 07/06/2021    MCH 25.7 (L) 07/06/2021    MCHC 32.1 07/06/2021    RDW 17.8 (H) 07/06/2021     07/06/2021    MPV 10.1 07/06/2021    BANDSPCT 3 (L) 08/11/2020    SEGSPCT 76.1 (H) 07/06/2021    EOSRELPCT 6.4 (H) 07/06/2021    BASOPCT 3.9 (H) 07/06/2021    LYMPHOPCT 7.2 (L) 07/06/2021    MONOPCT 6.4 (H) 07/06/2021    BANDABS 0.50 08/11/2020    SEGSABS 11.6 07/06/2021    EOSABS 1.0 07/06/2021    BASOSABS 0.6 07/06/2021    LYMPHSABS 1.1 07/06/2021    MONOSABS 1.0 07/06/2021    DIFFTYPE AUTOMATED DIFFERENTIAL 07/06/2021    PLTM SEVERAL LARGE PLATELETS 21/23/2232     Lab Results   Component Value Date    ESR 1 07/06/2021     Chemistry:  Lab Results   Component Value Date     07/06/2021    K 3.7 07/06/2021     07/06/2021    CO2 27 07/06/2021    BUN 14 07/06/2021    CREATININE 1.1 07/06/2021    GLUCOSE 129 (H) 07/06/2021    CALCIUM 9.3 07/06/2021    PROT 6.3 (L) 07/06/2021    LABALBU 4.6 07/06/2021    BILITOT 1.4 (H) 07/06/2021    ALKPHOS 108 07/06/2021    AST 31 07/06/2021    ALT 34 07/06/2021    LABGLOM >60 07/06/2021    GFRAA >60 07/06/2021    MG 2.2 08/10/2020    POCGLU 124 (H) 06/02/2017     Lab Results   Component Value Date     (H) 07/06/2021    HOMOCYSTEINE 19.1 (H) 03/10/2019     No components found for: LD  Lab Results   Component Value Date    TSHHS 0.591 07/12/2018     Immunology:  Lab Results   Component Value Date    PROT 6.3 (L) 07/06/2021    SPEP  10/16/2020     INTERPRETATION - No monoclonal proteins identified. SAF    ALBUMINELP 3.4 10/16/2020    LABALPH 0.3 10/16/2020    LABALPH 0.7 10/16/2020    LABBETA 0.9 10/16/2020    GAMGLOB 0.9 10/16/2020     Lab Results   Component Value Date    KAPPAUVOL 28.82 (H) 10/16/2020    LAMBDAUVOL 19.74 10/16/2020    KLFLCR 1.46 10/16/2020     Lab Results   Component Value Date    B2M 4.1 (H) 10/16/2020     Coagulation Panel:  Lab Results   Component Value Date    PROTIME 16.2 (H) 08/10/2020    INR 1.33 08/10/2020    APTT 36.4 08/10/2020    DDIMER 837 (H) 08/10/2020     Anemia Panel:  No results found for: [id number], FOLATE  Tumor Markers:  No results found for: , CEA, , LABCA2, PSA  Observations:  No data recorded     Assessment & Plan:   Recurrent VTE  Splenomegaly  JAK2 V617F myeloproliferative neoplasm    PLAN  Inna Rodrigues is a 59-year-old pleasant gentleman with medical history significant for recurrent VTE, who was found to have splenomegaly when he presented with lower abdominal pain and constipation. CT scan showed splenic enlargement with heterogeneous enhancement, along with wedge-shaped areas of hypo enhancement. Laboratory work ups done on 10/16/20 showed mild leukocytosis (WBC 14.1) and JAK2 V617F mutation, consistent with JAK2 positive myeloproliferative neoplasm. The rests of the blood test, including flow cytometry, JAK2 exon 12-15, MPL, CALR, SPEP, serum immunofixation, ESR and LDH were within normal range. On July 6, 2021, he presented to me for follow up. I have been following him for JAK2 positive myeloproliferative neoplasm and he required therapeutic phlebotomy since diagnosis. I believe his splenomegaly is due to JAK2 positive myeloproliferative neoplasm.  The areas of wedge-shaped hypoenhancement are due to splenic infarction, secondary to JAK2 related hypercoagulable state. He is currently on Xarelto 20 mg daily and I added aspirin 81 mg daily since 10/30/20 to prevent both arterial and venous thromboembolic episodes. I recommend him to have therapeutic phlebotomy to keep his hematocrit less than 45%. Since his hematocrit today was 45.5, I recommended to have one phlebotomy within a week. I will consider to add hydroxyurea whenever his platelet count goes up more than normal range. He is very high risk to have thromboemobolic episodes. Health maintenance - I recommend exercise, low fat and low sodium diet. I answered all his questions and concerns for today. I recommend him to follow up with primary care physician, Dr. Jens Sahu on regular basis. Recent imaging and labs were reviewed and discussed with the patient.

## 2021-07-06 NOTE — PROGRESS NOTES
MA Rooming Questions  Patient: Bianca Martinez  MRN: [id number]    Date: 7/6/2021        1. Do you have any new issues?   no         2. Do you need any refills on medications?    no    3. Have you had any imaging done since your last visit?   no    4. Have you been hospitalized or seen in the emergency room since your last visit here?   no    5. Did the patient have a depression screening completed today?  No    No data recorded     PHQ-9 Given to (if applicable):               PHQ-9 Score (if applicable):                     [] Positive     []  Negative              Does question #9 need addressed (if applicable)                     [] Yes    []  No               Libby Sensing, CMA

## 2021-07-12 ENCOUNTER — CLINICAL DOCUMENTATION (OUTPATIENT)
Dept: ONCOLOGY | Age: 76
End: 2021-07-12

## 2021-07-16 ENCOUNTER — HOSPITAL ENCOUNTER (OUTPATIENT)
Dept: INFUSION THERAPY | Age: 76
Setting detail: INFUSION SERIES
Discharge: HOME | End: 2021-07-16
Payer: MEDICARE

## 2021-07-16 VITALS
SYSTOLIC BLOOD PRESSURE: 117 MMHG | RESPIRATION RATE: 16 BRPM | OXYGEN SATURATION: 97 % | HEART RATE: 58 BPM | DIASTOLIC BLOOD PRESSURE: 61 MMHG | TEMPERATURE: 97.3 F

## 2021-07-16 DIAGNOSIS — D47.1 MYELOPROLIFERATIVE NEOPLASM (HCC): Primary | ICD-10-CM

## 2021-07-16 PROCEDURE — 99195 PHLEBOTOMY: CPT

## 2021-07-16 PROCEDURE — 99211 OFF/OP EST MAY X REQ PHY/QHP: CPT

## 2021-07-16 NOTE — DISCHARGE SUMMARY
Tolerated Phlebotomy well. Reviewed discharge instructions, understanding verbalized. Copies of AVS given to take home. Patient discharged home. Down to exit per self. No orders of the defined types were placed in this encounter.

## 2021-07-16 NOTE — PROCEDURES
LOT# [id number]    Exp: 5-23  Pre-phlebotomy:  Pulse:  56   Blood Pressure:  127/61    Post-phlebotomy:  Pulse:  58   Blood Pressure:  117/61    Volume Removed:  886 grams    Complications:  None    Comments:   Tolerated well

## 2021-07-16 NOTE — PROGRESS NOTES
Diagnosis: Myloproliferative neoplasma    Pre-Phlebotomy: BP /61   Pulse 56   Temp 97.3 °F (36.3 °C) (Infrared)   Resp 16   SpO2 97%       Post-Phlebotomy: BP ***/***, HR ***    Volume Removed: *** grams per VICENTE Gamino RN    Complications: none    Comments: tolerated well            LOT# [id number]    Exp: 5-23      Karen Mejia RN

## 2021-10-03 NOTE — PROGRESS NOTES
myeloproliferative neoplasm and he required therapeutic phlebotomy since diagnosis. I believe his splenomegaly is due to JAK2 positive myeloproliferative neoplasm. The areas of wedge-shaped hypoenhancement are due to splenic infarction, secondary to JAK2 related hypercoagulable state. He is currently on Xarelto 20 mg daily and I added aspirin 81 mg daily since 10/30/20 to prevent both arterial and venous thromboembolic episodes. I recommend him to have therapeutic phlebotomy to keep his hematocrit less than 45%. Since his hematocrit today was less than 45%, he doesn't need phlebotomy this time. 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 He is high risk for thromboembolic episodes. After reviewing risks and benefits, he is in agreement to start hydroxyurea today. Health maintenance - I recommend exercise, low fat and low sodium diet. He doesn't have any significant symptoms at today visit. Patient's previous history of VTE  He reported that he hit his left leg to the side of his truck and then developed a LLE DVT in 2014 when he was placed on Coumadin. He was on it for 6 months. It looks like he developed RLE(does not remember any provoking factors) in 2015 when he was placed on coumadin which was switched to Xarelto prior to his PCP retired. Reported that in November 2017 he was off of Xarelto for 7 days for teeth extraction. Developed RLE DVT. Xarelto resumed and and has been very compliant with it since. On March 3, 2019, he presented with right calf pain and swelling. Doppler showed not compressible in the region of popliteal vein and distal femoral vein which suggests DVT. He was switched to heparin, transition to coumadin. Later on, he was switched back to xarelto since he has difficulty achiever continuous therapeutic INR.  (also wasn't sure doppler done on 3/2019 showed chronic or acute DVT). He has been on xarelto since then. Hypercoagulable work ups done on 3/10/2019 showed anticardiolipin antibodies - negative, beta-2 glycoprotein antibodies - negative, protein C - 119, protein S - 75, factor V Leiden - negative, prothrombin gene mutation - negative, homocysteine - 19.1, MTHFR - heterozygous for [id number]. Past Medical History  History of DVT  Hypertension  Herpes zoster  GERD    Surgical History  Cholecystectomy   Laparoscopic appendectomy  Mandible fracture surgery    Allergies  allopurinol    Social History  Retired 12 years ago, worked at [employer]. No tob/etoh or any other illicit drug abuse. Family History  Reported that brother had a history of malignancy but was not sure what kind of malignancy. Review of Systems: \"Per interval history; otherwise 10 point ROS is negative. \"  His energy level is stable, appetite, and sleep are pretty good. He doesn't have fever, chills, night sweats, cough, shortness of breath, chest pain, hemoptysis, or palpitations. His bowel and bladder functions are malia. He denies nausea, vomiting, abdominal pain, diarrhea, constipation, dysuria, loss of appetite, or weight loss. He doesn't have neuropathy and he denies bleeding or clotting issues. He denies any pain on today visit. No anxiety or depression. The rest of the systems are unremarkable. Vital Signs: There were no vitals taken for this visit.     Physical Exam:  CONSTITUTIONAL: awake, no apparent distress, alert, cooperative,    EYES: pupils equal, sclera clear and conjunctiva normal, round and reactive to light,   ENT: without obvious abnormality, normocephalic, atraumatic  NECK: supple, symmetrical, no jugular venous distension and no carotid bruits   HEMATOLOGIC/LYMPHATIC: no cervical, supraclavicular or axillary lymphadenopathy   LUNGS: VBS, no rhonchi, no increased work of breathing, no crackles, clear to auscultation, no wheezes,     CARDIOVASCULAR: normal S1 and S2, regular rate and rhythm, no murmur noted  ABDOMEN: soft, non-distended, non-tender, normal bowel sounds x 4, no masses palpated, no hepatosplenomegaly   MUSCULOSKELETAL: full range of motion noted, tone is normal  NEUROLOGIC: awake, alert, oriented to name, place and time. Motor skills grossly intact. SKIN: Normal skin color, texture, turgor and no jaundice.  appears intact   EXTREMITIES: no leg swelling, no clubbing, no cyanosis, no LE edema,      Labs:  Hematology:  Lab Results   Component Value Date    WBC 15.2 (H) 07/06/2021    RBC 5.68 07/06/2021    HGB 14.6 07/06/2021    HCT 45.5 07/06/2021    MCV 80.1 07/06/2021    MCH 25.7 (L) 07/06/2021    MCHC 32.1 07/06/2021    RDW 17.8 (H) 07/06/2021     07/06/2021    MPV 10.1 07/06/2021    BANDSPCT 3 (L) 08/11/2020    SEGSPCT 76.1 (H) 07/06/2021    EOSRELPCT 6.4 (H) 07/06/2021    BASOPCT 3.9 (H) 07/06/2021    LYMPHOPCT 7.2 (L) 07/06/2021    MONOPCT 6.4 (H) 07/06/2021    BANDABS 0.50 08/11/2020    SEGSABS 11.6 07/06/2021    EOSABS 1.0 07/06/2021    BASOSABS 0.6 07/06/2021    LYMPHSABS 1.1 07/06/2021    MONOSABS 1.0 07/06/2021    DIFFTYPE AUTOMATED DIFFERENTIAL 07/06/2021    PLTM SEVERAL LARGE PLATELETS 70/39/7563     Lab Results   Component Value Date    ESR 1 07/06/2021     Chemistry:  Lab Results   Component Value Date     07/06/2021    K 3.7 07/06/2021     07/06/2021    CO2 27 07/06/2021    BUN 14 07/06/2021    CREATININE 1.1 07/06/2021    GLUCOSE 129 (H) 07/06/2021    CALCIUM 9.3 07/06/2021    PROT 6.3 (L) 07/06/2021    LABALBU 4.6 07/06/2021    BILITOT 1.4 (H) 07/06/2021    ALKPHOS 108 07/06/2021    AST 31 07/06/2021    ALT 34 07/06/2021    LABGLOM >60 07/06/2021    GFRAA >60 07/06/2021    MG 2.2 08/10/2020    POCGLU 124 (H) 06/02/2017     Lab Results   Component Value Date     (H) 07/06/2021    HOMOCYSTEINE 19.1 (H) 03/10/2019     No components found for: LD  Lab Results   Component Value Date    TSHHS 0.591 07/12/2018 Immunology:  Lab Results   Component Value Date    PROT 6.3 (L) 07/06/2021    SPEP  10/16/2020     INTERPRETATION - No monoclonal proteins identified. SAF    ALBUMINELP 3.4 10/16/2020    LABALPH 0.3 10/16/2020    LABALPH 0.7 10/16/2020    LABBETA 0.9 10/16/2020    GAMGLOB 0.9 10/16/2020     Lab Results   Component Value Date    KAPPAUVOL 28.82 (H) 10/16/2020    LAMBDAUVOL 19.74 10/16/2020    KLFLCR 1.46 10/16/2020     Lab Results   Component Value Date    B2M 4.1 (H) 10/16/2020     Coagulation Panel:  Lab Results   Component Value Date    PROTIME 16.2 (H) 08/10/2020    INR 1.33 08/10/2020    APTT 36.4 08/10/2020    DDIMER 837 (H) 08/10/2020     Anemia Panel:  No results found for: [id number], FOLATE  Tumor Markers:  No results found for: , CEA, , LABCA2, PSA  Observations:  No data recorded     Assessment & Plan:   Recurrent VTE  Splenomegaly  JAK2 V617F myeloproliferative neoplasm    PLAN  Tomasa Sherwood is a 66-year-old pleasant gentleman with medical history significant for recurrent VTE, who was found to have splenomegaly when he presented with lower abdominal pain and constipation. CT scan showed splenic enlargement with heterogeneous enhancement, along with wedge-shaped areas of hypo enhancement. Laboratory work ups done on 10/16/20 showed mild leukocytosis (WBC 14.1) and JAK2 V617F mutation, consistent with JAK2 positive myeloproliferative neoplasm. The rests of the blood test, including flow cytometry, JAK2 exon 12-15, MPL, CALR, SPEP, serum immunofixation, ESR and LDH were within normal range. On October 6, 2021, he presented to me for follow up. I have been following him for JAK2 positive myeloproliferative neoplasm and he required therapeutic phlebotomy since diagnosis. I believe his splenomegaly is due to JAK2 positive myeloproliferative neoplasm.  The areas of wedge-shaped hypoenhancement are due to splenic infarction, secondary to JAK2 related hypercoagulable state. He is currently on Xarelto 20 mg daily and I added aspirin 81 mg daily since 10/30/20 to prevent both arterial and venous thromboembolic episodes. I recommend him to have therapeutic phlebotomy to keep his hematocrit less than 45%. Since his hematocrit today was less than 45%, he doesn't need phlebotomy this time. 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 He is high risk for thromboembolic episodes. After reviewing risks and benefits, he is in agreement to start hydroxyurea today. Health maintenance - I recommend exercise, low fat and low sodium diet. I answered all his questions and concerns for today. I recommend him to follow up with primary care physician, Dr. Misael Beal on regular basis. Recent imaging and labs were reviewed and discussed with the patient.

## 2021-10-06 ENCOUNTER — OFFICE VISIT (OUTPATIENT)
Dept: ONCOLOGY | Age: 76
End: 2021-10-06
Payer: MEDICARE

## 2021-10-06 ENCOUNTER — HOSPITAL ENCOUNTER (OUTPATIENT)
Dept: INFUSION THERAPY | Age: 76
Discharge: HOME | End: 2021-10-06
Payer: MEDICARE

## 2021-10-06 VITALS
WEIGHT: 204 LBS | OXYGEN SATURATION: 96 % | SYSTOLIC BLOOD PRESSURE: 165 MMHG | DIASTOLIC BLOOD PRESSURE: 77 MMHG | RESPIRATION RATE: 16 BRPM | BODY MASS INDEX: 30.92 KG/M2 | TEMPERATURE: 97.3 F | HEART RATE: 67 BPM | HEIGHT: 68 IN

## 2021-10-06 DIAGNOSIS — D47.1 MYELOPROLIFERATIVE NEOPLASM (HCC): ICD-10-CM

## 2021-10-06 DIAGNOSIS — D47.1 MYELOPROLIFERATIVE NEOPLASM (HCC): Primary | ICD-10-CM

## 2021-10-06 LAB
ALBUMIN SERPL-MCNC: 4.4 GM/DL (ref 3.4–5)
ALP BLD-CCNC: 128 IU/L (ref 40–129)
ALT SERPL-CCNC: 48 U/L (ref 10–40)
ANION GAP SERPL CALCULATED.3IONS-SCNC: 13 MMOL/L (ref 4–16)
AST SERPL-CCNC: 36 IU/L (ref 15–37)
BASOPHILS ABSOLUTE: 0.5 K/CU MM
BASOPHILS RELATIVE PERCENT: 3 % (ref 0–1)
BILIRUB SERPL-MCNC: 1.3 MG/DL (ref 0–1)
BUN BLDV-MCNC: 11 MG/DL (ref 6–23)
CALCIUM SERPL-MCNC: 9 MG/DL (ref 8.3–10.6)
CHLORIDE BLD-SCNC: 99 MMOL/L (ref 99–110)
CO2: 25 MMOL/L (ref 21–32)
CREAT SERPL-MCNC: 1 MG/DL (ref 0.9–1.3)
DIFFERENTIAL TYPE: ABNORMAL
EOSINOPHILS ABSOLUTE: 0.7 K/CU MM
EOSINOPHILS RELATIVE PERCENT: 4.4 % (ref 0–3)
ERYTHROCYTE SEDIMENTATION RATE: 2 MM/HR (ref 0–20)
GFR AFRICAN AMERICAN: >60 ML/MIN/1.73M2
GFR NON-AFRICAN AMERICAN: >60 ML/MIN/1.73M2
GLUCOSE BLD-MCNC: 128 MG/DL (ref 70–99)
HCT VFR BLD CALC: 43.4 % (ref 42–52)
HEMOGLOBIN: 13.9 GM/DL (ref 13.5–18)
LACTATE DEHYDROGENASE: 258 IU/L (ref 120–246)
LYMPHOCYTES ABSOLUTE: 1 K/CU MM
LYMPHOCYTES RELATIVE PERCENT: 6.1 % (ref 24–44)
MCH RBC QN AUTO: 24.1 PG (ref 27–31)
MCHC RBC AUTO-ENTMCNC: 32 % (ref 32–36)
MCV RBC AUTO: 75.2 FL (ref 78–100)
MONOCYTES ABSOLUTE: 1 K/CU MM
MONOCYTES RELATIVE PERCENT: 6.1 % (ref 0–4)
PDW BLD-RTO: 18.6 % (ref 11.7–14.9)
PLATELET # BLD: 307 K/CU MM (ref 140–440)
PMV BLD AUTO: 9.9 FL (ref 7.5–11.1)
POTASSIUM SERPL-SCNC: 3.7 MMOL/L (ref 3.5–5.1)
RBC # BLD: 5.77 M/CU MM (ref 4.6–6.2)
SEGMENTED NEUTROPHILS ABSOLUTE COUNT: 13.3 K/CU MM
SEGMENTED NEUTROPHILS RELATIVE PERCENT: 80.4 % (ref 36–66)
SODIUM BLD-SCNC: 137 MMOL/L (ref 135–145)
TOTAL PROTEIN: 6.4 GM/DL (ref 6.4–8.2)
WBC # BLD: 16.5 K/CU MM (ref 4–10.5)

## 2021-10-06 PROCEDURE — 85025 COMPLETE CBC W/AUTO DIFF WBC: CPT

## 2021-10-06 PROCEDURE — 85652 RBC SED RATE AUTOMATED: CPT

## 2021-10-06 PROCEDURE — 36415 COLL VENOUS BLD VENIPUNCTURE: CPT

## 2021-10-06 PROCEDURE — 99211 OFF/OP EST MAY X REQ PHY/QHP: CPT

## 2021-10-06 PROCEDURE — G8417 CALC BMI ABV UP PARAM F/U: HCPCS | Performed by: INTERNAL MEDICINE

## 2021-10-06 PROCEDURE — 83615 LACTATE (LD) (LDH) ENZYME: CPT

## 2021-10-06 PROCEDURE — G8484 FLU IMMUNIZE NO ADMIN: HCPCS | Performed by: INTERNAL MEDICINE

## 2021-10-06 PROCEDURE — 80053 COMPREHEN METABOLIC PANEL: CPT

## 2021-10-06 PROCEDURE — G8427 DOCREV CUR MEDS BY ELIG CLIN: HCPCS | Performed by: INTERNAL MEDICINE

## 2021-10-06 PROCEDURE — 1036F TOBACCO NON-USER: CPT | Performed by: INTERNAL MEDICINE

## 2021-10-06 PROCEDURE — 1123F ACP DISCUSS/DSCN MKR DOCD: CPT | Performed by: INTERNAL MEDICINE

## 2021-10-06 PROCEDURE — 99214 OFFICE O/P EST MOD 30 MIN: CPT | Performed by: INTERNAL MEDICINE

## 2021-10-06 PROCEDURE — 4040F PNEUMOC VAC/ADMIN/RCVD: CPT | Performed by: INTERNAL MEDICINE

## 2021-10-06 RX ORDER — HYDROXYUREA 500 MG/1
500 CAPSULE ORAL DAILY
Qty: 30 CAPSULE | Refills: 3 | Status: SHIPPED | OUTPATIENT
Start: 2021-10-06 | End: 2021-11-05

## 2021-10-06 ASSESSMENT — PATIENT HEALTH QUESTIONNAIRE - PHQ9
SUM OF ALL RESPONSES TO PHQ QUESTIONS 1-9: 0
SUM OF ALL RESPONSES TO PHQ9 QUESTIONS 1 & 2: 0
1. LITTLE INTEREST OR PLEASURE IN DOING THINGS: 0
2. FEELING DOWN, DEPRESSED OR HOPELESS: 0
SUM OF ALL RESPONSES TO PHQ QUESTIONS 1-9: 0
SUM OF ALL RESPONSES TO PHQ QUESTIONS 1-9: 0

## 2021-10-06 NOTE — PROGRESS NOTES
MA Rooming Questions  Patient: Maria Luisa Carrillo  MRN: [id number]    Date: 10/6/2021        1. Do you have any new issues?   no         2. Do you need any refills on medications?    no    3. Have you had any imaging done since your last visit?   no    4. Have you been hospitalized or seen in the emergency room since your last visit here?   no    5. Did the patient have a depression screening completed today?  Yes    PHQ-9 Total Score: 0 (10/6/2021 12:55 PM)       PHQ-9 Given to (if applicable):               PHQ-9 Score (if applicable):                     [] Positive     []  Negative              Does question #9 need addressed (if applicable)                     [] Yes    []  No               Marquis Maggi CMA

## 2022-01-03 NOTE — PROGRESS NOTES
Patient Name: Gaye Ko  Patient : 1945  Patient MRN: [id number]     Primary Oncologist: Dario Friedman MD  Referring Provider: David Moon MD     Date of Service: 2022      Chief Complaint:   No chief complaint on file. Patient Active Problem List:     Recurrent deep venous thrombosis      Splenomegaly     JAK2 positive myeloproliferative neoplasm    HPI:   Gaye Ko is a 79-year-old pleasant gentleman with medical history significant for hypertension, GERD and recurrent VTE, currently on long term anticoagulation therapy with Xarelto, initially referred to me on 2020 for evaluation of his splenomegaly. He stated that he presented to [facility] on 10/15/20 with lower abdominal pain and constipation. He was found to have impacted stool and he was treated with soapsuds enema. His symptom resolved after he had bowel movement. Because of his history of small bowel obstruction, CT scan was ordered. It showed splenic enlargement with heterogeneous enhancement, along with wedge-shaped areas of hypo enhancement. The enlargement is nonspecific, but can be reactive to inflammation or infection elsewhere. However, neoplasm (especially lymphoma) must also be considered. The areas of wedge-shaped hypoenhancement may be secondary to the reactive process, but superimposed necrosis/infarction is more likely. He was released from hospital with an appointment to see me as an out patient. He has about 35 pound weight loss over one year duration. Laboratory work ups done on 10/16/20 showed mild leukocytosis (WBC 14.1) and JAK2 V617F mutation, consistent with JAK2 positive myeloproliferative neoplasm. The rests of the blood test, including flow cytometry, JAK2 exon 12-15, MPL, CALR, SPEP, serum immunofixation, ESR and LDH were within normal range. On 2022, he presented to me for follow up.   I have been following him for JAK2 positive myeloproliferative neoplasm and he required therapeutic phlebotomy since diagnosis. I believe his splenomegaly is due to JAK2 positive myeloproliferative neoplasm. The areas of wedge-shaped hypoenhancement are due to splenic infarction, secondary to JAK2 related hypercoagulable state. He is currently on Xarelto 20 mg daily and I added aspirin 81 mg daily since 10/30/20 to prevent both arterial and venous thromboembolic episodes. I recommend him to have therapeutic phlebotomy to keep his hematocrit less than 45%. Since his hematocrit today was 47.9%, I recommended to have 1 phlebotomy. 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 However, he developed hydroxyurea infused skin rashes and we have to stop it soon after starting it. Since he could not tolerate hydroxyurea, I will continue with therapeutic phlebotomy as needed basis only. Health maintenance - I recommend exercise, low fat and low sodium diet. He doesn't have any significant symptoms at today visit. Patient's previous history of VTE  He reported that he hit his left leg to the side of his truck and then developed a LLE DVT in 2014 when he was placed on Coumadin. He was on it for 6 months. It looks like he developed RLE(does not remember any provoking factors) in 2015 when he was placed on coumadin which was switched to Xarelto prior to his PCP retired. Reported that in November 2017 he was off of Xarelto for 7 days for teeth extraction. Developed RLE DVT. Xarelto resumed and and has been very compliant with it since. On March 3, 2019, he presented with right calf pain and swelling. Doppler showed not compressible in the region of popliteal vein and distal femoral vein which suggests DVT. He was switched to heparin, transition to coumadin.  Later on, he was switched back to xarelto since he has difficulty achiever continuous therapeutic INR. (also wasn't sure doppler done on 3/2019 showed chronic or acute DVT). He has been on xarelto since then. 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 Past Medical History  History of DVT  Hypertension  Herpes zoster  GERD    Surgical History  Cholecystectomy   Laparoscopic appendectomy  Mandible fracture surgery    Allergies  allopurinol    Social History  Retired 12 years ago, worked at [employer]. No tob/etoh or any other illicit drug abuse. Family History  Reported that brother had a history of malignancy but was not sure what kind of malignancy. Review of Systems: \"Per interval history; otherwise 10 point ROS is negative. \"  His energy level is fair and appetite is good. His sleep is fine. He denies fever, chills, night sweats, cough, shortness of breath, chest pain, hemoptysis or palpitations. His bowel and bladder functions are malia. He doesn't have nausea, vomiting, abdominal pain, diarrhea, constipation, dysuria, loss of appetite or weight loss. He denies neuropathy and he doesn't have bleeding or clotting issues. He denies any pain. No anxiety or depression. The rest of the systems are unremarkable. Vital Signs: There were no vitals taken for this visit.     Physical Exam:  CONSTITUTIONAL: awake, no apparent distress, alert, cooperative,    EYES: pupils equal, sclera clear and conjunctiva normal, round and reactive to light,   ENT: without obvious abnormality, normocephalic, atraumatic  NECK: supple, symmetrical, no jugular venous distension and no carotid bruits   HEMATOLOGIC/LYMPHATIC: no cervical, supraclavicular or axillary lymphadenopathy   LUNGS: VBS, no wheezes, clear to auscultation, no rhonchi, no increased work of breathing, no crackles,  CARDIOVASCULAR: normal S1 and S2, regular rate and rhythm, no murmur noted  ABDOMEN: soft, non-distended, non-tender, normal bowel sounds x 4, no masses palpated, no hepatosplenomegaly   MUSCULOSKELETAL: full range of motion noted, tone is normal  NEUROLOGIC: awake, alert, oriented to name, place and time. Motor skills grossly intact. SKIN: Normal skin color, texture, turgor and no jaundice.  appears intact   EXTREMITIES: no leg swelling, no LE edema, no clubbing, no cyanosis,       Labs:  Hematology:  Lab Results   Component Value Date    WBC 16.5 (H) 10/06/2021    RBC 5.77 10/06/2021    HGB 13.9 10/06/2021    HCT 43.4 10/06/2021    MCV 75.2 (L) 10/06/2021    MCH 24.1 (L) 10/06/2021    MCHC 32.0 10/06/2021    RDW 18.6 (H) 10/06/2021     10/06/2021    MPV 9.9 10/06/2021    BANDSPCT 3 (L) 08/11/2020    SEGSPCT 80.4 (H) 10/06/2021    EOSRELPCT 4.4 (H) 10/06/2021    BASOPCT 3.0 (H) 10/06/2021    LYMPHOPCT 6.1 (L) 10/06/2021    MONOPCT 6.1 (H) 10/06/2021    BANDABS 0.50 08/11/2020    SEGSABS 13.3 10/06/2021    EOSABS 0.7 10/06/2021    BASOSABS 0.5 10/06/2021    LYMPHSABS 1.0 10/06/2021    MONOSABS 1.0 10/06/2021    DIFFTYPE AUTOMATED DIFFERENTIAL 10/06/2021    PLTM SEVERAL LARGE PLATELETS 60/30/6808     Lab Results   Component Value Date    ESR 2 10/06/2021     Chemistry:  Lab Results   Component Value Date     10/06/2021    K 3.7 10/06/2021    CL 99 10/06/2021    CO2 25 10/06/2021    BUN 11 10/06/2021    CREATININE 1.0 10/06/2021    GLUCOSE 128 (H) 10/06/2021    CALCIUM 9.0 10/06/2021    PROT 6.4 10/06/2021    LABALBU 4.4 10/06/2021    BILITOT 1.3 (H) 10/06/2021    ALKPHOS 128 10/06/2021    AST 36 10/06/2021    ALT 48 (H) 10/06/2021    LABGLOM >60 10/06/2021    GFRAA >60 10/06/2021    MG 2.2 08/10/2020    POCGLU 124 (H) 06/02/2017     Lab Results   Component Value Date     (H) 10/06/2021    HOMOCYSTEINE 19.1 (H) 03/10/2019     No components found for: LD  Lab Results   Component Value Date    TSHHS 0.591 07/12/2018     Immunology:  Lab Results   Component Value Date    PROT 6.4 10/06/2021    SPEP  10/16/2020     INTERPRETATION - No monoclonal proteins identified. SAF    ALBUMINELP 3.4 10/16/2020    LABALPH 0.3 10/16/2020    LABALPH 0.7 10/16/2020    LABBETA 0.9 10/16/2020    GAMGLOB 0.9 10/16/2020     Lab Results   Component Value Date    KAPPAUVOL 28.82 (H) 10/16/2020    LAMBDAUVOL 19.74 10/16/2020    KLFLCR 1.46 10/16/2020     Lab Results   Component Value Date    B2M 4.1 (H) 10/16/2020     Coagulation Panel:  Lab Results   Component Value Date    PROTIME 16.2 (H) 08/10/2020    INR 1.33 08/10/2020    APTT 36.4 08/10/2020    DDIMER 837 (H) 08/10/2020     Anemia Panel:  No results found for: RHOOWLTC67, FOLATE  Tumor Markers:  No results found for: , CEA, , LABCA2, PSA  Observations:  No data recorded     Assessment & Plan:   Recurrent VTE  Splenomegaly  JAK2 V617F myeloproliferative neoplasm    PLAN  Kristen Ochoa is a 68-year-old pleasant gentleman with medical history significant for recurrent VTE, who was found to have splenomegaly when he presented with lower abdominal pain and constipation. CT scan showed splenic enlargement with heterogeneous enhancement, along with wedge-shaped areas of hypo enhancement. Laboratory work ups done on 10/16/20 showed mild leukocytosis (WBC 14.1) and JAK2 V617F mutation, consistent with JAK2 positive myeloproliferative neoplasm. The rests of the blood test, including flow cytometry, JAK2 exon 12-15, MPL, CALR, SPEP, serum immunofixation, ESR and LDH were within normal range. On January 6, 2022, he presented to me for follow up. I have been following him for JAK2 positive myeloproliferative neoplasm and he required therapeutic phlebotomy since diagnosis. I believe his splenomegaly is due to JAK2 positive myeloproliferative neoplasm.  The areas of wedge-shaped hypoenhancement are due to splenic infarction, secondary to JAK2 related hypercoagulable state. He is currently on Xarelto 20 mg daily and I added aspirin 81 mg daily since 10/30/20 to prevent both arterial and venous thromboembolic episodes. I recommend him to have therapeutic phlebotomy to keep his hematocrit less than 45%. Since his hematocrit today was 47.9%, I recommended to have 1 phlebotomy. 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 However, he developed hydroxyurea infused skin rashes and we have to stop it soon after starting it. Since he could not tolerate hydroxyurea, I will continue with therapeutic phlebotomy as needed basis only. Health maintenance - I recommend exercise, low fat and low sodium diet. I answered all his questions and concerns for today. I recommend him to follow up with primary care physician, Dr. Soy Peace on regular basis. Recent imaging and labs were reviewed and discussed with the patient.

## 2022-01-06 ENCOUNTER — OFFICE VISIT (OUTPATIENT)
Dept: ONCOLOGY | Age: 77
End: 2022-01-06
Payer: MEDICARE

## 2022-01-06 ENCOUNTER — HOSPITAL ENCOUNTER (OUTPATIENT)
Dept: INFUSION THERAPY | Age: 77
Discharge: HOME | End: 2022-01-06
Payer: MEDICARE

## 2022-01-06 ENCOUNTER — CLINICAL DOCUMENTATION (OUTPATIENT)
Dept: ONCOLOGY | Age: 77
End: 2022-01-06

## 2022-01-06 VITALS
SYSTOLIC BLOOD PRESSURE: 164 MMHG | WEIGHT: 210 LBS | RESPIRATION RATE: 18 BRPM | DIASTOLIC BLOOD PRESSURE: 70 MMHG | HEIGHT: 68 IN | OXYGEN SATURATION: 97 % | HEART RATE: 69 BPM | TEMPERATURE: 96.5 F | BODY MASS INDEX: 31.83 KG/M2

## 2022-01-06 DIAGNOSIS — D47.1 MYELOPROLIFERATIVE NEOPLASM (HCC): ICD-10-CM

## 2022-01-06 DIAGNOSIS — D47.1 MYELOPROLIFERATIVE NEOPLASM (HCC): Primary | ICD-10-CM

## 2022-01-06 LAB
ALBUMIN SERPL-MCNC: 4.1 GM/DL (ref 3.4–5)
ALP BLD-CCNC: 125 IU/L (ref 40–129)
ALT SERPL-CCNC: 46 U/L (ref 10–40)
ANION GAP SERPL CALCULATED.3IONS-SCNC: 11 MMOL/L (ref 4–16)
AST SERPL-CCNC: 38 IU/L (ref 15–37)
BASOPHILS ABSOLUTE: 0.6 K/CU MM
BASOPHILS RELATIVE PERCENT: 3.3 % (ref 0–1)
BILIRUB SERPL-MCNC: 1.7 MG/DL (ref 0–1)
BUN BLDV-MCNC: 11 MG/DL (ref 6–23)
CALCIUM SERPL-MCNC: 8.9 MG/DL (ref 8.3–10.6)
CHLORIDE BLD-SCNC: 99 MMOL/L (ref 99–110)
CO2: 27 MMOL/L (ref 21–32)
CREAT SERPL-MCNC: 1 MG/DL (ref 0.9–1.3)
DIFFERENTIAL TYPE: ABNORMAL
EOSINOPHILS ABSOLUTE: 0.9 K/CU MM
EOSINOPHILS RELATIVE PERCENT: 5.4 % (ref 0–3)
ERYTHROCYTE SEDIMENTATION RATE: 2 MM/HR (ref 0–20)
GFR AFRICAN AMERICAN: >60 ML/MIN/1.73M2
GFR NON-AFRICAN AMERICAN: >60 ML/MIN/1.73M2
GLUCOSE BLD-MCNC: 116 MG/DL (ref 70–99)
HCT VFR BLD CALC: 47.9 % (ref 42–52)
HEMOGLOBIN: 15 GM/DL (ref 13.5–18)
LACTATE DEHYDROGENASE: 281 IU/L (ref 120–246)
LYMPHOCYTES ABSOLUTE: 1.2 K/CU MM
LYMPHOCYTES RELATIVE PERCENT: 7 % (ref 24–44)
MCH RBC QN AUTO: 24.4 PG (ref 27–31)
MCHC RBC AUTO-ENTMCNC: 31.3 % (ref 32–36)
MCV RBC AUTO: 78 FL (ref 78–100)
MONOCYTES ABSOLUTE: 1.1 K/CU MM
MONOCYTES RELATIVE PERCENT: 6.3 % (ref 0–4)
PDW BLD-RTO: 19.7 % (ref 11.7–14.9)
PLATELET # BLD: 288 K/CU MM (ref 140–440)
PMV BLD AUTO: 9.5 FL (ref 7.5–11.1)
POTASSIUM SERPL-SCNC: 4.1 MMOL/L (ref 3.5–5.1)
RBC # BLD: 6.14 M/CU MM (ref 4.6–6.2)
SEGMENTED NEUTROPHILS ABSOLUTE COUNT: 13.4 K/CU MM
SEGMENTED NEUTROPHILS RELATIVE PERCENT: 78 % (ref 36–66)
SODIUM BLD-SCNC: 137 MMOL/L (ref 135–145)
TOTAL PROTEIN: 6.8 GM/DL (ref 6.4–8.2)
WBC # BLD: 17.2 K/CU MM (ref 4–10.5)

## 2022-01-06 PROCEDURE — 99213 OFFICE O/P EST LOW 20 MIN: CPT | Performed by: INTERNAL MEDICINE

## 2022-01-06 PROCEDURE — 99211 OFF/OP EST MAY X REQ PHY/QHP: CPT

## 2022-01-06 PROCEDURE — 80053 COMPREHEN METABOLIC PANEL: CPT

## 2022-01-06 PROCEDURE — 83615 LACTATE (LD) (LDH) ENZYME: CPT

## 2022-01-06 PROCEDURE — 85025 COMPLETE CBC W/AUTO DIFF WBC: CPT

## 2022-01-06 PROCEDURE — 36415 COLL VENOUS BLD VENIPUNCTURE: CPT

## 2022-01-06 PROCEDURE — G8484 FLU IMMUNIZE NO ADMIN: HCPCS | Performed by: INTERNAL MEDICINE

## 2022-01-06 PROCEDURE — G8417 CALC BMI ABV UP PARAM F/U: HCPCS | Performed by: INTERNAL MEDICINE

## 2022-01-06 PROCEDURE — 1123F ACP DISCUSS/DSCN MKR DOCD: CPT | Performed by: INTERNAL MEDICINE

## 2022-01-06 PROCEDURE — 1036F TOBACCO NON-USER: CPT | Performed by: INTERNAL MEDICINE

## 2022-01-06 PROCEDURE — 85652 RBC SED RATE AUTOMATED: CPT

## 2022-01-06 PROCEDURE — 4040F PNEUMOC VAC/ADMIN/RCVD: CPT | Performed by: INTERNAL MEDICINE

## 2022-01-06 PROCEDURE — G8427 DOCREV CUR MEDS BY ELIG CLIN: HCPCS | Performed by: INTERNAL MEDICINE

## 2022-01-06 RX ORDER — PRAMIPEXOLE DIHYDROCHLORIDE 0.5 MG/1
0.5 TABLET ORAL NIGHTLY
COMMUNITY

## 2022-01-06 NOTE — PROGRESS NOTES
MA Rooming Questions  Patient: Megan Durand  MRN: [id number]    Date: 1/6/2022        1. Do you have any new issues?   no         2. Do you need any refills on medications?    no    3. Have you had any imaging done since your last visit?   no    4. Have you been hospitalized or seen in the emergency room since your last visit here?   no    5. Did the patient have a depression screening completed today?  No    No data recorded     PHQ-9 Given to (if applicable):               PHQ-9 Score (if applicable):                     [] Positive     []  Negative              Does question #9 need addressed (if applicable)                     [] Yes    []  No               Brianne Sanchez MA

## 2022-01-13 ENCOUNTER — TELEPHONE (OUTPATIENT)
Dept: ONCOLOGY | Age: 77
End: 2022-01-13

## 2022-01-13 NOTE — TELEPHONE ENCOUNTER
Patient called about his phlebotomy, advised him that it has been scheduled for Wed. 01/19 @ 1000, patient states understanding and he will be there

## 2022-01-19 ENCOUNTER — HOSPITAL ENCOUNTER (OUTPATIENT)
Dept: INFUSION THERAPY | Age: 77
Setting detail: INFUSION SERIES
Discharge: HOME | End: 2022-01-19
Payer: MEDICARE

## 2022-01-19 VITALS
OXYGEN SATURATION: 96 % | HEART RATE: 63 BPM | RESPIRATION RATE: 16 BRPM | SYSTOLIC BLOOD PRESSURE: 124 MMHG | TEMPERATURE: 97.3 F | DIASTOLIC BLOOD PRESSURE: 69 MMHG

## 2022-01-19 DIAGNOSIS — D47.1 MYELOPROLIFERATIVE NEOPLASM (HCC): Primary | ICD-10-CM

## 2022-01-19 PROCEDURE — 99195 PHLEBOTOMY: CPT

## 2022-01-19 PROCEDURE — 99211 OFF/OP EST MAY X REQ PHY/QHP: CPT

## 2022-01-19 NOTE — PROGRESS NOTES
Diagnosis: Myleoprolifitive neoplasm  Pre-Phlebotomy: BP BP (!) 141/72   Pulse 56   Temp 97.3 °F (36.3 °C) (Infrared)   Resp 16   SpO2 96%       Post-Phlebotomy: /68, HR 74    Volume Removed: 553 grams by TANA Packer RN    Complications: None    Comments:  Tolerated well            LOT# [id number]    Exp: 5-23      Arturo Hedrick RN

## 2022-04-27 ENCOUNTER — OFFICE VISIT (OUTPATIENT)
Dept: ONCOLOGY | Age: 77
End: 2022-04-27
Payer: MEDICARE

## 2022-04-27 ENCOUNTER — HOSPITAL ENCOUNTER (OUTPATIENT)
Dept: INFUSION THERAPY | Age: 77
Discharge: HOME | End: 2022-04-27
Payer: MEDICARE

## 2022-04-27 VITALS
HEIGHT: 68 IN | BODY MASS INDEX: 32.43 KG/M2 | RESPIRATION RATE: 16 BRPM | DIASTOLIC BLOOD PRESSURE: 69 MMHG | OXYGEN SATURATION: 95 % | HEART RATE: 76 BPM | SYSTOLIC BLOOD PRESSURE: 148 MMHG | WEIGHT: 214 LBS | TEMPERATURE: 96.7 F

## 2022-04-27 DIAGNOSIS — D47.1 MYELOPROLIFERATIVE NEOPLASM (HCC): Primary | ICD-10-CM

## 2022-04-27 DIAGNOSIS — D47.1 MYELOPROLIFERATIVE NEOPLASM (HCC): ICD-10-CM

## 2022-04-27 LAB
ALBUMIN SERPL-MCNC: 4.2 GM/DL (ref 3.4–5)
ALP BLD-CCNC: 115 IU/L (ref 40–128)
ALT SERPL-CCNC: 32 U/L (ref 10–40)
ANION GAP SERPL CALCULATED.3IONS-SCNC: 11 MMOL/L (ref 4–16)
AST SERPL-CCNC: 31 IU/L (ref 15–37)
BASOPHILS ABSOLUTE: 0.5 K/CU MM
BASOPHILS RELATIVE PERCENT: 3.6 % (ref 0–1)
BILIRUB SERPL-MCNC: 1.5 MG/DL (ref 0–1)
BUN BLDV-MCNC: 10 MG/DL (ref 6–23)
CALCIUM SERPL-MCNC: 8.4 MG/DL (ref 8.3–10.6)
CHLORIDE BLD-SCNC: 101 MMOL/L (ref 99–110)
CO2: 26 MMOL/L (ref 21–32)
CREAT SERPL-MCNC: 1.2 MG/DL (ref 0.9–1.3)
DIFFERENTIAL TYPE: ABNORMAL
EOSINOPHILS ABSOLUTE: 0.7 K/CU MM
EOSINOPHILS RELATIVE PERCENT: 4.7 % (ref 0–3)
GFR AFRICAN AMERICAN: >60 ML/MIN/1.73M2
GFR NON-AFRICAN AMERICAN: 59 ML/MIN/1.73M2
GLUCOSE BLD-MCNC: 122 MG/DL (ref 70–99)
HCT VFR BLD CALC: 43.3 % (ref 42–52)
HEMOGLOBIN: 13.5 GM/DL (ref 13.5–18)
LACTATE DEHYDROGENASE: 278 IU/L (ref 120–246)
LYMPHOCYTES ABSOLUTE: 0.9 K/CU MM
LYMPHOCYTES RELATIVE PERCENT: 6.3 % (ref 24–44)
MCH RBC QN AUTO: 24.2 PG (ref 27–31)
MCHC RBC AUTO-ENTMCNC: 31.2 % (ref 32–36)
MCV RBC AUTO: 77.5 FL (ref 78–100)
MONOCYTES ABSOLUTE: 0.7 K/CU MM
MONOCYTES RELATIVE PERCENT: 5 % (ref 0–4)
PDW BLD-RTO: 18.9 % (ref 11.7–14.9)
PLATELET # BLD: 325 K/CU MM (ref 140–440)
PMV BLD AUTO: 9.4 FL (ref 7.5–11.1)
POTASSIUM SERPL-SCNC: 4 MMOL/L (ref 3.5–5.1)
RBC # BLD: 5.59 M/CU MM (ref 4.6–6.2)
SEGMENTED NEUTROPHILS ABSOLUTE COUNT: 12 K/CU MM
SEGMENTED NEUTROPHILS RELATIVE PERCENT: 80.4 % (ref 36–66)
SODIUM BLD-SCNC: 138 MMOL/L (ref 135–145)
TOTAL PROTEIN: 6.2 GM/DL (ref 6.4–8.2)
WBC # BLD: 14.9 K/CU MM (ref 4–10.5)

## 2022-04-27 PROCEDURE — 80053 COMPREHEN METABOLIC PANEL: CPT

## 2022-04-27 PROCEDURE — 1036F TOBACCO NON-USER: CPT | Performed by: INTERNAL MEDICINE

## 2022-04-27 PROCEDURE — 4040F PNEUMOC VAC/ADMIN/RCVD: CPT | Performed by: INTERNAL MEDICINE

## 2022-04-27 PROCEDURE — 83615 LACTATE (LD) (LDH) ENZYME: CPT

## 2022-04-27 PROCEDURE — 85025 COMPLETE CBC W/AUTO DIFF WBC: CPT

## 2022-04-27 PROCEDURE — 36415 COLL VENOUS BLD VENIPUNCTURE: CPT

## 2022-04-27 PROCEDURE — 1123F ACP DISCUSS/DSCN MKR DOCD: CPT | Performed by: INTERNAL MEDICINE

## 2022-04-27 PROCEDURE — G8417 CALC BMI ABV UP PARAM F/U: HCPCS | Performed by: INTERNAL MEDICINE

## 2022-04-27 PROCEDURE — 99213 OFFICE O/P EST LOW 20 MIN: CPT | Performed by: INTERNAL MEDICINE

## 2022-04-27 PROCEDURE — G8427 DOCREV CUR MEDS BY ELIG CLIN: HCPCS | Performed by: INTERNAL MEDICINE

## 2022-04-27 ASSESSMENT — PATIENT HEALTH QUESTIONNAIRE - PHQ9
1. LITTLE INTEREST OR PLEASURE IN DOING THINGS: 0
SUM OF ALL RESPONSES TO PHQ QUESTIONS 1-9: 0
SUM OF ALL RESPONSES TO PHQ QUESTIONS 1-9: 0
SUM OF ALL RESPONSES TO PHQ9 QUESTIONS 1 & 2: 0
SUM OF ALL RESPONSES TO PHQ QUESTIONS 1-9: 0
2. FEELING DOWN, DEPRESSED OR HOPELESS: 0
SUM OF ALL RESPONSES TO PHQ QUESTIONS 1-9: 0

## 2022-04-27 NOTE — PROGRESS NOTES
MA Rooming Questions  Patient: Adriel Antunez  MRN: [id number]    Date: 4/27/2022        1. Do you have any new issues?   no         2. Do you need any refills on medications?    no    3. Have you had any imaging done since your last visit?   no    4. Have you been hospitalized or seen in the emergency room since your last visit here?   no    5. Did the patient have a depression screening completed today?  Yes    PHQ-9 Total Score: 0 (4/27/2022  2:02 PM)       PHQ-9 Given to (if applicable):               PHQ-9 Score (if applicable):                     [] Positive     [x]  Negative              Does question #9 need addressed (if applicable)                     [] Yes    []  No               Heather Alonzo MA

## 2022-07-06 ENCOUNTER — APPOINTMENT (OUTPATIENT)
Dept: CT IMAGING | Age: 77
End: 2022-07-06
Payer: MEDICARE

## 2022-07-06 ENCOUNTER — HOSPITAL ENCOUNTER (EMERGENCY)
Age: 77
Discharge: HOME | End: 2022-07-06
Attending: EMERGENCY MEDICINE
Payer: MEDICARE

## 2022-07-06 VITALS
SYSTOLIC BLOOD PRESSURE: 135 MMHG | HEART RATE: 74 BPM | TEMPERATURE: 98.3 F | BODY MASS INDEX: 30.47 KG/M2 | DIASTOLIC BLOOD PRESSURE: 67 MMHG | RESPIRATION RATE: 18 BRPM | WEIGHT: 201.06 LBS | HEIGHT: 68 IN | OXYGEN SATURATION: 96 %

## 2022-07-06 DIAGNOSIS — M62.838 SPASM OF MUSCLE: Primary | ICD-10-CM

## 2022-07-06 DIAGNOSIS — S39.012A BACK STRAIN, INITIAL ENCOUNTER: ICD-10-CM

## 2022-07-06 PROCEDURE — 72131 CT LUMBAR SPINE W/O DYE: CPT

## 2022-07-06 PROCEDURE — 6370000000 HC RX 637 (ALT 250 FOR IP): Performed by: NURSE PRACTITIONER

## 2022-07-06 PROCEDURE — 6360000002 HC RX W HCPCS: Performed by: NURSE PRACTITIONER

## 2022-07-06 PROCEDURE — 96372 THER/PROPH/DIAG INJ SC/IM: CPT

## 2022-07-06 PROCEDURE — 99284 EMERGENCY DEPT VISIT MOD MDM: CPT

## 2022-07-06 PROCEDURE — 72128 CT CHEST SPINE W/O DYE: CPT

## 2022-07-06 RX ORDER — METHOCARBAMOL 500 MG/1
750 TABLET, FILM COATED ORAL ONCE
Status: COMPLETED | OUTPATIENT
Start: 2022-07-06 | End: 2022-07-06

## 2022-07-06 RX ORDER — KETOROLAC TROMETHAMINE 30 MG/ML
30 INJECTION, SOLUTION INTRAMUSCULAR; INTRAVENOUS ONCE
Status: COMPLETED | OUTPATIENT
Start: 2022-07-06 | End: 2022-07-06

## 2022-07-06 RX ORDER — LIDOCAINE 50 MG/G
1 PATCH TOPICAL DAILY
Qty: 10 PATCH | Refills: 0 | Status: SHIPPED | OUTPATIENT
Start: 2022-07-06 | End: 2022-07-16

## 2022-07-06 RX ORDER — METHOCARBAMOL 500 MG/1
500 TABLET, FILM COATED ORAL 3 TIMES DAILY
Qty: 21 TABLET | Refills: 0 | Status: SHIPPED | OUTPATIENT
Start: 2022-07-06 | End: 2022-07-13

## 2022-07-06 RX ADMIN — METHOCARBAMOL 750 MG: 500 TABLET ORAL at 09:43

## 2022-07-06 RX ADMIN — KETOROLAC TROMETHAMINE 30 MG: 30 INJECTION, SOLUTION INTRAMUSCULAR; INTRAVENOUS at 09:44

## 2022-07-06 ASSESSMENT — PAIN DESCRIPTION - LOCATION
LOCATION: BACK
LOCATION: BACK;HIP
LOCATION: BACK
LOCATION: BACK;HIP

## 2022-07-06 ASSESSMENT — PAIN DESCRIPTION - ORIENTATION
ORIENTATION: RIGHT;MID
ORIENTATION: RIGHT;LOWER
ORIENTATION: LOWER
ORIENTATION: LOWER

## 2022-07-06 ASSESSMENT — PAIN SCALES - GENERAL
PAINLEVEL_OUTOF10: 0
PAINLEVEL_OUTOF10: 9
PAINLEVEL_OUTOF10: 0

## 2022-07-06 ASSESSMENT — PAIN DESCRIPTION - DESCRIPTORS
DESCRIPTORS: ACHING;DISCOMFORT
DESCRIPTORS: DISCOMFORT;ACHING
DESCRIPTORS: ACHING;DISCOMFORT
DESCRIPTORS: SHARP

## 2022-07-06 ASSESSMENT — PAIN DESCRIPTION - FREQUENCY: FREQUENCY: INTERMITTENT

## 2022-07-06 ASSESSMENT — PAIN DESCRIPTION - PAIN TYPE: TYPE: CHRONIC PAIN

## 2022-07-06 ASSESSMENT — PAIN DESCRIPTION - ONSET: ONSET: AWAKENED FROM SLEEP

## 2022-07-06 ASSESSMENT — PAIN - FUNCTIONAL ASSESSMENT: PAIN_FUNCTIONAL_ASSESSMENT: NONE - DENIES PAIN

## 2022-07-06 NOTE — ED PROVIDER NOTES
I independently examined and evaluated Rosales Hennessy. In brief, 31-year-old male presents with complaint of right low back pain, started a couple weeks ago after being iesha on his lawnmower. Was given medications from PCP, tramadol, not helping. He denies weakness and numbness in his extremities, no incontinence. No other trauma or injury or fall. No abdominal pain. No nausea or vomiting. No chest pain. Focused exam revealed patient with mild tenderness palpation over right lower lumbar paraspinal muscles without any skin changes. Strength equal bilateral lower extremities with sensation intact. He is in no acute distress laying on the bed. .    ED course: After dose of Toradol and Robaxin his pain was entirely gone. CT thoracic and lumbar spine were obtained given his age and continued symptoms over the last 2 weeks to ensure there was no compression fracture. This was negative. No neuro complaints, I am not concerned for cauda equina, plan will be for discharge home, his pain currently is completely resolved, advised close follow-up with PCP, return if symptoms worsening    All diagnostic, treatment, and disposition decisions were made by myself in conjunction with the advanced practice provider. I personally saw the patient and performed a substantive portion of the visit including all aspects of the medical decision making. For all further details of the patient's emergency department visit, please see the advanced practice provider's documentation. Comment: Please note this report has been produced using speech recognition software and may contain errors related to that system including errors in grammar, punctuation, and spelling, as well as words and phrases that may be inappropriate. If there are any questions or concerns please feel free to contact the dictating provider for clarification.         Jasmin Johns MD  07/06/22 0156

## 2022-07-06 NOTE — ED PROVIDER NOTES
[address] ENCOUNTER      Pt Name: Jay Harden  TKP:[id number]  [place] 1945  Date of evaluation: 7/6/2022  Provider: Hannah Méndez MD      CHIEF COMPLAINT    Chief Complaint   Patient presents with    Back Pain     lower back pain, recently seen by PCP and prescribed medications, states they arent working          I have seen and evaluated this patient with my supervising physician No att. providers found. HPI    Jay Harden is a 68 y.o. male with medical history significant for hypertension, GERD and recurrent VTE, currently on long term anticoagulation therapy with Xarelto, who comes to the emergency department with back pain. Discomfort started about 3 weeks ago while he was mowing on his riding mower. He states the air seal went out and so he was bumping along without any cushion. Did see his primary care provider who put him a course of steroids and tramadol however the patient reports no relief at all after this medication. He is here today requesting x-ray. The pain is located right lower hip. It is rated 7/10. There are no relieving or exacerbating factors. Patient denies any back pain red flags including loss of bowel or bladder, Urinary retention, fever, saddle paresthesia.     REVIEW OF SYSTEMS    Review of Systems   Constitutional:  Denies fever, chills, weight loss or weakness   HENT:  Denies sore throat or ear pain   Cardiovascular:  Denies chest pain, denies palpitations  Respiratory:  Denies shortness of breath, denies cough  GI:  Denies abdominal pain, nausea, vomiting, or diarrhea, constipation, hematochezia, melena  :  Denies any urinary difficulty, frequency, hematuria  Musculoskeletal: Reports right sided paraspinal back pain  Skin:  Denies rash or lesions  Neurologic:  Denies headache, visual changes, focal weakness or sensory changes   Endocrine:  Denies polyuria or polydypsia   Lymphatic:  Denies swollen glands   Psychiatric: Denies depression, SI, HI    See HPI and nursing notes for additional information     PAST MEDICAL AND SURGICAL HISTORY    Past Medical History:   Diagnosis Date    [facility] venous [facility]     History of Holter monitoring 07/28/2017    Rhythm is sinus    Hx of blood clots     bilateral legs in 2018    Hypertension     Myeloproliferative neoplasm ([facility] 10/30/2020    Shingles      Past Surgical History:   Procedure Laterality Date    APPENDECTOMY      CHOLECYSTECTOMY      COLONOSCOPY  10/22/2019    grade 1 internal hemorrhoids    COLONOSCOPY N/A 10/22/2019    COLONOSCOPY DIAGNOSTIC performed by Daria Naylor MD at [place] [address]    Current Outpatient Rx   Medication Sig Dispense Refill    methocarbamol (ROBAXIN) 500 MG tablet Take 1 tablet by mouth 3 times daily for 7 days 21 tablet 0    lidocaine (LIDODERM) 5 % Place 1 patch onto the skin daily for 10 days 12 hours on, 12 hours off. 10 patch 0    pramipexole (MIRAPEX) 0.5 MG tablet Take 0.5 mg by mouth nightly      amLODIPine (NORVASC) 5 MG tablet Take 5 mg by mouth daily      rivaroxaban (XARELTO) 20 MG TABS tablet Take 20 mg by mouth daily      metoprolol succinate (TOPROL XL) 25 MG extended release tablet Take 25 mg by mouth daily      colchicine-probenecid 0.5-500 MG per tablet Take 1 tablet by mouth daily.  indapamide (LOZOL) 1.25 MG tablet Take 1.25 mg by mouth every morning.  potassium chloride SA (K-DUR;KLOR-CON M) 20 MEQ tablet Take 20 mEq by mouth 2 times daily.          ALLERGIES    Allergies   Allergen Reactions    Allopurinol Hives    Amoxicillin        SOCIAL AND FAMILY HISTORY    Social History     Socioeconomic History    Marital status:      Spouse name: None    Number of children: None    Years of education: None    Highest education level: None   Occupational History    None Tobacco Use    Smoking status: Never Smoker    Smokeless tobacco: Never Used   Vaping Use    Vaping Use: Never used   Substance and Sexual Activity    Alcohol use: No    Drug use: No    Sexual activity: None   Other Topics Concern    None   Social History Narrative    None     Social Determinants of Health     Financial Resource Strain:     Difficulty of Paying Living Expenses: Not on file   Food Insecurity:     Worried About Running Out of Food in the Last Year: Not on file    Waqas of Food in the Last Year: Not on file   Transportation Needs:     Lack of Transportation (Medical): Not on file    Lack of Transportation (Non-Medical): Not on file   Physical Activity:     Days of Exercise per Week: Not on file    Minutes of Exercise per Session: Not on file   Stress:     Feeling of Stress : Not on file   Social Connections:     Frequency of Communication with Friends and Family: Not on file    Frequency of Social Gatherings with Friends and Family: Not on file    Attends [employer]ism Services: Not on file    Active Member of [address] [place] [address] or Organizations: Not on file    Attends Club or Organization Meetings: Not on file    Marital Status: Not on file   Intimate Partner Violence:     Fear of Current or Ex-Partner: Not on file    Emotionally Abused: Not on file    Physically Abused: Not on file    Sexually Abused: Not on file   Housing Stability:     Unable to Pay for Housing in the Last Year: Not on file    Number of [place] in the Last Year: Not on file    Unstable Housing in the Last Year: Not on file     History reviewed. No pertinent family history. PHYSICAL EXAM    Physical Exam     VITAL SIGNS: /67   Pulse 74   Temp 98.3 °F (36.8 °C)   Resp 18   Ht 5' 7.99\" (1.727 m)   Wt 201 lb 1 oz (91.2 kg)   SpO2 96%   BMI 30.58 kg/m²      Constitutional:  Well developed, well nourished, no acute distress   Head: Normocephalic, Atraumatic  Eyes:  EOMI. Sclera clear.  Conjunctiva Weight: 201 lb (91.2 kg)  201 lb 1 oz (91.2 kg)    Height:   5' 7.99\" (1.727 m)         This is an alert and oriented x4, 68 y.o. yo male who presents emergency department with back pain. Patient has easy nonlabored respirations. Vital signs within acceptable parameters. Patient is afebrile and in no acute distress. Assessment as noted above. Patient treated for his discomfort. CT of the thoracic and lumbar spines obtained. Radiologist interpretation as noted above. Upon reassessment, patient reports improvement of his back pain. Is this patient to be included in the SEP-1 Core Measure due to severe sepsis or septic shock? No       Test results reviewed with the patient. Symptomatic management rest.  Patient discharged with medications to help with his discomfort. Pt denied any further questions or concerns. Strict ED return guidelines reviewed and  Pt is discharged in good condition. Vital signs and nursing notes reviewed during ED course. Clinical  IMPRESSION    1. Spasm of muscle    2. Back strain, initial encounter        Comment: Please note this report has been produced using speech recognition software and may contain errors related to that system including errors in grammar, punctuation, and spelling, as well as words and phrases that may be inappropriate. If there are any questions or concerns please feel free to contact the dictating provider for clarification.         CORINA Newell CNP  07/06/22 [id number]

## 2022-07-28 ENCOUNTER — HOSPITAL ENCOUNTER (OUTPATIENT)
Dept: INFUSION THERAPY | Age: 77
Discharge: HOME | End: 2022-07-28
Payer: MEDICARE

## 2022-07-28 ENCOUNTER — OFFICE VISIT (OUTPATIENT)
Dept: ONCOLOGY | Age: 77
End: 2022-07-28
Payer: MEDICARE

## 2022-07-28 VITALS
BODY MASS INDEX: 31.39 KG/M2 | TEMPERATURE: 98 F | OXYGEN SATURATION: 98 % | HEIGHT: 67 IN | WEIGHT: 200 LBS | DIASTOLIC BLOOD PRESSURE: 63 MMHG | HEART RATE: 74 BPM | RESPIRATION RATE: 16 BRPM | SYSTOLIC BLOOD PRESSURE: 134 MMHG

## 2022-07-28 DIAGNOSIS — D47.1 MYELOPROLIFERATIVE NEOPLASM (HCC): ICD-10-CM

## 2022-07-28 DIAGNOSIS — D47.1 MYELOPROLIFERATIVE NEOPLASM (HCC): Primary | ICD-10-CM

## 2022-07-28 LAB
ALBUMIN SERPL-MCNC: 4.1 GM/DL (ref 3.4–5)
ALP BLD-CCNC: 129 IU/L (ref 40–128)
ALT SERPL-CCNC: 29 U/L (ref 10–40)
ANION GAP SERPL CALCULATED.3IONS-SCNC: 12 MMOL/L (ref 4–16)
AST SERPL-CCNC: 34 IU/L (ref 15–37)
BASOPHILS ABSOLUTE: 0.4 K/CU MM
BASOPHILS RELATIVE PERCENT: 3.8 % (ref 0–1)
BILIRUB SERPL-MCNC: 1.4 MG/DL (ref 0–1)
BUN BLDV-MCNC: 10 MG/DL (ref 6–23)
CALCIUM SERPL-MCNC: 8.8 MG/DL (ref 8.3–10.6)
CHLORIDE BLD-SCNC: 103 MMOL/L (ref 99–110)
CO2: 26 MMOL/L (ref 21–32)
CREAT SERPL-MCNC: 0.9 MG/DL (ref 0.9–1.3)
DIFFERENTIAL TYPE: ABNORMAL
EOSINOPHILS ABSOLUTE: 0.7 K/CU MM
EOSINOPHILS RELATIVE PERCENT: 6 % (ref 0–3)
ERYTHROCYTE SEDIMENTATION RATE: 3 MM/HR (ref 0–20)
GFR AFRICAN AMERICAN: >60 ML/MIN/1.73M2
GFR NON-AFRICAN AMERICAN: >60 ML/MIN/1.73M2
GLUCOSE BLD-MCNC: 121 MG/DL (ref 70–99)
HCT VFR BLD CALC: 44.3 % (ref 42–52)
HEMOGLOBIN: 13.7 GM/DL (ref 13.5–18)
LACTATE DEHYDROGENASE: 393 IU/L (ref 120–246)
LYMPHOCYTES ABSOLUTE: 0.9 K/CU MM
LYMPHOCYTES RELATIVE PERCENT: 8 % (ref 24–44)
MCH RBC QN AUTO: 24.9 PG (ref 27–31)
MCHC RBC AUTO-ENTMCNC: 30.9 % (ref 32–36)
MCV RBC AUTO: ABNORMAL FL (ref 78–100)
MONOCYTES ABSOLUTE: 0.6 K/CU MM
MONOCYTES RELATIVE PERCENT: 5.3 % (ref 0–4)
PDW BLD-RTO: 20.1 % (ref 11.7–14.9)
PLATELET # BLD: 329 K/CU MM (ref 140–440)
PMV BLD AUTO: 10 FL (ref 7.5–11.1)
POTASSIUM SERPL-SCNC: 4.2 MMOL/L (ref 3.5–5.1)
RBC # BLD: 5.5 M/CU MM (ref 4.6–6.2)
SEGMENTED NEUTROPHILS ABSOLUTE COUNT: 8.6 K/CU MM
SEGMENTED NEUTROPHILS RELATIVE PERCENT: 76.9 % (ref 36–66)
SODIUM BLD-SCNC: 141 MMOL/L (ref 135–145)
TOTAL PROTEIN: 6.2 GM/DL (ref 6.4–8.2)
WBC # BLD: 11.2 K/CU MM (ref 4–10.5)

## 2022-07-28 PROCEDURE — 80053 COMPREHEN METABOLIC PANEL: CPT

## 2022-07-28 PROCEDURE — 99213 OFFICE O/P EST LOW 20 MIN: CPT | Performed by: INTERNAL MEDICINE

## 2022-07-28 PROCEDURE — 1123F ACP DISCUSS/DSCN MKR DOCD: CPT | Performed by: INTERNAL MEDICINE

## 2022-07-28 PROCEDURE — 85652 RBC SED RATE AUTOMATED: CPT

## 2022-07-28 PROCEDURE — 85025 COMPLETE CBC W/AUTO DIFF WBC: CPT

## 2022-07-28 PROCEDURE — 83615 LACTATE (LD) (LDH) ENZYME: CPT

## 2022-07-28 PROCEDURE — 36415 COLL VENOUS BLD VENIPUNCTURE: CPT

## 2022-07-28 NOTE — PROGRESS NOTES
Patient Name: Rigo Lora  Patient : 1945  Patient MRN: [id number]     Primary Oncologist: Mejia Dela Cruz MD  Referring Provider: Garcia Hall MD     Date of Service: 2022      Chief Complaint:   Chief Complaint   Patient presents with    Follow-up       Patient Active Problem List:     Recurrent deep venous thrombosis      Splenomegaly     JAK2 positive myeloproliferative neoplasm    HPI:   Rigo Lora is a 26-year-old pleasant gentleman with medical history significant for hypertension, GERD and recurrent VTE, currently on long term anticoagulation therapy with Xarelto, initially referred to me on 2020 for evaluation of his splenomegaly. He stated that he presented to [facility] on 10/15/20 with lower abdominal pain and constipation. He was found to have impacted stool and he was treated with soapsuds enema. His symptom resolved after he had bowel movement. Because of his history of small bowel obstruction, CT scan was ordered. It showed splenic enlargement with heterogeneous enhancement, along with wedge-shaped areas of hypo enhancement. The enlargement is nonspecific, but can be reactive to inflammation or infection elsewhere. However, neoplasm (especially lymphoma) must also be considered. The areas of wedge-shaped hypoenhancement may be secondary to the reactive process, but superimposed necrosis/infarction is more likely. He was released from hospital with an appointment to see me as an out patient. He has about 35 pound weight loss over one year duration. Laboratory work ups done on 10/16/20 showed mild leukocytosis (WBC 14.1) and JAK2 V617F mutation, consistent with JAK2 positive myeloproliferative neoplasm. The rests of the blood test, including flow cytometry, JAK2 exon 12-15, MPL, CALR, SPEP, serum immunofixation, ESR and LDH were within normal range. On 2022, he presented to me for follow up.   I back to xarelto since he has difficulty achiever continuous therapeutic INR. (also wasn't sure doppler done on 3/2019 showed chronic or acute DVT). He has been on xarelto since then. 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 Past Medical History  History of DVT  Hypertension  Herpes zoster  GERD    Surgical History  Cholecystectomy   Laparoscopic appendectomy  Mandible fracture surgery    Allergies  allopurinol    Social History  Retired 12 years ago, worked at [employer]. No tob/etoh or any other illicit drug abuse. Family History  Reported that brother had a history of malignancy but was not sure what kind of malignancy. Review of Systems: \"Per interval history; otherwise 10 point ROS is negative. \"  His energy level is fair and his sleep is good. He doesn't have fever, chills, night sweats, cough, shortness of breath, chest pain, hemoptysis or palpitations. His bowel and bladder functions are malia. He denies nausea, vomiting, abdominal pain, diarrhea, constipation, dysuria, loss of appetite or weight loss. He doesn't have neuropathy and he denies bleeding or clotting issues. He denies any pain on today visit. No anxiety or depression. The rest of the systems are unremarkable.      Vital Signs:  /63 (Site: Right Upper Arm, Position: Sitting, Cuff Size: Medium Adult)   Pulse 74   Temp 98 °F (36.7 °C) (Temporal)   Resp 16   Ht 5' 7\" (1.702 m)   Wt 200 lb (90.7 kg)   SpO2 98%   BMI 31.32 kg/m²     Physical Exam:  CONSTITUTIONAL: awake, no apparent distress, alert, cooperative,    EYES: pupils equal, sclera clear and conjunctiva normal, round and reactive to light,   ENT: without obvious abnormality, normocephalic, atraumatic  NECK: supple, symmetrical, no jugular venous distension and no carotid bruits   HEMATOLOGIC/LYMPHATIC: no cervical, supraclavicular or axillary lymphadenopathy   LUNGS: VBS, no wheezes, no increased work of breathing, no crackles, clear to auscultation, no rhonchi,   CARDIOVASCULAR: normal S1 and S2, regular rate and rhythm, no murmur noted  ABDOMEN: soft, non-distended, non-tender, normal bowel sounds x 4, no masses palpated, no hepatosplenomegaly   MUSCULOSKELETAL: full range of motion noted, tone is normal  NEUROLOGIC: awake, alert, oriented to name, place and time. Motor skills grossly intact. SKIN: Normal skin color, texture, turgor and no jaundice.  appears intact   EXTREMITIES: no leg swelling, no clubbing, no cyanosis, no LE edema,       Labs:  Hematology:  Lab Results   Component Value Date    WBC 11.2 (H) 07/28/2022    RBC 5.50 07/28/2022    HGB 13.7 07/28/2022    HCT 44.3 07/28/2022    MCV ADD 07/28/2022    MCH 24.9 (L) 07/28/2022    MCHC 30.9 (L) 07/28/2022    RDW 20.1 (H) 07/28/2022     07/28/2022    MPV 10.0 07/28/2022    BANDSPCT 3 (L) 08/11/2020    SEGSPCT 76.9 (H) 07/28/2022    EOSRELPCT 6.0 (H) 07/28/2022    BASOPCT 3.8 (H) 07/28/2022    LYMPHOPCT 8.0 (L) 07/28/2022    MONOPCT 5.3 (H) 07/28/2022    BANDABS 0.50 08/11/2020    SEGSABS 8.6 07/28/2022    EOSABS 0.7 07/28/2022    BASOSABS 0.4 07/28/2022    LYMPHSABS 0.9 07/28/2022    MONOSABS 0.6 07/28/2022    DIFFTYPE AUTOMATED DIFFERENTIAL 07/28/2022    PLTM SEVERAL LARGE PLATELETS 69/98/1937     Lab Results   Component Value Date    ESR 2 01/06/2022     Chemistry:  Lab Results   Component Value Date     04/27/2022    K 4.0 04/27/2022     04/27/2022    CO2 26 04/27/2022    BUN 10 04/27/2022    CREATININE 1.2 04/27/2022    GLUCOSE 122 (H) 04/27/2022    CALCIUM 8.4 04/27/2022    PROT 6.2 (L) 04/27/2022    LABALBU 4.2 04/27/2022    BILITOT 1.5 (H) 04/27/2022    ALKPHOS 115 04/27/2022    AST 31 04/27/2022    ALT 32 04/27/2022    LABGLOM 59 (L) 04/27/2022    GFRAA >60 04/27/2022    MG 2.2 08/10/2020    POCGLU 124 (H) 06/02/2017     Lab Results   Component Value Date     (H) 04/27/2022    HOMOCYSTEINE 19.1 (H) 03/10/2019     No components found for: LD  Lab Results   Component Value Date    TSHHS 0.591 07/12/2018     Immunology:  Lab Results   Component Value Date    PROT 6.2 (L) 04/27/2022    SPEP  10/16/2020     INTERPRETATION - No monoclonal proteins identified. SAF    ALBUMINELP 3.4 10/16/2020    LABALPH 0.3 10/16/2020    LABALPH 0.7 10/16/2020    LABBETA 0.9 10/16/2020    GAMGLOB 0.9 10/16/2020     Lab Results   Component Value Date    KAPPAUVOL 28.82 (H) 10/16/2020    LAMBDAUVOL 19.74 10/16/2020    KLFLCR 1.46 10/16/2020     Lab Results   Component Value Date    B2M 4.1 (H) 10/16/2020     Coagulation Panel:  Lab Results   Component Value Date    PROTIME 16.2 (H) 08/10/2020    INR 1.33 08/10/2020    APTT 36.4 08/10/2020    DDIMER 837 (H) 08/10/2020     Anemia Panel:  No results found for: [id number], FOLATE  Tumor Markers:  No results found for: , CEA, , LABCA2, PSA  Observations:  No data recorded     Assessment & Plan:   Recurrent VTE  Splenomegaly  JAK2 V617F myeloproliferative neoplasm    PLAN  Fernanda Borden is a 75-year-old pleasant gentleman with medical history significant for recurrent VTE, who was found to have splenomegaly when he presented with lower abdominal pain and constipation. CT scan showed splenic enlargement with heterogeneous enhancement, along with wedge-shaped areas of hypo enhancement. Laboratory work ups done on 10/16/20 showed mild leukocytosis (WBC 14.1) and JAK2 V617F mutation, consistent with JAK2 positive myeloproliferative neoplasm. The rests of the blood test, including flow cytometry, JAK2 exon 12-15, MPL, CALR, SPEP, serum immunofixation, ESR and LDH were within normal range. On July 28, 2022, he presented to me for follow up.   I have been following him for JAK2 positive myeloproliferative neoplasm and he required therapeutic phlebotomy since diagnosis. I believe his splenomegaly is due to JAK2 positive myeloproliferative neoplasm. The areas of wedge-shaped hypoenhancement are due to splenic infarction, secondary to JAK2 related hypercoagulable state. He is currently on Xarelto 20 mg daily and I added aspirin 81 mg daily since 10/30/20 to prevent both arterial and venous thromboembolic episodes. I recommend him to have therapeutic phlebotomy to keep his hematocrit less than 45%. Since his hematocrit today was 44.3%, he doesn't need phlebotomy this time. 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 However, he developed hydroxyurea induced skin rashes and we have to stop it soon after starting it. Since he could not tolerate hydroxyurea, I will continue with therapeutic phlebotomy as needed basis only. Health maintenance - I recommend exercise, low fat and low sodium diet. I answered all his questions and concerns for today. Recent imaging and labs were reviewed and discussed with the patient.

## 2022-12-14 ENCOUNTER — HOSPITAL ENCOUNTER (OUTPATIENT)
Dept: INFUSION THERAPY | Age: 77
Discharge: HOME | End: 2022-12-14
Payer: MEDICARE

## 2022-12-14 ENCOUNTER — OFFICE VISIT (OUTPATIENT)
Dept: ONCOLOGY | Age: 77
End: 2022-12-14
Payer: MEDICARE

## 2022-12-14 ENCOUNTER — CLINICAL DOCUMENTATION (OUTPATIENT)
Dept: ONCOLOGY | Age: 77
End: 2022-12-14

## 2022-12-14 VITALS
HEART RATE: 59 BPM | BODY MASS INDEX: 32.33 KG/M2 | HEIGHT: 67 IN | RESPIRATION RATE: 18 BRPM | TEMPERATURE: 97.7 F | WEIGHT: 206 LBS | OXYGEN SATURATION: 96 % | SYSTOLIC BLOOD PRESSURE: 146 MMHG | DIASTOLIC BLOOD PRESSURE: 68 MMHG

## 2022-12-14 DIAGNOSIS — D47.1 MYELOPROLIFERATIVE NEOPLASM (HCC): ICD-10-CM

## 2022-12-14 DIAGNOSIS — D47.1 MYELOPROLIFERATIVE NEOPLASM (HCC): Primary | ICD-10-CM

## 2022-12-14 LAB
ALBUMIN SERPL-MCNC: 4.2 GM/DL (ref 3.4–5)
ALP BLD-CCNC: 114 IU/L (ref 40–129)
ALT SERPL-CCNC: 18 U/L (ref 10–40)
ANION GAP SERPL CALCULATED.3IONS-SCNC: 9 MMOL/L (ref 4–16)
AST SERPL-CCNC: 19 IU/L (ref 15–37)
BASOPHILS ABSOLUTE: 0.5 K/CU MM
BASOPHILS RELATIVE PERCENT: 3.6 % (ref 0–1)
BILIRUB SERPL-MCNC: 1.7 MG/DL (ref 0–1)
BUN BLDV-MCNC: 10 MG/DL (ref 6–23)
CALCIUM SERPL-MCNC: 9 MG/DL (ref 8.3–10.6)
CHLORIDE BLD-SCNC: 101 MMOL/L (ref 99–110)
CO2: 28 MMOL/L (ref 21–32)
CREAT SERPL-MCNC: 1.1 MG/DL (ref 0.9–1.3)
DIFFERENTIAL TYPE: ABNORMAL
EOSINOPHILS ABSOLUTE: 0.6 K/CU MM
EOSINOPHILS RELATIVE PERCENT: 4.5 % (ref 0–3)
ERYTHROCYTE SEDIMENTATION RATE: 3 MM/HR (ref 0–20)
GFR SERPL CREATININE-BSD FRML MDRD: >60 ML/MIN/1.73M2
GLUCOSE BLD-MCNC: 117 MG/DL (ref 70–99)
HCT VFR BLD CALC: 47.4 % (ref 42–52)
HEMOGLOBIN: 15.1 GM/DL (ref 13.5–18)
LACTATE DEHYDROGENASE: 243 IU/L (ref 120–246)
LYMPHOCYTES ABSOLUTE: 1 K/CU MM
LYMPHOCYTES RELATIVE PERCENT: 7.6 % (ref 24–44)
MCH RBC QN AUTO: 26.2 PG (ref 27–31)
MCHC RBC AUTO-ENTMCNC: 31.9 % (ref 32–36)
MCV RBC AUTO: 82.3 FL (ref 78–100)
MONOCYTES ABSOLUTE: 0.7 K/CU MM
MONOCYTES RELATIVE PERCENT: 5.6 % (ref 0–4)
PDW BLD-RTO: 19.7 % (ref 11.7–14.9)
PLATELET # BLD: 224 K/CU MM (ref 140–440)
PMV BLD AUTO: 10.2 FL (ref 7.5–11.1)
POTASSIUM SERPL-SCNC: 3.9 MMOL/L (ref 3.5–5.1)
RBC # BLD: 5.76 M/CU MM (ref 4.6–6.2)
SEGMENTED NEUTROPHILS ABSOLUTE COUNT: 9.9 K/CU MM
SEGMENTED NEUTROPHILS RELATIVE PERCENT: 78.7 % (ref 36–66)
SODIUM BLD-SCNC: 138 MMOL/L (ref 135–145)
TOTAL PROTEIN: 6.5 GM/DL (ref 6.4–8.2)
WBC # BLD: 12.6 K/CU MM (ref 4–10.5)

## 2022-12-14 PROCEDURE — 36415 COLL VENOUS BLD VENIPUNCTURE: CPT

## 2022-12-14 PROCEDURE — 99211 OFF/OP EST MAY X REQ PHY/QHP: CPT

## 2022-12-14 PROCEDURE — 85025 COMPLETE CBC W/AUTO DIFF WBC: CPT

## 2022-12-14 PROCEDURE — G8417 CALC BMI ABV UP PARAM F/U: HCPCS | Performed by: INTERNAL MEDICINE

## 2022-12-14 PROCEDURE — 85652 RBC SED RATE AUTOMATED: CPT

## 2022-12-14 PROCEDURE — 99213 OFFICE O/P EST LOW 20 MIN: CPT | Performed by: INTERNAL MEDICINE

## 2022-12-14 PROCEDURE — 80053 COMPREHEN METABOLIC PANEL: CPT

## 2022-12-14 PROCEDURE — 1123F ACP DISCUSS/DSCN MKR DOCD: CPT | Performed by: INTERNAL MEDICINE

## 2022-12-14 PROCEDURE — 3074F SYST BP LT 130 MM HG: CPT | Performed by: INTERNAL MEDICINE

## 2022-12-14 PROCEDURE — 3078F DIAST BP <80 MM HG: CPT | Performed by: INTERNAL MEDICINE

## 2022-12-14 PROCEDURE — 83615 LACTATE (LD) (LDH) ENZYME: CPT

## 2022-12-14 PROCEDURE — 1036F TOBACCO NON-USER: CPT | Performed by: INTERNAL MEDICINE

## 2022-12-14 PROCEDURE — G8484 FLU IMMUNIZE NO ADMIN: HCPCS | Performed by: INTERNAL MEDICINE

## 2022-12-14 PROCEDURE — G8427 DOCREV CUR MEDS BY ELIG CLIN: HCPCS | Performed by: INTERNAL MEDICINE

## 2022-12-14 ASSESSMENT — PATIENT HEALTH QUESTIONNAIRE - PHQ9
SUM OF ALL RESPONSES TO PHQ9 QUESTIONS 1 & 2: 0
SUM OF ALL RESPONSES TO PHQ QUESTIONS 1-9: 0
1. LITTLE INTEREST OR PLEASURE IN DOING THINGS: 0
2. FEELING DOWN, DEPRESSED OR HOPELESS: 0
SUM OF ALL RESPONSES TO PHQ QUESTIONS 1-9: 0

## 2022-12-14 NOTE — PROGRESS NOTES
MA Rooming Questions  Patient: Yomaira Morales  MRN: [id number]    Date: 12/14/2022        1. Do you have any new issues?   no         2. Do you need any refills on medications?    no    3. Have you had any imaging done since your last visit?   no    4. Have you been hospitalized or seen in the emergency room since your last visit here?   no    5. Did the patient have a depression screening completed today?  Yes    PHQ-9 Total Score: 0 (12/14/2022 10:51 AM)       PHQ-9 Given to (if applicable):               PHQ-9 Score (if applicable):                     [] Positive     [x]  Negative              Does question #9 need addressed (if applicable)                     [] Yes    []  No               Gilford Spires, MA

## 2022-12-14 NOTE — PROGRESS NOTES
Patient Name: Luis Eduardo Vazquez  Patient : 1945  Patient MRN: [id number]     Primary Oncologist: Rudolph Valadez MD  Referring Provider: Savana Horton MD     Date of Service: 2022      Chief Complaint:   Chief Complaint   Patient presents with    Follow-up     Patient Active Problem List:     Recurrent deep venous thrombosis      Splenomegaly     JAK2 positive myeloproliferative neoplasm    HPI:   Luis Eduardo Vazquez is a 49-year-old pleasant gentleman with medical history significant for hypertension, GERD and recurrent VTE, currently on long term anticoagulation therapy with Xarelto, initially referred to me on 2020 for evaluation of his splenomegaly. He stated that he presented to [facility] on 10/15/20 with lower abdominal pain and constipation. He was found to have impacted stool and he was treated with soapsuds enema. His symptom resolved after he had bowel movement. Because of his history of small bowel obstruction, CT scan was ordered. It showed splenic enlargement with heterogeneous enhancement, along with wedge-shaped areas of hypo enhancement. The enlargement is nonspecific, but can be reactive to inflammation or infection elsewhere. However, neoplasm (especially lymphoma) must also be considered. The areas of wedge-shaped hypoenhancement may be secondary to the reactive process, but superimposed necrosis/infarction is more likely. He was released from hospital with an appointment to see me as an out patient. He has about 35 pound weight loss over one year duration. Laboratory work ups done on 10/16/20 showed mild leukocytosis (WBC 14.1) and JAK2 V617F mutation, consistent with JAK2 positive myeloproliferative neoplasm. The rests of the blood test, including flow cytometry, JAK2 exon 12-15, MPL, CALR, SPEP, serum immunofixation, ESR and LDH were within normal range. On 2022, he presented to me for follow up.   I xarelto since he has difficulty achiever continuous therapeutic INR. (also wasn't sure doppler done on 3/2019 showed chronic or acute DVT). He has been on xarelto since then. Hypercoagulable work ups done on 3/10/2019 showed anticardiolipin antibodies - negative, beta-2 glycoprotein antibodies - negative, protein C - 119, protein S - 75, factor V Leiden - negative, prothrombin gene mutation - negative, homocysteine - 19.1, MTHFR - heterozygous for [id number]. Past Medical History  History of DVT  Hypertension  Herpes zoster  GERD    Surgical History  Cholecystectomy   Laparoscopic appendectomy  Mandible fracture surgery    Allergies  allopurinol    Social History  Retired 12 years ago, worked at [employer]. No tob/etoh or any other illicit drug abuse. Family History  Reported that brother had a history of malignancy but was not sure what kind of malignancy. Review of Systems: \"Per interval history; otherwise 10 point ROS is negative. \"  His energy level is stable and his sleep is fine. He doesn't have fever, chills, night sweats, cough, shortness of breath, chest pain, hemoptysis or palpitations. His bowel and bladder functions are malia. He denies nausea, vomiting, abdominal pain, diarrhea, constipation, dysuria, loss of appetite or weight loss. He doesn't have neuropathy and he denies bleeding or clotting issues. He doesn't have any pain on today visit. No anxiety or depression. The rest of the systems are unremarkable.      Vital Signs:  BP (!) 146/68 (Site: Right Upper Arm, Position: Sitting, Cuff Size: Medium Adult)   Pulse 59   Temp 97.7 °F (36.5 °C) (Infrared)   Resp 18   Ht 5' 7\" (1.702 m)   Wt 206 lb (93.4 kg)   SpO2 96%   BMI 32.26 kg/m²     Physical Exam:  CONSTITUTIONAL: awake, no apparent distress, alert, cooperative,    EYES: pupils equal, sclera clear and conjunctiva normal, round and reactive to light,   ENT: without obvious abnormality, normocephalic, atraumatic  NECK: supple, symmetrical, no jugular venous distension and no carotid bruits   HEMATOLOGIC/LYMPHATIC: no cervical, supraclavicular or axillary lymphadenopathy   LUNGS: VBS, no wheezes, no increased work of breathing, no crackles, clear to auscultation, no rhonchi,   CARDIOVASCULAR: normal S1 and S2, regular rate and rhythm, no murmur noted  ABDOMEN: soft, non-distended, non-tender, normal bowel sounds x 4, no masses palpated, no hepatosplenomegaly   MUSCULOSKELETAL: full range of motion noted, tone is normal  NEUROLOGIC: awake, alert, oriented to name, place and time. Motor skills grossly intact. SKIN: Normal skin color, texture, turgor and no jaundice.  appears intact   EXTREMITIES: no clubbing, no cyanosis, no leg swelling, no LE edema,       Labs:  Hematology:  Lab Results   Component Value Date    WBC 12.6 (H) 12/14/2022    RBC 5.76 12/14/2022    HGB 15.1 12/14/2022    HCT 47.4 12/14/2022    MCV 82.3 12/14/2022    MCH 26.2 (L) 12/14/2022    MCHC 31.9 (L) 12/14/2022    RDW 19.7 (H) 12/14/2022     12/14/2022    MPV 10.2 12/14/2022    BANDSPCT 3 (L) 08/11/2020    SEGSPCT 78.7 (H) 12/14/2022    EOSRELPCT 4.5 (H) 12/14/2022    BASOPCT 3.6 (H) 12/14/2022    LYMPHOPCT 7.6 (L) 12/14/2022    MONOPCT 5.6 (H) 12/14/2022    BANDABS 0.50 08/11/2020    SEGSABS 9.9 12/14/2022    EOSABS 0.6 12/14/2022    BASOSABS 0.5 12/14/2022    LYMPHSABS 1.0 12/14/2022    MONOSABS 0.7 12/14/2022    DIFFTYPE AUTOMATED DIFFERENTIAL 12/14/2022    PLTM SEVERAL LARGE PLATELETS 87/80/9879     Lab Results   Component Value Date    ESR 3 12/14/2022     Chemistry:  Lab Results   Component Value Date     12/14/2022    K 3.9 12/14/2022     12/14/2022    CO2 28 12/14/2022    BUN 10 12/14/2022    CREATININE 1.1 12/14/2022    GLUCOSE 117 (H) 12/14/2022    CALCIUM 9.0 12/14/2022    PROT 6.5 12/14/2022    LABALBU 4.2 12/14/2022    BILITOT 1.7 (H) 12/14/2022    ALKPHOS 114 12/14/2022    AST 19 12/14/2022    ALT 18 12/14/2022    LABGLOM >60 12/14/2022 GFRAA >60 07/28/2022    MG 2.2 08/10/2020    POCGLU 124 (H) 06/02/2017     Lab Results   Component Value Date     12/14/2022    HOMOCYSTEINE 19.1 (H) 03/10/2019     No components found for: LD  Lab Results   Component Value Date    TSHHS 0.591 07/12/2018     Immunology:  Lab Results   Component Value Date    PROT 6.5 12/14/2022    SPEP  10/16/2020     INTERPRETATION - No monoclonal proteins identified. SAF    ALBUMINELP 3.4 10/16/2020    LABALPH 0.3 10/16/2020    LABALPH 0.7 10/16/2020    LABBETA 0.9 10/16/2020    GAMGLOB 0.9 10/16/2020     Lab Results   Component Value Date    KAPPAUVOL 28.82 (H) 10/16/2020    LAMBDAUVOL 19.74 10/16/2020    KLFLCR 1.46 10/16/2020     Lab Results   Component Value Date    B2M 4.1 (H) 10/16/2020     Coagulation Panel:  Lab Results   Component Value Date    PROTIME 16.2 (H) 08/10/2020    INR 1.33 08/10/2020    APTT 36.4 08/10/2020    DDIMER 837 (H) 08/10/2020     Anemia Panel:  No results found for: Arva Torres, FOLATE  Tumor Markers:  No results found for: , CEA, , LABCA2, PSA  Observations:  PHQ-9 Total Score: 0 (12/14/2022 10:51 AM)     Assessment & Plan:   Recurrent VTE  Splenomegaly  JAK2 V617F myeloproliferative neoplasm    PLAN  Andria Brannon is a 71-year-old pleasant gentleman with medical history significant for recurrent VTE, who was found to have splenomegaly when he presented with lower abdominal pain and constipation. CT scan showed splenic enlargement with heterogeneous enhancement, along with wedge-shaped areas of hypo enhancement. Laboratory work ups done on 10/16/20 showed mild leukocytosis (WBC 14.1) and JAK2 V617F mutation, consistent with JAK2 positive myeloproliferative neoplasm. The rests of the blood test, including flow cytometry, JAK2 exon 12-15, MPL, CALR, SPEP, serum immunofixation, ESR and LDH were within normal range. On December 14, 2022, he presented to me for follow up.   I have been following him for JAK2 positive myeloproliferative neoplasm and he required therapeutic phlebotomy since diagnosis. I believe his splenomegaly is due to JAK2 positive myeloproliferative neoplasm. The areas of wedge-shaped hypoenhancement are due to splenic infarction, secondary to JAK2 related hypercoagulable state. He is currently on Xarelto 20 mg daily and I added aspirin 81 mg daily since 10/30/20 to prevent both arterial and venous thromboembolic episodes. I recommend him to have therapeutic phlebotomy to keep his hematocrit less than 45%. Since his hematocrit today was 47.4%,I will request one phlebotomy. 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 However, he developed hydroxyurea induced skin rashes and we have to stop it soon after starting it. Since he could not tolerate hydroxyurea, I will continue with therapeutic phlebotomy as needed basis only. Health maintenance - I recommend exercise, low fat and low sodium diet. I answered all his questions and concerns for today. Recent imaging and labs were reviewed and discussed with the patient.

## 2023-01-06 ENCOUNTER — HOSPITAL ENCOUNTER (OUTPATIENT)
Dept: INFUSION THERAPY | Age: 78
Setting detail: INFUSION SERIES
Discharge: HOME | End: 2023-01-06
Payer: MEDICARE

## 2023-01-06 VITALS
RESPIRATION RATE: 16 BRPM | TEMPERATURE: 98.2 F | DIASTOLIC BLOOD PRESSURE: 72 MMHG | OXYGEN SATURATION: 96 % | SYSTOLIC BLOOD PRESSURE: 131 MMHG | HEART RATE: 76 BPM

## 2023-01-06 DIAGNOSIS — D47.1 MYELOPROLIFERATIVE NEOPLASM (HCC): Primary | ICD-10-CM

## 2023-01-06 PROCEDURE — 99195 PHLEBOTOMY: CPT

## 2023-01-06 PROCEDURE — 99211 OFF/OP EST MAY X REQ PHY/QHP: CPT

## 2023-01-06 NOTE — PROGRESS NOTES
Pt taken to room 01 for phlebotomy. Pt oriented to room, call light, bed/chair controls, TV, pt voiced understanding. Plan of care explained to pt, pt voiced understanding.

## 2023-01-06 NOTE — PROGRESS NOTES
Diagnosis: d45.     Pre-Phlebotomy: /63, HR 67    Post-Phlebotomy: /72, HR 76    Volume Removed: 676 grams    Complications: non3    Comments: right arm        Alexander Dobbs RN

## 2023-01-06 NOTE — DISCHARGE INSTRUCTIONS
Therapeutic Phlebotomy  Do not skip meals today. Drink plenty of fluids, especially water and rest if possible. Other Instructions  Continue home meds, diet and activity  Call your Doctor for any specific questions or problems. If you have any problems and are unable to contact your doctor, please contact one of the following:  During Business Hours call the Infusion Unit at [phone]  After Hours call [facility]: [phone] and ask for the Emergency [facility] at [phone] ext.  Lisbet Hamilton

## 2023-03-12 NOTE — PROGRESS NOTES
Patient Name: Tammy Doan  Patient : 1945  Patient MRN: [id number]     Primary Oncologist: Maria Luisa Cordova MD  Referring Provider: Bhargav Alexandre MD     Date of Service: 3/15/2023      Chief Complaint:   Chief Complaint   Patient presents with    Follow-up     Patient Active Problem List:     Recurrent deep venous thrombosis      Splenomegaly     JAK2 positive myeloproliferative neoplasm    HPI:   Tammy Doan is a 66-year-old pleasant gentleman with medical history significant for hypertension, GERD and recurrent VTE, currently on long term anticoagulation therapy with Xarelto, initially referred to me on 2020 for evaluation of his splenomegaly. He stated that he presented to [facility] on 10/15/20 with lower abdominal pain and constipation. He was found to have impacted stool and he was treated with soapsuds enema. His symptom resolved after he had bowel movement. Because of his history of small bowel obstruction, CT scan was ordered. It showed splenic enlargement with heterogeneous enhancement, along with wedge-shaped areas of hypo enhancement. The enlargement is nonspecific, but can be reactive to inflammation or infection elsewhere. However, neoplasm (especially lymphoma) must also be considered. The areas of wedge-shaped hypoenhancement may be secondary to the reactive process, but superimposed necrosis/infarction is more likely. He was released from hospital with an appointment to see me as an out patient. He has about 35 pound weight loss over one year duration. Laboratory work ups done on 10/16/20 showed mild leukocytosis (WBC 14.1) and JAK2 V617F mutation, consistent with JAK2 positive myeloproliferative neoplasm. The rests of the blood test, including flow cytometry, JAK2 exon 12-15, MPL, CALR, SPEP, serum immunofixation, ESR and LDH were within normal range. On March 15, 2023, he presented to me for follow up.   I have been following him for JAK2 positive myeloproliferative neoplasm and he required therapeutic phlebotomy since diagnosis. I believe his splenomegaly is due to JAK2 positive myeloproliferative neoplasm. The areas of wedge-shaped hypoenhancement are due to splenic infarction, secondary to JAK2 related hypercoagulable state. He is currently on Xarelto 20 mg daily and I added aspirin 81 mg daily since 10/30/20 to prevent both arterial and venous thromboembolic episodes. I recommend him to have therapeutic phlebotomy to keep his hematocrit less than 45%. Since his hematocrit today was 44.5%, he doesn't need phlebotomy this time. 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 However, he developed hydroxyurea induced skin rashes and we have to stop it soon after starting it. Since he could not tolerate hydroxyurea, I will continue with therapeutic phlebotomy as needed basis only. Health maintenance - I recommend exercise, low fat and low sodium diet. He doesn't have any significant symptoms at today visit. Patient's previous history of VTE  He reported that he hit his left leg to the side of his truck and then developed a LLE DVT in 2014 when he was placed on Coumadin. He was on it for 6 months. It looks like he developed RLE(does not remember any provoking factors) in 2015 when he was placed on coumadin which was switched to Xarelto prior to his PCP retired. Reported that in November 2017 he was off of Xarelto for 7 days for teeth extraction. Developed RLE DVT. Xarelto resumed and and has been very compliant with it since. On March 3, 2019, he presented with right calf pain and swelling. Doppler showed not compressible in the region of popliteal vein and distal femoral vein which suggests DVT. He was switched to heparin, transition to coumadin.  Later on, he was switched back to xarelto since he has difficulty achiever continuous therapeutic INR. (also wasn't sure doppler done on 3/2019 showed chronic or acute DVT). He has been on xarelto since then. 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 Past Medical History  History of DVT  Hypertension  Herpes zoster  GERD    Surgical History  Cholecystectomy   Laparoscopic appendectomy  Mandible fracture surgery    Allergies  allopurinol    Social History  Retired 12 years ago, worked at [employer]. No tob/etoh or any other illicit drug abuse. Family History  Reported that brother had a history of malignancy but was not sure what kind of malignancy. Review of Systems: \"Per interval history; otherwise 10 point ROS is negative. \"  His energy level is pretty good today and his sleep is fine. He doesn't have fever, chills, night sweats, cough, shortness of breath, chest pain, hemoptysis or palpitations. His bowel and bladder functions are malia. He denies nausea, vomiting, abdominal pain, diarrhea, constipation, dysuria, loss of appetite or weight loss. He doesn't have neuropathy and he denies bleeding or clotting issues. He denies any pain on today visit. No anxiety or depression. The rest of the systems are unremarkable.      Vital Signs:  BP (!) 144/67 (Site: Right Upper Arm, Position: Sitting, Cuff Size: Medium Adult)   Pulse 60   Temp 97.7 °F (36.5 °C) (Infrared)   Ht 5' 7\" (1.702 m)   Wt 213 lb 6.4 oz (96.8 kg)   SpO2 98%   BMI 33.42 kg/m²     Physical Exam:  CONSTITUTIONAL: awake, no apparent distress, alert, cooperative,    EYES: pupils equal, sclera clear and conjunctiva normal, round and reactive to light,   ENT: without obvious abnormality, normocephalic, atraumatic  NECK: supple, symmetrical, no jugular venous distension and no carotid bruits   HEMATOLOGIC/LYMPHATIC: no cervical, supraclavicular or axillary lymphadenopathy   LUNGS: VBS, no wheezes, clear to auscultation, no rhonchi, no increased work of breathing, no crackles,   CARDIOVASCULAR: normal S1 and S2, regular rate and rhythm, no murmur noted  ABDOMEN: soft, non-distended, non-tender, normal bowel sounds x 4, no masses palpated, no hepatosplenomegaly   MUSCULOSKELETAL: full range of motion noted, tone is normal  NEUROLOGIC: awake, alert, oriented to name, place and time. Motor skills grossly intact. SKIN: Normal skin color, texture, turgor and no jaundice.  appears intact   EXTREMITIES: no cyanosis, no leg swelling, no clubbing, no LE edema,       Labs:  Hematology:  Lab Results   Component Value Date    WBC 12.2 (H) 03/15/2023    RBC 5.33 03/15/2023    HGB 14.4 03/15/2023    HCT 44.5 03/15/2023    MCV 83.5 03/15/2023    MCH 27.0 03/15/2023    MCHC 32.4 03/15/2023    RDW 17.3 (H) 03/15/2023     03/15/2023    MPV 10.4 03/15/2023    BANDSPCT 3 (L) 08/11/2020    SEGSPCT 77.5 (H) 03/15/2023    EOSRELPCT 4.4 (H) 03/15/2023    BASOPCT 3.7 (H) 03/15/2023    LYMPHOPCT 8.8 (L) 03/15/2023    MONOPCT 5.6 (H) 03/15/2023    BANDABS 0.50 08/11/2020    SEGSABS 9.5 03/15/2023    EOSABS 0.5 03/15/2023    BASOSABS 0.5 03/15/2023    LYMPHSABS 1.1 03/15/2023    MONOSABS 0.7 03/15/2023    DIFFTYPE AUTOMATED DIFFERENTIAL 03/15/2023    PLTM SEVERAL LARGE PLATELETS 46/59/6194     Lab Results   Component Value Date    ESR 3 12/14/2022     Chemistry:  Lab Results   Component Value Date     12/14/2022    K 3.9 12/14/2022     12/14/2022    CO2 28 12/14/2022    BUN 10 12/14/2022    CREATININE 1.1 12/14/2022    GLUCOSE 117 (H) 12/14/2022    CALCIUM 9.0 12/14/2022    PROT 6.5 12/14/2022    LABALBU 4.2 12/14/2022    BILITOT 1.7 (H) 12/14/2022    ALKPHOS 114 12/14/2022    AST 19 12/14/2022    ALT 18 12/14/2022    LABGLOM >60 12/14/2022    GFRAA >60 07/28/2022    MG 2.2 08/10/2020    POCGLU 124 (H) 06/02/2017     Lab Results   Component Value Date     12/14/2022    HOMOCYSTEINE 19.1 (H) 03/10/2019     No results found for: LD  Lab Results   Component Value Date    TSHHS 0.591 07/12/2018     Immunology:  Lab Results   Component Value Date    PROT 6.5 12/14/2022    SPEP  10/16/2020     INTERPRETATION - No monoclonal proteins identified. SAF    ALBUMINELP 3.4 10/16/2020    LABALPH 0.3 10/16/2020    LABALPH 0.7 10/16/2020    LABBETA 0.9 10/16/2020    GAMGLOB 0.9 10/16/2020     Lab Results   Component Value Date    KAPPAUVOL 28.82 (H) 10/16/2020    LAMBDAUVOL 19.74 10/16/2020    KLFLCR 1.46 10/16/2020     Lab Results   Component Value Date    B2M 4.1 (H) 10/16/2020     Coagulation Panel:  Lab Results   Component Value Date    PROTIME 16.2 (H) 08/10/2020    INR 1.33 08/10/2020    APTT 36.4 08/10/2020    DDIMER 837 (H) 08/10/2020     Anemia Panel:  No results found for: [id number], FOLATE  Tumor Markers:  No results found for: , CEA, , LABCA2, PSA  Observations:  No data recorded     Assessment & Plan:   Recurrent VTE  Splenomegaly  JAK2 V617F myeloproliferative neoplasm    PLAN  Elen Cox is a 72-year-old pleasant gentleman with medical history significant for recurrent VTE, who was found to have splenomegaly when he presented with lower abdominal pain and constipation. CT scan showed splenic enlargement with heterogeneous enhancement, along with wedge-shaped areas of hypo enhancement. Laboratory work ups done on 10/16/20 showed mild leukocytosis (WBC 14.1) and JAK2 V617F mutation, consistent with JAK2 positive myeloproliferative neoplasm. The rests of the blood test, including flow cytometry, JAK2 exon 12-15, MPL, CALR, SPEP, serum immunofixation, ESR and LDH were within normal range. On March 15, 2023, he presented to me for follow up.   I have been following him for JAK2 positive myeloproliferative neoplasm and he required therapeutic phlebotomy since diagnosis. I believe his splenomegaly is due to JAK2 positive myeloproliferative neoplasm. The areas of wedge-shaped hypoenhancement are due to splenic infarction, secondary to JAK2 related hypercoagulable state. He is currently on Xarelto 20 mg daily and I added aspirin 81 mg daily since 10/30/20 to prevent both arterial and venous thromboembolic episodes. I recommend him to have therapeutic phlebotomy to keep his hematocrit less than 45%. Since his hematocrit today was 44.5%, he doesn't need phlebotomy this time. 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 However, he developed hydroxyurea induced skin rashes and we have to stop it soon after starting it. Since he could not tolerate hydroxyurea, I will continue with therapeutic phlebotomy as needed basis only. Health maintenance - I recommend exercise, low fat and low sodium diet. I answered all his questions and concerns for today. Recent imaging and labs were reviewed and discussed with the patient.

## 2023-03-15 ENCOUNTER — OFFICE VISIT (OUTPATIENT)
Dept: ONCOLOGY | Age: 78
End: 2023-03-15
Payer: MEDICARE

## 2023-03-15 ENCOUNTER — HOSPITAL ENCOUNTER (OUTPATIENT)
Dept: INFUSION THERAPY | Age: 78
Discharge: HOME | End: 2023-03-15
Payer: MEDICARE

## 2023-03-15 VITALS
WEIGHT: 213.4 LBS | OXYGEN SATURATION: 98 % | BODY MASS INDEX: 33.49 KG/M2 | TEMPERATURE: 97.7 F | SYSTOLIC BLOOD PRESSURE: 144 MMHG | DIASTOLIC BLOOD PRESSURE: 67 MMHG | HEART RATE: 60 BPM | HEIGHT: 67 IN

## 2023-03-15 DIAGNOSIS — D47.1 MYELOPROLIFERATIVE NEOPLASM (HCC): ICD-10-CM

## 2023-03-15 DIAGNOSIS — D47.1 MYELOPROLIFERATIVE NEOPLASM (HCC): Primary | ICD-10-CM

## 2023-03-15 LAB
BASOPHILS ABSOLUTE: 0.5 K/CU MM
BASOPHILS RELATIVE PERCENT: 3.7 % (ref 0–1)
DIFFERENTIAL TYPE: ABNORMAL
EOSINOPHILS ABSOLUTE: 0.5 K/CU MM
EOSINOPHILS RELATIVE PERCENT: 4.4 % (ref 0–3)
HCT VFR BLD CALC: 44.5 % (ref 42–52)
HEMOGLOBIN: 14.4 GM/DL (ref 13.5–18)
LYMPHOCYTES ABSOLUTE: 1.1 K/CU MM
LYMPHOCYTES RELATIVE PERCENT: 8.8 % (ref 24–44)
MCH RBC QN AUTO: 27 PG (ref 27–31)
MCHC RBC AUTO-ENTMCNC: 32.4 % (ref 32–36)
MCV RBC AUTO: 83.5 FL (ref 78–100)
MONOCYTES ABSOLUTE: 0.7 K/CU MM
MONOCYTES RELATIVE PERCENT: 5.6 % (ref 0–4)
PDW BLD-RTO: 17.3 % (ref 11.7–14.9)
PLATELET # BLD: 247 K/CU MM (ref 140–440)
PMV BLD AUTO: 10.4 FL (ref 7.5–11.1)
RBC # BLD: 5.33 M/CU MM (ref 4.6–6.2)
SEGMENTED NEUTROPHILS ABSOLUTE COUNT: 9.5 K/CU MM
SEGMENTED NEUTROPHILS RELATIVE PERCENT: 77.5 % (ref 36–66)
WBC # BLD: 12.2 K/CU MM (ref 4–10.5)

## 2023-03-15 PROCEDURE — 1123F ACP DISCUSS/DSCN MKR DOCD: CPT | Performed by: INTERNAL MEDICINE

## 2023-03-15 PROCEDURE — 36415 COLL VENOUS BLD VENIPUNCTURE: CPT

## 2023-03-15 PROCEDURE — 3078F DIAST BP <80 MM HG: CPT | Performed by: INTERNAL MEDICINE

## 2023-03-15 PROCEDURE — 99213 OFFICE O/P EST LOW 20 MIN: CPT | Performed by: INTERNAL MEDICINE

## 2023-03-15 PROCEDURE — 85025 COMPLETE CBC W/AUTO DIFF WBC: CPT

## 2023-03-15 PROCEDURE — 3077F SYST BP >= 140 MM HG: CPT | Performed by: INTERNAL MEDICINE

## 2023-03-15 PROCEDURE — 99211 OFF/OP EST MAY X REQ PHY/QHP: CPT

## 2023-03-15 NOTE — PROGRESS NOTES
MA Rooming Questions  Patient: Roxane Lopez  MRN: [id number]    Date: 3/15/2023        1. Do you have any new issues?   no         2. Do you need any refills on medications?    no    3. Have you had any imaging done since your last visit?   no    4. Have you been hospitalized or seen in the emergency room since your last visit here?   no    5. Did the patient have a depression screening completed today?  No    No data recorded     PHQ-9 Given to (if applicable):               PHQ-9 Score (if applicable):                     [] Positive     []  Negative              Does question #9 need addressed (if applicable)                     [] Yes    []  No               Crystal Loza CMA

## 2023-03-16 DIAGNOSIS — G60.9 IDIOPATHIC PERIPHERAL NEUROPATHY: Primary | ICD-10-CM

## 2023-03-27 ENCOUNTER — PROCEDURE VISIT (OUTPATIENT)
Dept: NEUROLOGY | Age: 78
End: 2023-03-27

## 2023-03-27 DIAGNOSIS — M19.042 PRIMARY OSTEOARTHRITIS OF BOTH HANDS: ICD-10-CM

## 2023-03-27 DIAGNOSIS — M19.041 PRIMARY OSTEOARTHRITIS OF BOTH HANDS: ICD-10-CM

## 2023-03-27 DIAGNOSIS — G56.03 CARPAL TUNNEL SYNDROME, BILATERAL UPPER LIMBS: Primary | ICD-10-CM

## 2023-03-27 NOTE — PROGRESS NOTES
EMG    Risks and benefits of study discussed.    Specific and common risks of pain and bleeding as well as uncommon side effects of infection, hematoma and vasovagal episodes    Patient agreeable to testing and consents to such.    Clinical: Occasional, but infrequent aches and pains of the hands, stiffness, limited range of motion.  Very sporadic paresthesias, not consistently.    Motor NCS:  Median amplitudes normal bilateral, latencies are both borderline, velocities are normal  Ulnar amplitudes, latencies and velocities normal bilateral    Sensory NCS:  Median amplitudes are normal, latency mildly prolonged left greater than right  Ulnar amplitudes and latencies normal bilateral  Right radial amplitude and latency normal    Needle EMG: Normal findings throughout both upper limbs.    Impression:  #1 very mild median neuropathies at the wrist, left greater than right of indeterminate/doubtful clinical significance, given lack of significant carpal tunnel syndrome symptoms.  Most hand symptoms appear attributable to multiple degenerative arthritis issues involving the hand and finger joints.  #2.  No evidence of cervical radiculopathy, brachial plexopathy, generalized peripheral neuropathy affecting the upper limbs.

## 2023-06-15 ENCOUNTER — HOSPITAL ENCOUNTER (OUTPATIENT)
Dept: INFUSION THERAPY | Age: 78
Discharge: HOME | End: 2023-06-15
Payer: MEDICARE

## 2023-06-15 DIAGNOSIS — D47.1 MYELOPROLIFERATIVE NEOPLASM (HCC): ICD-10-CM

## 2023-06-15 LAB
BASOPHILS ABSOLUTE: 0.5 K/CU MM
BASOPHILS RELATIVE PERCENT: 3.4 % (ref 0–1)
DIFFERENTIAL TYPE: ABNORMAL
EOSINOPHILS ABSOLUTE: 0.7 K/CU MM
EOSINOPHILS RELATIVE PERCENT: 4.9 % (ref 0–3)
HCT VFR BLD CALC: 45.7 % (ref 42–52)
HEMOGLOBIN: 15 GM/DL (ref 13.5–18)
LYMPHOCYTES ABSOLUTE: 1.2 K/CU MM
LYMPHOCYTES RELATIVE PERCENT: 8.2 % (ref 24–44)
MCH RBC QN AUTO: 27.4 PG (ref 27–31)
MCHC RBC AUTO-ENTMCNC: 32.8 % (ref 32–36)
MCV RBC AUTO: 83.5 FL (ref 78–100)
MONOCYTES ABSOLUTE: 0.7 K/CU MM
MONOCYTES RELATIVE PERCENT: 5.2 % (ref 0–4)
PDW BLD-RTO: 17.8 % (ref 11.7–14.9)
PLATELET # BLD: 304 K/CU MM (ref 140–440)
PMV BLD AUTO: 9.7 FL (ref 7.5–11.1)
RBC # BLD: 5.47 M/CU MM (ref 4.6–6.2)
SEGMENTED NEUTROPHILS ABSOLUTE COUNT: 10.9 K/CU MM
SEGMENTED NEUTROPHILS RELATIVE PERCENT: 78.3 % (ref 36–66)
WBC # BLD: 14 K/CU MM (ref 4–10.5)

## 2023-06-15 PROCEDURE — 85025 COMPLETE CBC W/AUTO DIFF WBC: CPT

## 2023-06-15 PROCEDURE — 36415 COLL VENOUS BLD VENIPUNCTURE: CPT

## 2023-06-15 PROCEDURE — 99211 OFF/OP EST MAY X REQ PHY/QHP: CPT

## 2023-09-01 ENCOUNTER — HOSPITAL ENCOUNTER (OUTPATIENT)
Dept: INFUSION THERAPY | Age: 78
Discharge: HOME | End: 2023-09-01
Payer: MEDICARE

## 2023-09-01 ENCOUNTER — OFFICE VISIT (OUTPATIENT)
Dept: ONCOLOGY | Age: 78
End: 2023-09-01
Payer: MEDICARE

## 2023-09-01 VITALS
OXYGEN SATURATION: 94 % | BODY MASS INDEX: 31.3 KG/M2 | DIASTOLIC BLOOD PRESSURE: 69 MMHG | TEMPERATURE: 98.7 F | HEART RATE: 94 BPM | WEIGHT: 199.4 LBS | SYSTOLIC BLOOD PRESSURE: 157 MMHG | HEIGHT: 67 IN

## 2023-09-01 DIAGNOSIS — D47.1 MYELOPROLIFERATIVE NEOPLASM (HCC): Primary | ICD-10-CM

## 2023-09-01 DIAGNOSIS — D47.1 MYELOPROLIFERATIVE NEOPLASM (HCC): ICD-10-CM

## 2023-09-01 LAB
ANISOCYTOSIS: ABNORMAL
BANDED NEUTROPHILS ABSOLUTE COUNT: 0.26 K/CU MM
BANDED NEUTROPHILS RELATIVE PERCENT: 2 % (ref 5–11)
BASOPHILS ABSOLUTE: 0.8 K/CU MM
BASOPHILS RELATIVE PERCENT: 6 % (ref 0–1)
DIFFERENTIAL TYPE: ABNORMAL
EOSINOPHILS ABSOLUTE: 0.5 K/CU MM
EOSINOPHILS RELATIVE PERCENT: 4 % (ref 0–3)
HCT VFR BLD CALC: 45.7 % (ref 42–52)
HEMOGLOBIN: 15.1 GM/DL (ref 13.5–18)
LYMPHOCYTES ABSOLUTE: 0.5 K/CU MM
LYMPHOCYTES RELATIVE PERCENT: 4 % (ref 24–44)
MCH RBC QN AUTO: 27.9 PG (ref 27–31)
MCHC RBC AUTO-ENTMCNC: 33 % (ref 32–36)
MCV RBC AUTO: 84.5 FL (ref 78–100)
METAMYELOCYTES ABSOLUTE COUNT: 0.13 K/CU MM
METAMYELOCYTES PERCENT: 1 %
MONOCYTES ABSOLUTE: 0.5 K/CU MM
MONOCYTES RELATIVE PERCENT: 4 % (ref 0–4)
PDW BLD-RTO: 18.7 % (ref 11.7–14.9)
PLATELET # BLD: 322 K/CU MM (ref 140–440)
PLT MORPHOLOGY: ABNORMAL
PMV BLD AUTO: 10 FL (ref 7.5–11.1)
RBC # BLD: 5.41 M/CU MM (ref 4.6–6.2)
RBC # BLD: ABNORMAL 10*6/UL
SEGMENTED NEUTROPHILS ABSOLUTE COUNT: 10.2 K/CU MM
SEGMENTED NEUTROPHILS RELATIVE PERCENT: 79 % (ref 36–66)
WBC # BLD: 12.9 K/CU MM (ref 4–10.5)

## 2023-09-01 PROCEDURE — 1123F ACP DISCUSS/DSCN MKR DOCD: CPT | Performed by: INTERNAL MEDICINE

## 2023-09-01 PROCEDURE — 99211 OFF/OP EST MAY X REQ PHY/QHP: CPT

## 2023-09-01 PROCEDURE — 85027 COMPLETE CBC AUTOMATED: CPT

## 2023-09-01 PROCEDURE — 3078F DIAST BP <80 MM HG: CPT | Performed by: INTERNAL MEDICINE

## 2023-09-01 PROCEDURE — 36415 COLL VENOUS BLD VENIPUNCTURE: CPT

## 2023-09-01 PROCEDURE — 85007 BL SMEAR W/DIFF WBC COUNT: CPT

## 2023-09-01 PROCEDURE — 3077F SYST BP >= 140 MM HG: CPT | Performed by: INTERNAL MEDICINE

## 2023-09-01 PROCEDURE — 99213 OFFICE O/P EST LOW 20 MIN: CPT | Performed by: INTERNAL MEDICINE

## 2023-09-01 RX ORDER — CARBAMAZEPINE 200 MG/1
TABLET ORAL
COMMUNITY
Start: 2023-06-29

## 2023-09-01 RX ORDER — ASPIRIN 81 MG/1
81 TABLET ORAL DAILY
COMMUNITY

## 2023-09-01 RX ORDER — INDAPAMIDE 2.5 MG/1
TABLET ORAL
COMMUNITY
Start: 2023-08-26

## 2023-09-01 RX ORDER — CHOLECALCIFEROL (VITAMIN D3) 1250 MCG
CAPSULE ORAL
COMMUNITY
Start: 2023-06-06

## 2023-09-01 RX ORDER — PRAMIPEXOLE DIHYDROCHLORIDE 1 MG/1
2 TABLET ORAL
COMMUNITY
Start: 2023-07-09

## 2023-09-01 RX ORDER — TRAMADOL HYDROCHLORIDE 50 MG/1
TABLET ORAL
COMMUNITY
Start: 2023-08-31

## 2023-09-01 ASSESSMENT — PATIENT HEALTH QUESTIONNAIRE - PHQ9
SUM OF ALL RESPONSES TO PHQ9 QUESTIONS 1 & 2: 0
SUM OF ALL RESPONSES TO PHQ QUESTIONS 1-9: 0
2. FEELING DOWN, DEPRESSED OR HOPELESS: 0
1. LITTLE INTEREST OR PLEASURE IN DOING THINGS: 0
SUM OF ALL RESPONSES TO PHQ QUESTIONS 1-9: 0

## 2023-09-01 NOTE — PROGRESS NOTES
MA Rooming Questions  Patient: Sharon Rincon  MRN: [id number]    Date: 9/1/2023        1. Do you have any new issues?   no         2. Do you need any refills on medications?    no    3. Have you had any imaging done since your last visit?   no    4. Have you been hospitalized or seen in the emergency room since your last visit here?   no    5. Did the patient have a depression screening completed today?  Yes    PHQ-9 Total Score: 0 (9/1/2023  2:59 PM)       PHQ-9 Given to (if applicable):               PHQ-9 Score (if applicable):                     [] Positive     []  Negative              Does question #9 need addressed (if applicable)                     [] Yes    []  No               Pako Chavis CMA
cooperative,    EYES: pupils equal, sclera clear and conjunctiva normal, round and reactive to light,   ENT: without obvious abnormality, normocephalic, atraumatic  NECK: supple, symmetrical, no jugular venous distension and no carotid bruits   HEMATOLOGIC/LYMPHATIC: no cervical, supraclavicular or axillary lymphadenopathy   LUNGS: VBS, no wheezes, no increased work of breathing, no crackles, clear to auscultation, no rhonchi,   CARDIOVASCULAR: normal S1 and S2, regular rate and rhythm, no murmur noted  ABDOMEN: soft, non-distended, non-tender, normal bowel sounds x 4, no masses palpated, no hepatosplenomegaly   MUSCULOSKELETAL: full range of motion noted, tone is normal  NEUROLOGIC: awake, alert, oriented to name, place and time. Motor skills grossly intact. SKIN: Normal skin color, texture, turgor and no jaundice.  appears intact   EXTREMITIES: no cyanosis, no leg swelling, no clubbing, no LE edema,       Labs:  Hematology:  Lab Results   Component Value Date    WBC 12.9 (H) 09/01/2023    RBC 5.41 09/01/2023    HGB 15.1 09/01/2023    HCT 45.7 09/01/2023    MCV 84.5 09/01/2023    MCH 27.9 09/01/2023    MCHC 33.0 09/01/2023    RDW 18.7 (H) 09/01/2023     09/01/2023    MPV 10.0 09/01/2023    BANDSPCT 3 (L) 08/11/2020    SEGSPCT 78.3 (H) 06/15/2023    EOSRELPCT 4.9 (H) 06/15/2023    BASOPCT 3.4 (H) 06/15/2023    LYMPHOPCT 8.2 (L) 06/15/2023    MONOPCT 5.2 (H) 06/15/2023    BANDABS 0.50 08/11/2020    SEGSABS 10.9 06/15/2023    EOSABS 0.7 06/15/2023    BASOSABS 0.5 06/15/2023    LYMPHSABS 1.2 06/15/2023    MONOSABS 0.7 06/15/2023    DIFFTYPE AUTOMATED DIFFERENTIAL 06/15/2023    PLTM SEVERAL LARGE PLATELETS 62/62/2913     Lab Results   Component Value Date    ESR 3 12/14/2022     Chemistry:  Lab Results   Component Value Date     12/14/2022    K 3.9 12/14/2022     12/14/2022    CO2 28 12/14/2022    BUN 10 12/14/2022    CREATININE 1.1 12/14/2022    GLUCOSE 117 (H) 12/14/2022    CALCIUM 9.0 12/14/2022

## 2023-11-20 ENCOUNTER — PROCEDURE VISIT (OUTPATIENT)
Dept: NEUROLOGY | Age: 78
End: 2023-11-20
Payer: MEDICARE

## 2023-11-20 DIAGNOSIS — G62.9 NEUROPATHY: Primary | ICD-10-CM

## 2023-11-20 PROCEDURE — 95910 NRV CNDJ TEST 7-8 STUDIES: CPT | Performed by: PHYSICAL MEDICINE & REHABILITATION

## 2023-11-20 PROCEDURE — 95886 MUSC TEST DONE W/N TEST COMP: CPT | Performed by: PHYSICAL MEDICINE & REHABILITATION

## 2023-11-20 NOTE — PROGRESS NOTES
EMG    Risks and benefits of study discussed. Specific and common risks of pain and bleeding as well as uncommon side effects of infection, hematoma and vasovagal episodes    Patient agreeable to testing and consents to such. Clinical: Several years of paresthesias in the feet, more on the right. Remote history of lumbar surgery. Motor NCS:  Moderate to severely depressed amplitudes of bilateral tibial and peroneal responses, latencies normal, conduction velocities mildly slow    Sensory NCS:  Absent sural and peroneal responses bilateral    Needle EMG: Mild enlargement of distal motor units at the ankle segments, otherwise normal.    Impression:  #1.  Mild to moderate, chronic generalized peripheral neuropathy affecting lower extremities. #2. No prominent suggestions of focal or proximal lesion such as radiculopathy, plexopathy or mononeuropathy. However, generalized peripheral neuropathy makes the sensitivity of detecting such lesions reduced.

## 2023-11-26 ENCOUNTER — APPOINTMENT (OUTPATIENT)
Dept: ULTRASOUND IMAGING | Age: 78
DRG: 176 | End: 2023-11-26
Payer: MEDICARE

## 2023-11-26 ENCOUNTER — APPOINTMENT (OUTPATIENT)
Dept: CT IMAGING | Age: 78
DRG: 176 | End: 2023-11-26
Payer: MEDICARE

## 2023-11-26 ENCOUNTER — HOSPITAL ENCOUNTER (INPATIENT)
Age: 78
LOS: 2 days | Discharge: HOME | DRG: 176 | End: 2023-11-28
Attending: STUDENT IN AN ORGANIZED HEALTH CARE EDUCATION/TRAINING PROGRAM | Admitting: STUDENT IN AN ORGANIZED HEALTH CARE EDUCATION/TRAINING PROGRAM
Payer: MEDICARE

## 2023-11-26 ENCOUNTER — APPOINTMENT (OUTPATIENT)
Dept: GENERAL RADIOLOGY | Age: 78
DRG: 176 | End: 2023-11-26
Payer: MEDICARE

## 2023-11-26 DIAGNOSIS — D47.1 MYELOPROLIFERATIVE NEOPLASM (HCC): ICD-10-CM

## 2023-11-26 DIAGNOSIS — I82.409 RECURRENT DEEP VENOUS THROMBOSIS (HCC): ICD-10-CM

## 2023-11-26 DIAGNOSIS — I26.94 MULTIPLE SUBSEGMENTAL PULMONARY EMBOLI WITHOUT ACUTE COR PULMONALE (HCC): Primary | ICD-10-CM

## 2023-11-26 DIAGNOSIS — M79.2 NEUROPATHIC PAIN: ICD-10-CM

## 2023-11-26 PROBLEM — I26.99 BILATERAL PULMONARY EMBOLISM (HCC): Status: ACTIVE | Noted: 2023-11-26

## 2023-11-26 LAB
ALBUMIN SERPL-MCNC: 4.1 GM/DL (ref 3.4–5)
ALP BLD-CCNC: 118 IU/L (ref 40–129)
ALT SERPL-CCNC: 15 U/L (ref 10–40)
ANION GAP SERPL CALCULATED.3IONS-SCNC: 12 MMOL/L (ref 4–16)
ANTI-XA UNFRAC HEPARIN: 0.27 IU/ML (ref 0.3–0.7)
ANTI-XA UNFRAC HEPARIN: <0.1 IU/ML (ref 0.3–0.7)
APTT: 39.9 SECONDS (ref 25.1–37.1)
AST SERPL-CCNC: 15 IU/L (ref 15–37)
BASOPHILS ABSOLUTE: 0.2 K/CU MM
BASOPHILS RELATIVE PERCENT: 1.3 % (ref 0–1)
BILIRUB SERPL-MCNC: 3.2 MG/DL (ref 0–1)
BUN SERPL-MCNC: 14 MG/DL (ref 6–23)
CALCIUM SERPL-MCNC: 8.8 MG/DL (ref 8.3–10.6)
CHLORIDE BLD-SCNC: 98 MMOL/L (ref 99–110)
CO2: 27 MMOL/L (ref 21–32)
CREAT SERPL-MCNC: 1 MG/DL (ref 0.9–1.3)
D DIMER: 3.72 UG/ML (FEU)
DIFFERENTIAL TYPE: ABNORMAL
EKG ATRIAL RATE: 101 BPM
EKG DIAGNOSIS: NORMAL
EKG P AXIS: 64 DEGREES
EKG P-R INTERVAL: 212 MS
EKG Q-T INTERVAL: 372 MS
EKG QRS DURATION: 92 MS
EKG QTC CALCULATION (BAZETT): 482 MS
EKG R AXIS: 258 DEGREES
EKG T AXIS: 24 DEGREES
EKG VENTRICULAR RATE: 101 BPM
EOSINOPHILS ABSOLUTE: 0.3 K/CU MM
EOSINOPHILS RELATIVE PERCENT: 2 % (ref 0–3)
GFR SERPL CREATININE-BSD FRML MDRD: >60 ML/MIN/1.73M2
GLUCOSE SERPL-MCNC: 169 MG/DL (ref 70–99)
HCT VFR BLD CALC: 45.4 % (ref 42–52)
HEMOGLOBIN: 14.7 GM/DL (ref 13.5–18)
IMMATURE NEUTROPHIL %: 2.5 % (ref 0–0.43)
INR BLD: 1.2 INDEX
LYMPHOCYTES ABSOLUTE: 0.5 K/CU MM
LYMPHOCYTES RELATIVE PERCENT: 3.5 % (ref 24–44)
MAGNESIUM: 1.6 MG/DL (ref 1.8–2.4)
MCH RBC QN AUTO: 28.8 PG (ref 27–31)
MCHC RBC AUTO-ENTMCNC: 32.4 % (ref 32–36)
MCV RBC AUTO: 89 FL (ref 78–100)
MONOCYTES ABSOLUTE: 1 K/CU MM
MONOCYTES RELATIVE PERCENT: 6.5 % (ref 0–4)
NUCLEATED RBC %: 0 %
PDW BLD-RTO: 16.4 % (ref 11.7–14.9)
PLATELET # BLD: 137 K/CU MM (ref 140–440)
PMV BLD AUTO: 10.7 FL (ref 7.5–11.1)
POTASSIUM SERPL-SCNC: 3.5 MMOL/L (ref 3.5–5.1)
PRO-BNP: 7114 PG/ML
PROTHROMBIN TIME: 15.4 SECONDS (ref 11.7–14.5)
RBC # BLD: 5.1 M/CU MM (ref 4.6–6.2)
SEGMENTED NEUTROPHILS ABSOLUTE COUNT: 12.6 K/CU MM
SEGMENTED NEUTROPHILS RELATIVE PERCENT: 84.2 % (ref 36–66)
SODIUM BLD-SCNC: 137 MMOL/L (ref 135–145)
TOTAL IMMATURE NEUTOROPHIL: 0.37 K/CU MM
TOTAL NUCLEATED RBC: 0 K/CU MM
TOTAL PROTEIN: 6.7 GM/DL (ref 6.4–8.2)
TROPONIN, HIGH SENSITIVITY: 15 NG/L (ref 0–22)
TROPONIN, HIGH SENSITIVITY: 15 NG/L (ref 0–22)
WBC # BLD: 15 K/CU MM (ref 4–10.5)

## 2023-11-26 PROCEDURE — 99285 EMERGENCY DEPT VISIT HI MDM: CPT

## 2023-11-26 PROCEDURE — 85610 PROTHROMBIN TIME: CPT

## 2023-11-26 PROCEDURE — 85520 HEPARIN ASSAY: CPT

## 2023-11-26 PROCEDURE — 93970 EXTREMITY STUDY: CPT

## 2023-11-26 PROCEDURE — 1200000000 HC SEMI PRIVATE

## 2023-11-26 PROCEDURE — 84484 ASSAY OF TROPONIN QUANT: CPT

## 2023-11-26 PROCEDURE — 85379 FIBRIN DEGRADATION QUANT: CPT

## 2023-11-26 PROCEDURE — 85025 COMPLETE CBC W/AUTO DIFF WBC: CPT

## 2023-11-26 PROCEDURE — 85730 THROMBOPLASTIN TIME PARTIAL: CPT

## 2023-11-26 PROCEDURE — 71275 CT ANGIOGRAPHY CHEST: CPT

## 2023-11-26 PROCEDURE — 80053 COMPREHEN METABOLIC PANEL: CPT

## 2023-11-26 PROCEDURE — 6370000000 HC RX 637 (ALT 250 FOR IP): Performed by: STUDENT IN AN ORGANIZED HEALTH CARE EDUCATION/TRAINING PROGRAM

## 2023-11-26 PROCEDURE — 93010 ELECTROCARDIOGRAM REPORT: CPT | Performed by: INTERNAL MEDICINE

## 2023-11-26 PROCEDURE — 6360000004 HC RX CONTRAST MEDICATION: Performed by: STUDENT IN AN ORGANIZED HEALTH CARE EDUCATION/TRAINING PROGRAM

## 2023-11-26 PROCEDURE — 36415 COLL VENOUS BLD VENIPUNCTURE: CPT

## 2023-11-26 PROCEDURE — 83735 ASSAY OF MAGNESIUM: CPT

## 2023-11-26 PROCEDURE — 83880 ASSAY OF NATRIURETIC PEPTIDE: CPT

## 2023-11-26 PROCEDURE — 93005 ELECTROCARDIOGRAM TRACING: CPT | Performed by: STUDENT IN AN ORGANIZED HEALTH CARE EDUCATION/TRAINING PROGRAM

## 2023-11-26 PROCEDURE — 94761 N-INVAS EAR/PLS OXIMETRY MLT: CPT

## 2023-11-26 PROCEDURE — 6360000002 HC RX W HCPCS: Performed by: STUDENT IN AN ORGANIZED HEALTH CARE EDUCATION/TRAINING PROGRAM

## 2023-11-26 PROCEDURE — 2500000003 HC RX 250 WO HCPCS: Performed by: STUDENT IN AN ORGANIZED HEALTH CARE EDUCATION/TRAINING PROGRAM

## 2023-11-26 PROCEDURE — 71045 X-RAY EXAM CHEST 1 VIEW: CPT

## 2023-11-26 PROCEDURE — 2580000003 HC RX 258: Performed by: STUDENT IN AN ORGANIZED HEALTH CARE EDUCATION/TRAINING PROGRAM

## 2023-11-26 RX ORDER — HEPARIN SODIUM 10000 [USP'U]/100ML
5-30 INJECTION, SOLUTION INTRAVENOUS CONTINUOUS
Status: DISCONTINUED | OUTPATIENT
Start: 2023-11-26 | End: 2023-11-27

## 2023-11-26 RX ORDER — ASPIRIN 81 MG/1
81 TABLET, CHEWABLE ORAL DAILY
Status: DISCONTINUED | OUTPATIENT
Start: 2023-11-27 | End: 2023-11-28 | Stop reason: HOSPADM

## 2023-11-26 RX ORDER — POTASSIUM CHLORIDE 20 MEQ/1
20 TABLET, EXTENDED RELEASE ORAL 2 TIMES DAILY
Status: DISCONTINUED | OUTPATIENT
Start: 2023-11-26 | End: 2023-11-27

## 2023-11-26 RX ORDER — HEPARIN SODIUM 1000 [USP'U]/ML
40 INJECTION, SOLUTION INTRAVENOUS; SUBCUTANEOUS PRN
Status: DISCONTINUED | OUTPATIENT
Start: 2023-11-26 | End: 2023-11-27

## 2023-11-26 RX ORDER — PREGABALIN 25 MG/1
25 CAPSULE ORAL 2 TIMES DAILY
Status: DISCONTINUED | OUTPATIENT
Start: 2023-11-26 | End: 2023-11-28 | Stop reason: HOSPADM

## 2023-11-26 RX ORDER — TAMSULOSIN HYDROCHLORIDE 0.4 MG/1
0.4 CAPSULE ORAL DAILY
Status: DISCONTINUED | OUTPATIENT
Start: 2023-11-27 | End: 2023-11-28 | Stop reason: HOSPADM

## 2023-11-26 RX ORDER — SODIUM CHLORIDE 0.9 % (FLUSH) 0.9 %
5-40 SYRINGE (ML) INJECTION PRN
Status: DISCONTINUED | OUTPATIENT
Start: 2023-11-26 | End: 2023-11-28 | Stop reason: HOSPADM

## 2023-11-26 RX ORDER — HEPARIN SODIUM 1000 [USP'U]/ML
80 INJECTION, SOLUTION INTRAVENOUS; SUBCUTANEOUS ONCE
Status: COMPLETED | OUTPATIENT
Start: 2023-11-26 | End: 2023-11-26

## 2023-11-26 RX ORDER — PROBENECID 500 MG/1
500 TABLET, FILM COATED ORAL DAILY
Status: DISCONTINUED | OUTPATIENT
Start: 2023-11-26 | End: 2023-11-28 | Stop reason: HOSPADM

## 2023-11-26 RX ORDER — ACETAMINOPHEN 650 MG/1
650 SUPPOSITORY RECTAL EVERY 6 HOURS PRN
Status: DISCONTINUED | OUTPATIENT
Start: 2023-11-26 | End: 2023-11-28 | Stop reason: HOSPADM

## 2023-11-26 RX ORDER — SODIUM CHLORIDE, SODIUM LACTATE, POTASSIUM CHLORIDE, AND CALCIUM CHLORIDE .6; .31; .03; .02 G/100ML; G/100ML; G/100ML; G/100ML
500 INJECTION, SOLUTION INTRAVENOUS ONCE
Status: COMPLETED | OUTPATIENT
Start: 2023-11-26 | End: 2023-11-26

## 2023-11-26 RX ORDER — METOPROLOL SUCCINATE 25 MG/1
25 TABLET, EXTENDED RELEASE ORAL DAILY
Status: DISCONTINUED | OUTPATIENT
Start: 2023-11-26 | End: 2023-11-28 | Stop reason: HOSPADM

## 2023-11-26 RX ORDER — AMLODIPINE BESYLATE 5 MG/1
5 TABLET ORAL DAILY
Status: DISCONTINUED | OUTPATIENT
Start: 2023-11-26 | End: 2023-11-27

## 2023-11-26 RX ORDER — PRAMIPEXOLE DIHYDROCHLORIDE 0.25 MG/1
0.5 TABLET ORAL DAILY
Status: DISCONTINUED | OUTPATIENT
Start: 2023-11-26 | End: 2023-11-28 | Stop reason: HOSPADM

## 2023-11-26 RX ORDER — PRAMIPEXOLE DIHYDROCHLORIDE 1 MG/1
1 TABLET ORAL NIGHTLY
Status: DISCONTINUED | OUTPATIENT
Start: 2023-11-26 | End: 2023-11-26

## 2023-11-26 RX ORDER — ACETAMINOPHEN 325 MG/1
650 TABLET ORAL EVERY 6 HOURS PRN
Status: DISCONTINUED | OUTPATIENT
Start: 2023-11-26 | End: 2023-11-28 | Stop reason: HOSPADM

## 2023-11-26 RX ORDER — ONDANSETRON 4 MG/1
4 TABLET, ORALLY DISINTEGRATING ORAL EVERY 8 HOURS PRN
Status: DISCONTINUED | OUTPATIENT
Start: 2023-11-26 | End: 2023-11-28 | Stop reason: HOSPADM

## 2023-11-26 RX ORDER — HYDROCHLOROTHIAZIDE 25 MG/1
50 TABLET ORAL DAILY
Status: DISCONTINUED | OUTPATIENT
Start: 2023-11-26 | End: 2023-11-27

## 2023-11-26 RX ORDER — CARBAMAZEPINE 200 MG/1
200 TABLET ORAL 3 TIMES DAILY
Status: DISCONTINUED | OUTPATIENT
Start: 2023-11-26 | End: 2023-11-28 | Stop reason: HOSPADM

## 2023-11-26 RX ORDER — ONDANSETRON 2 MG/ML
4 INJECTION INTRAMUSCULAR; INTRAVENOUS EVERY 6 HOURS PRN
Status: DISCONTINUED | OUTPATIENT
Start: 2023-11-26 | End: 2023-11-28 | Stop reason: HOSPADM

## 2023-11-26 RX ORDER — HEPARIN SODIUM 1000 [USP'U]/ML
80 INJECTION, SOLUTION INTRAVENOUS; SUBCUTANEOUS PRN
Status: DISCONTINUED | OUTPATIENT
Start: 2023-11-26 | End: 2023-11-27

## 2023-11-26 RX ORDER — PROBENECID AND COLCHICINE 500; .5 MG/1; MG/1
1 TABLET ORAL DAILY
Status: DISCONTINUED | OUTPATIENT
Start: 2023-11-26 | End: 2023-11-26 | Stop reason: CLARIF

## 2023-11-26 RX ORDER — CARBAMAZEPINE 200 MG/1
200 TABLET ORAL 2 TIMES DAILY
Status: DISCONTINUED | OUTPATIENT
Start: 2023-11-26 | End: 2023-11-26

## 2023-11-26 RX ORDER — POLYETHYLENE GLYCOL 3350 17 G/17G
17 POWDER, FOR SOLUTION ORAL DAILY PRN
Status: DISCONTINUED | OUTPATIENT
Start: 2023-11-26 | End: 2023-11-28 | Stop reason: HOSPADM

## 2023-11-26 RX ORDER — SODIUM CHLORIDE 9 MG/ML
INJECTION, SOLUTION INTRAVENOUS PRN
Status: DISCONTINUED | OUTPATIENT
Start: 2023-11-26 | End: 2023-11-28 | Stop reason: HOSPADM

## 2023-11-26 RX ORDER — SODIUM CHLORIDE 0.9 % (FLUSH) 0.9 %
5-40 SYRINGE (ML) INJECTION EVERY 12 HOURS SCHEDULED
Status: DISCONTINUED | OUTPATIENT
Start: 2023-11-26 | End: 2023-11-28 | Stop reason: HOSPADM

## 2023-11-26 RX ORDER — FLUTICASONE PROPIONATE 50 MCG
2 SPRAY, SUSPENSION (ML) NASAL DAILY
Status: DISCONTINUED | OUTPATIENT
Start: 2023-11-26 | End: 2023-11-28 | Stop reason: HOSPADM

## 2023-11-26 RX ORDER — COLCHICINE 0.6 MG/1
0.6 TABLET ORAL DAILY
Status: DISCONTINUED | OUTPATIENT
Start: 2023-11-26 | End: 2023-11-27

## 2023-11-26 RX ADMIN — HEPARIN SODIUM 3260 UNITS: 1000 INJECTION INTRAVENOUS; SUBCUTANEOUS at 22:28

## 2023-11-26 RX ADMIN — IOPAMIDOL 75 ML: 755 INJECTION, SOLUTION INTRAVENOUS at 09:40

## 2023-11-26 RX ADMIN — PROBENECID 500 MG: 500 TABLET, FILM COATED ORAL at 17:17

## 2023-11-26 RX ADMIN — SODIUM CHLORIDE, POTASSIUM CHLORIDE, SODIUM LACTATE AND CALCIUM CHLORIDE 500 ML: 600; 310; 30; 20 INJECTION, SOLUTION INTRAVENOUS at 07:26

## 2023-11-26 RX ADMIN — METOPROLOL SUCCINATE 25 MG: 25 TABLET, EXTENDED RELEASE ORAL at 17:17

## 2023-11-26 RX ADMIN — COLCHICINE 0.6 MG: 0.6 TABLET ORAL at 17:17

## 2023-11-26 RX ADMIN — CARBAMAZEPINE 200 MG: 200 TABLET ORAL at 17:17

## 2023-11-26 RX ADMIN — PRAMIPEXOLE DIHYDROCHLORIDE 0.5 MG: 0.25 TABLET ORAL at 18:15

## 2023-11-26 RX ADMIN — HEPARIN SODIUM 6530 UNITS: 1000 INJECTION INTRAVENOUS; SUBCUTANEOUS at 10:53

## 2023-11-26 RX ADMIN — FLUTICASONE PROPIONATE 2 SPRAY: 50 SPRAY, METERED NASAL at 20:25

## 2023-11-26 RX ADMIN — PREGABALIN 25 MG: 25 CAPSULE ORAL at 20:25

## 2023-11-26 RX ADMIN — HEPARIN SODIUM 18 UNITS/KG/HR: 10000 INJECTION, SOLUTION INTRAVENOUS at 10:59

## 2023-11-26 RX ADMIN — CARBAMAZEPINE 200 MG: 200 TABLET ORAL at 20:25

## 2023-11-26 NOTE — PROGRESS NOTES
4 Eyes Skin Assessment     NAME:  Abad Temple  YOB: 1945  MEDICAL RECORD NUMBER:  [id number]    The patient is being assessed for  Admission    I agree that at least one RN has performed a thorough Head to Toe Skin Assessment on the patient. ALL assessment sites listed below have been assessed. Areas assessed by both nurses:    Head, Face, Ears, Shoulders, Back, Chest, Arms, Elbows, Hands, Sacrum. Buttock, Coccyx, Ischium, and Legs. Feet and Heels        Does the Patient have a Wound?  No noted wound(s)       Fernando Prevention initiated by RN: No  Wound Care Orders initiated by RN: No    Pressure Injury (Stage 3,4, Unstageable, DTI, NWPT, and Complex wounds) if present, place Wound referral order by RN under : No    New Ostomies, if present place, Ostomy referral order under : No     Nurse 1 eSignature: Electronically signed by Keiry Bryant RN on 11/26/23 at 4:58 PM EST    **SHARE this note so that the co-signing nurse can place an eSignature**    Nurse 2 eSignature: Electronically signed by Bhupinder Cazares RN on 11/26/23 at 5:01 PM EST

## 2023-11-26 NOTE — H&P
TRIG 137 07/12/2018 08:22 AM     Hemoglobin A1C: No results found for: \"LABA1C\"  TSH: No results found for: \"TSH\"  Troponin:   Lab Results   Component Value Date/Time    TROPONINT 0.023 08/10/2020 12:30 PM    TROPONINT 0.033 08/09/2020 11:00 PM    TROPONINT 0.042 08/09/2020 04:23 PM     Lactic Acid: No results for input(s): \"LACTA\" in the last 72 hours. BNP:   Recent Labs     11/26/23  0719   PROBNP 7,114*     UA:  Lab Results   Component Value Date/Time    NITRU NEGATIVE 10/15/2020 01:17 PM    COLORU YELLOW 10/15/2020 01:17 PM    WBCUA <1 10/15/2020 01:17 PM    RBCUA <1 10/15/2020 01:17 PM    MUCUS RARE 08/09/2020 05:30 PM    TRICHOMONAS NONE SEEN 10/15/2020 01:17 PM    BACTERIA NEGATIVE 10/15/2020 01:17 PM    CLARITYU CLEAR 10/15/2020 01:17 PM    SPECGRAV 1.013 10/15/2020 01:17 PM    LEUKOCYTESUR NEGATIVE 10/15/2020 01:17 PM    UROBILINOGEN NORMAL 10/15/2020 01:17 PM    BILIRUBINUR NEGATIVE 10/15/2020 01:17 PM    BLOODU NEGATIVE 10/15/2020 01:17 PM    KETUA NEGATIVE 10/15/2020 01:17 PM     Urine Cultures: No results found for: \"LABURIN\"  Blood Cultures: No results found for: \"BC\"  No results found for: \"BLOODCULT2\"  Organism: No results found for: \"ORG\"    Imaging/Diagnostics Last 24 Hours   CTA CHEST W CONTRAST    Result Date: 11/26/2023  EXAMINATION: CTA OF THE CHEST 11/26/2023 9:10 am TECHNIQUE: CTA of the chest was performed after the administration of intravenous contrast.  Multiplanar reformatted images are provided for review. MIP images are provided for review. Automated exposure control, iterative reconstruction, and/or weight based adjustment of the mA/kV was utilized to reduce the radiation dose to as low as reasonably achievable. COMPARISON: None.  HISTORY: ORDERING SYSTEM PROVIDED HISTORY: Rule out PE, shortness of breath, history of DVT, elevated D-dimer TECHNOLOGIST PROVIDED HISTORY: Reason for exam:->Rule out PE, shortness of breath, history of DVT, elevated D-dimer Decision Support Exception -

## 2023-11-26 NOTE — ED NOTES
[address]  11/26/23 0324
Admit;  [address] [facility] [address] [place]  11/26/23 1052
following components:    Pro-BNP 7,114 (*)     All other components within normal limits   D-DIMER, RAPID - Abnormal; Notable for the following components:    D-Dimer, Quant 3.72 (*)     All other components within normal limits   APTT - Abnormal; Notable for the following components:    aPTT 39.9 (*)     All other components within normal limits   PROTIME-INR - Abnormal; Notable for the following components:    Protime 15.4 (*)     All other components within normal limits   ANTI-XA, UNFRACTIONATED HEPARIN - Abnormal; Notable for the following components:    Anti-XA Unfrac Heparin <0.10 (*)     All other components within normal limits       Background  History:   Past Medical History:   Diagnosis Date    DVT (deep venous thrombosis) (HCC)     History of Holter monitoring 07/28/2017    Rhythm is sinus    Hx of blood clots     bilateral legs in 2018    Hypertension     Myeloproliferative neoplasm ([address]) 10/30/2020    Shingles        Assessment    Vitals:    Level of Consciousness: Alert (0)   Vitals:    11/26/23 1242 11/26/23 1300 11/26/23 1400 11/26/23 1430   BP:  112/62 107/61 118/60   Pulse: 89 96 83 85   Resp: 23 27 24 17   Temp:       TempSrc:       SpO2: 95% 92% 96% 95%   Weight:         PO Status: Regular  O2 Flow Rate:      Cardiac Rhythm: NSR  Last documented pain medication administered:   NIH Score: NIH     Active LDA's:   Peripheral IV 11/26/23 Right Antecubital (Active)   Site Assessment Clean, dry & intact 11/26/23 0721   Line Status Brisk blood return;Flushed;Normal saline locked;Specimen collected 11/26/23 0721       Pertinent or High Risk Medications/Drips: yes   If Yes, please provide details:   Blood Product Administration: no  If Yes, please provide details:     Recommendation    Incomplete orders   Additional Comments:    If any further questions, please call Sending RN at [phone]    Electronically signed by: Electronically signed by Michael Florian RN on 11/26/2023 at 2:46 PM      Lauri

## 2023-11-26 NOTE — ED PROVIDER NOTES
elevated BNP and elevated D-dimer. X-ray shows some mild atelectasis. CTA shows bilateral pulmonary emboli so patient started on heparin. History from : Patient and Significant Other wife    Limitations to history : None    Patient was given the following medications:  Medications   heparin (porcine) injection 6,530 Units (has no administration in time range)   heparin (porcine) injection 6,530 Units (has no administration in time range)   heparin (porcine) injection 3,260 Units (has no administration in time range)   heparin 25,000 unit in sodium chloride 0.45% 250 mL (premix) infusion (has no administration in time range)   lactated ringers bolus bolus 500 mL (0 mLs IntraVENous Stopped 11/26/23 0827)   iopamidol (ISOVUE-370) 76 % injection 75 mL (75 mLs IntraVENous Given 11/26/23 0940)       Independent Imaging Interpretation by me: Chest x-ray, CT of the chest    EKG (if obtained): (All EKG's are interpreted by myself in the absence of a cardiologist)  12 lead EKG per my interpretation:  Sinus Tachycardia with first-degree AV block and PACs as well as T wave inversion in leads V3, V4  Axis is   Left axis deviation  QTc is       prolonged  There is specific T wave changes appreciated. There is no specific ST wave changes appreciated. Prior EKG to compare with was available showing first-degree AV block, T wave inversions are new. Chronic conditions affecting care: History of DVT since this could be caused by PE    Discussion with Other Profesionals : None    Social Determinants : None    Records Reviewed : Outpatient Notes    last hematology note        Disposition Considerations (tests considered but not done, Shared Decision Making, Pt Expectation of Test or Tx.): Admit for bilateral PEs with hypoxia      I am the Primary Clinician of Record. Clinical Impression:  1. Multiple subsegmental pulmonary emboli without acute cor pulmonale (HCC)      Disposition referral (if applicable):   No

## 2023-11-26 NOTE — PLAN OF CARE
Problem: Discharge Planning  Goal: Discharge to home or other facility with appropriate resources  Outcome: Progressing     Problem: Safety - Adult  Goal: Free from fall injury  Outcome: Progressing     Problem: ABCDS Injury Assessment  Goal: Absence of physical injury  Outcome: Progressing     Problem: Respiratory - Adult  Goal: Achieves optimal ventilation and oxygenation  Outcome: Progressing     Problem: Cardiovascular - Adult  Goal: Maintains optimal cardiac output and hemodynamic stability  Outcome: Progressing  Goal: Absence of cardiac dysrhythmias or at baseline  Outcome: Progressing     Problem: Hematologic - Adult  Goal: Maintains hematologic stability  Outcome: Progressing

## 2023-11-27 ENCOUNTER — APPOINTMENT (OUTPATIENT)
Dept: NON INVASIVE DIAGNOSTICS | Age: 78
DRG: 176 | End: 2023-11-27
Attending: STUDENT IN AN ORGANIZED HEALTH CARE EDUCATION/TRAINING PROGRAM
Payer: MEDICARE

## 2023-11-27 PROBLEM — I26.94 MULTIPLE SUBSEGMENTAL PULMONARY EMBOLI WITHOUT ACUTE COR PULMONALE (HCC): Status: ACTIVE | Noted: 2023-11-26

## 2023-11-27 PROBLEM — E44.0 MODERATE MALNUTRITION (HCC): Chronic | Status: ACTIVE | Noted: 2023-11-27

## 2023-11-27 LAB
ANION GAP SERPL CALCULATED.3IONS-SCNC: 10 MMOL/L (ref 4–16)
ANTI-XA UNFRAC HEPARIN: 0.5 IU/ML (ref 0.3–0.7)
BASOPHILS ABSOLUTE: 0.2 K/CU MM
BASOPHILS RELATIVE PERCENT: 1.8 % (ref 0–1)
BUN SERPL-MCNC: 15 MG/DL (ref 6–23)
CALCIUM SERPL-MCNC: 8.9 MG/DL (ref 8.3–10.6)
CHLORIDE BLD-SCNC: 99 MMOL/L (ref 99–110)
CHOLEST SERPL-MCNC: 114 MG/DL
CO2: 29 MMOL/L (ref 21–32)
CREAT SERPL-MCNC: 0.9 MG/DL (ref 0.9–1.3)
DIFFERENTIAL TYPE: ABNORMAL
ECHO AO ROOT DIAM: 3.8 CM
ECHO AO ROOT INDEX: 1.96 CM/M2
ECHO AV AREA PEAK VELOCITY: 3.1 CM2
ECHO AV AREA VTI: 2.7 CM2
ECHO AV AREA/BSA PEAK VELOCITY: 1.6 CM2/M2
ECHO AV AREA/BSA VTI: 1.4 CM2/M2
ECHO AV MEAN GRADIENT: 4 MMHG
ECHO AV MEAN VELOCITY: 0.9 M/S
ECHO AV PEAK GRADIENT: 7 MMHG
ECHO AV PEAK VELOCITY: 1.4 M/S
ECHO AV VELOCITY RATIO: 0.71
ECHO AV VTI: 27.8 CM
ECHO BSA: 1.96 M2
ECHO EST RA PRESSURE: 3 MMHG
ECHO LA AREA 4C: 21.1 CM2
ECHO LA MAJOR AXIS: 6.2 CM
ECHO LA VOL MOD A4C: 59 ML (ref 18–58)
ECHO LA VOLUME INDEX MOD A4C: 30 ML/M2 (ref 16–34)
ECHO LV E' LATERAL VELOCITY: 9 CM/S
ECHO LV E' SEPTAL VELOCITY: 5 CM/S
ECHO LV FRACTIONAL SHORTENING: 34 % (ref 28–44)
ECHO LV INTERNAL DIMENSION DIASTOLE INDEX: 2.58 CM/M2
ECHO LV INTERNAL DIMENSION DIASTOLIC: 5 CM (ref 4.2–5.9)
ECHO LV INTERNAL DIMENSION SYSTOLIC INDEX: 1.7 CM/M2
ECHO LV INTERNAL DIMENSION SYSTOLIC: 3.3 CM
ECHO LV IVSD: 1.3 CM (ref 0.6–1)
ECHO LV MASS 2D: 247.6 G (ref 88–224)
ECHO LV MASS INDEX 2D: 127.6 G/M2 (ref 49–115)
ECHO LV POSTERIOR WALL DIASTOLIC: 1.2 CM (ref 0.6–1)
ECHO LV RELATIVE WALL THICKNESS RATIO: 0.48
ECHO LVOT AREA: 4.2 CM2
ECHO LVOT AV VTI INDEX: 0.65
ECHO LVOT DIAM: 2.3 CM
ECHO LVOT MEAN GRADIENT: 2 MMHG
ECHO LVOT PEAK GRADIENT: 4 MMHG
ECHO LVOT PEAK VELOCITY: 1 M/S
ECHO LVOT STROKE VOLUME INDEX: 39 ML/M2
ECHO LVOT SV: 75.6 ML
ECHO LVOT VTI: 18.2 CM
ECHO MV A VELOCITY: 0.9 M/S
ECHO MV E DECELERATION TIME (DT): 250 MS
ECHO MV E VELOCITY: 0.48 M/S
ECHO MV E/A RATIO: 0.53
ECHO MV E/E' LATERAL: 5.33
ECHO MV E/E' RATIO (AVERAGED): 7.47
ECHO RIGHT VENTRICULAR SYSTOLIC PRESSURE (RVSP): 41 MMHG
ECHO RV FREE WALL PEAK S': 13 CM/S
ECHO RV TAPSE: 2.3 CM (ref 1.7–?)
ECHO TV REGURGITANT MAX VELOCITY: 3.08 M/S
ECHO TV REGURGITANT PEAK GRADIENT: 38 MMHG
EOSINOPHILS ABSOLUTE: 0.3 K/CU MM
EOSINOPHILS RELATIVE PERCENT: 2.5 % (ref 0–3)
GFR SERPL CREATININE-BSD FRML MDRD: >60 ML/MIN/1.73M2
GLUCOSE SERPL-MCNC: 112 MG/DL (ref 70–99)
HCT VFR BLD CALC: 42.5 % (ref 42–52)
HDLC SERPL-MCNC: 40 MG/DL
HEMOGLOBIN: 13.7 GM/DL (ref 13.5–18)
IMMATURE NEUTROPHIL %: 2.2 % (ref 0–0.43)
LDLC SERPL CALC-MCNC: 51 MG/DL
LYMPHOCYTES ABSOLUTE: 0.8 K/CU MM
LYMPHOCYTES RELATIVE PERCENT: 6.7 % (ref 24–44)
MCH RBC QN AUTO: 28.9 PG (ref 27–31)
MCHC RBC AUTO-ENTMCNC: 32.2 % (ref 32–36)
MCV RBC AUTO: 89.7 FL (ref 78–100)
MONOCYTES ABSOLUTE: 0.9 K/CU MM
MONOCYTES RELATIVE PERCENT: 6.9 % (ref 0–4)
NUCLEATED RBC %: 0 %
PDW BLD-RTO: 16.6 % (ref 11.7–14.9)
PLATELET # BLD: 127 K/CU MM (ref 140–440)
PMV BLD AUTO: 10.5 FL (ref 7.5–11.1)
POTASSIUM SERPL-SCNC: 3.6 MMOL/L (ref 3.5–5.1)
RBC # BLD: 4.74 M/CU MM (ref 4.6–6.2)
SEGMENTED NEUTROPHILS ABSOLUTE COUNT: 10 K/CU MM
SEGMENTED NEUTROPHILS RELATIVE PERCENT: 79.9 % (ref 36–66)
SODIUM BLD-SCNC: 138 MMOL/L (ref 135–145)
T4 FREE SERPL-MCNC: 1.1 NG/DL (ref 0.9–1.8)
TOTAL IMMATURE NEUTOROPHIL: 0.28 K/CU MM
TOTAL NUCLEATED RBC: 0 K/CU MM
TRIGL SERPL-MCNC: 113 MG/DL
TSH SERPL DL<=0.005 MIU/L-ACNC: 0.66 UIU/ML (ref 0.27–4.2)
WBC # BLD: 12.5 K/CU MM (ref 4–10.5)

## 2023-11-27 PROCEDURE — 6360000002 HC RX W HCPCS: Performed by: STUDENT IN AN ORGANIZED HEALTH CARE EDUCATION/TRAINING PROGRAM

## 2023-11-27 PROCEDURE — 84443 ASSAY THYROID STIM HORMONE: CPT

## 2023-11-27 PROCEDURE — 6370000000 HC RX 637 (ALT 250 FOR IP): Performed by: INTERNAL MEDICINE

## 2023-11-27 PROCEDURE — 2500000003 HC RX 250 WO HCPCS: Performed by: STUDENT IN AN ORGANIZED HEALTH CARE EDUCATION/TRAINING PROGRAM

## 2023-11-27 PROCEDURE — 85520 HEPARIN ASSAY: CPT

## 2023-11-27 PROCEDURE — 1200000000 HC SEMI PRIVATE

## 2023-11-27 PROCEDURE — 94761 N-INVAS EAR/PLS OXIMETRY MLT: CPT

## 2023-11-27 PROCEDURE — 99223 1ST HOSP IP/OBS HIGH 75: CPT | Performed by: INTERNAL MEDICINE

## 2023-11-27 PROCEDURE — 2580000003 HC RX 258: Performed by: STUDENT IN AN ORGANIZED HEALTH CARE EDUCATION/TRAINING PROGRAM

## 2023-11-27 PROCEDURE — 94618 PULMONARY STRESS TESTING: CPT

## 2023-11-27 PROCEDURE — 2700000000 HC OXYGEN THERAPY PER DAY

## 2023-11-27 PROCEDURE — 84439 ASSAY OF FREE THYROXINE: CPT

## 2023-11-27 PROCEDURE — APPNB60 APP NON BILLABLE TIME 46-60 MINS: Performed by: PHYSICIAN ASSISTANT

## 2023-11-27 PROCEDURE — 80048 BASIC METABOLIC PNL TOTAL CA: CPT

## 2023-11-27 PROCEDURE — 85025 COMPLETE CBC W/AUTO DIFF WBC: CPT

## 2023-11-27 PROCEDURE — 80061 LIPID PANEL: CPT

## 2023-11-27 PROCEDURE — 36415 COLL VENOUS BLD VENIPUNCTURE: CPT

## 2023-11-27 PROCEDURE — 93306 TTE W/DOPPLER COMPLETE: CPT

## 2023-11-27 PROCEDURE — 6370000000 HC RX 637 (ALT 250 FOR IP): Performed by: STUDENT IN AN ORGANIZED HEALTH CARE EDUCATION/TRAINING PROGRAM

## 2023-11-27 RX ORDER — ENOXAPARIN SODIUM 100 MG/ML
1 INJECTION SUBCUTANEOUS 2 TIMES DAILY
Status: DISCONTINUED | OUTPATIENT
Start: 2023-11-27 | End: 2023-11-27

## 2023-11-27 RX ORDER — HEPARIN SODIUM 10000 [USP'U]/100ML
5-30 INJECTION, SOLUTION INTRAVENOUS CONTINUOUS
Status: DISCONTINUED | OUTPATIENT
Start: 2023-11-27 | End: 2023-11-27

## 2023-11-27 RX ADMIN — PRAMIPEXOLE DIHYDROCHLORIDE 0.5 MG: 0.25 TABLET ORAL at 08:16

## 2023-11-27 RX ADMIN — RIVAROXABAN 15 MG: 15 TABLET, FILM COATED ORAL at 17:16

## 2023-11-27 RX ADMIN — HEPARIN SODIUM 20 UNITS/KG/HR: 10000 INJECTION, SOLUTION INTRAVENOUS at 03:15

## 2023-11-27 RX ADMIN — CARBAMAZEPINE 200 MG: 200 TABLET ORAL at 08:17

## 2023-11-27 RX ADMIN — TAMSULOSIN HYDROCHLORIDE 0.4 MG: 0.4 CAPSULE ORAL at 08:17

## 2023-11-27 RX ADMIN — SODIUM CHLORIDE, PRESERVATIVE FREE 10 ML: 5 INJECTION INTRAVENOUS at 08:21

## 2023-11-27 RX ADMIN — CARBAMAZEPINE 200 MG: 200 TABLET ORAL at 20:41

## 2023-11-27 RX ADMIN — PREGABALIN 25 MG: 25 CAPSULE ORAL at 08:16

## 2023-11-27 RX ADMIN — ASPIRIN 81 MG: 81 TABLET, CHEWABLE ORAL at 08:17

## 2023-11-27 RX ADMIN — PREGABALIN 25 MG: 25 CAPSULE ORAL at 20:41

## 2023-11-27 RX ADMIN — CARBAMAZEPINE 200 MG: 200 TABLET ORAL at 14:36

## 2023-11-27 RX ADMIN — METOPROLOL SUCCINATE 25 MG: 25 TABLET, EXTENDED RELEASE ORAL at 08:17

## 2023-11-27 RX ADMIN — COLCHICINE 0.6 MG: 0.6 TABLET ORAL at 08:17

## 2023-11-27 RX ADMIN — ENOXAPARIN SODIUM 80 MG: 100 INJECTION SUBCUTANEOUS at 10:00

## 2023-11-27 RX ADMIN — SODIUM CHLORIDE, PRESERVATIVE FREE 10 ML: 5 INJECTION INTRAVENOUS at 20:41

## 2023-11-27 RX ADMIN — PROBENECID 500 MG: 500 TABLET, FILM COATED ORAL at 08:20

## 2023-11-27 ASSESSMENT — PAIN SCALES - GENERAL
PAINLEVEL_OUTOF10: 0

## 2023-11-27 NOTE — PROGRESS NOTES
Pt states they have [address] for insurance until January then switch is happening to [facility]. Pt has 3 month prescription for oxygen and [employer] is not accepted by [facility]. Pt given names of Home Oxygen suppliers. [employer] is their choice at this time.

## 2023-11-27 NOTE — PLAN OF CARE
Problem: Discharge Planning  Goal: Discharge to home or other facility with appropriate resources  11/27/2023 0416 by Abbie Lomeli RN  Outcome: Progressing  11/26/2023 1653 by Myriam Beltran RN  Outcome: Progressing     Problem: Safety - Adult  Goal: Free from fall injury  11/27/2023 0416 by Abbie Lomeli RN  Outcome: Progressing  11/26/2023 1653 by Myriam Beltran RN  Outcome: Progressing     Problem: ABCDS Injury Assessment  Goal: Absence of physical injury  11/27/2023 0416 by Abbie Lomeli RN  Outcome: Progressing  11/26/2023 1653 by Myriam Beltran RN  Outcome: Progressing     Problem: Respiratory - Adult  Goal: Achieves optimal ventilation and oxygenation  11/27/2023 0416 by Abbie Lomeli RN  Outcome: Progressing  11/26/2023 1653 by Myriam Beltran RN  Outcome: Progressing     Problem: Cardiovascular - Adult  Goal: Maintains optimal cardiac output and hemodynamic stability  11/27/2023 0416 by Abbie Lomeli RN  Outcome: Progressing  11/26/2023 1653 by Myriam Beltran RN  Outcome: Progressing  Goal: Absence of cardiac dysrhythmias or at baseline  11/27/2023 0416 by Abbie Lomeli RN  Outcome: Progressing  11/26/2023 1653 by Myriam Beltran RN  Outcome: Progressing     Problem: Hematologic - Adult  Goal: Maintains hematologic stability  11/27/2023 0416 by Abbie Lomeli RN  Outcome: Progressing  11/26/2023 1653 by Myriam Beltran RN  Outcome: Progressing

## 2023-11-27 NOTE — PROGRESS NOTES
V2.0  History and Physical      Name:  Luis Alberto Morse /Age/Sex: 1945  (66 y.o. male)   MRN & CSN:  [id number] & [id number] Encounter Date/Time: 2023 10:33 AM EST   Location:  [address] [place] [address] PCP: Fitz Martinez MD       Hospital Day: 2    Assessment and Plan:   Luis Alberto Morse is a 66 y.o. male who presents with acute bilateral PE. Hospital Problems             Last Modified POA    * (Principal) Bilateral pulmonary embolism (Lizeth Oris) 2023 Yes     Plan:    Acute bilateral pulmonary embolism -noncompliant with Xarelto for a few weeks  History of recurrent DVT -noncompliant with Xarelto and baby aspirin  Myeloproliferative neoplasm  Patient is relatively active, no recent surgeries but does have myeloproliferative disorder. Presents with worsening SOB and cough and noted to be hypoxic on presentation. CTA chest with acute bilateral pulmonary embolism within the distal lobar and bilateral segmental and subsegmental branches, no evidence of right heart strain. LE Dopplers with DVT involving popliteal and lower leg veins bilaterally  S/p heparin drip, transition to Xarelto (load then maintenance)  Continue aspirin 81 mg  Hematology/oncology consulted  TTE pending  Wean oxygen as able, home oxygen evaluation  Monitor vitals carefully, maintain on telemetry  Counseled extensively on importance of compliance, patient verbalized understanding and is agreeable  Hematology/oncology following: Cleared patient for discharge  Cardiology following: Recommend monitoring overnight with likely discharge tomorrow    Hypertension  Atrial fibrillation -in sinus rhythm  Continue metoprolol  Hold other home antihypertensives as BP soft, resume as able    RLS -continue Mirapex  Trigeminal neuralgia -continue Lyrica  Gout -continue home meds      Disposition:   Current Living situation: Home  Expected Disposition: Home  Estimated D/C: 2 days    Diet ADULT DIET; Regular;  Low Sodium (2 gm)   DVT Prophylaxis

## 2023-11-27 NOTE — PROGRESS NOTES
Patient was seen in hospital for  Pulmonary Embolism . I am prescribing oxygen because the diagnosis and testing requires the patient to have oxygen in the home. Conditions will improve or be benefited by oxygen use. The patient is able to perform good mobility and therefore requires the use of a portable oxygen system for ambulation.

## 2023-11-27 NOTE — PROGRESS NOTES
Comprehensive Nutrition Assessment    Type and Reason for Visit:  Initial, Positive Nutrition Screen (wt loss, poor intake)    Nutrition Recommendations/Plan:   Continue current diet with protein containing snacks     Malnutrition Assessment:  Malnutrition Status: Moderate malnutrition (11/27/23 1515)    Context:  Acute Illness       Nutrition Assessment:    Pt admitted with bilateral PE. H/O DVT (on Xarelto) and myeloproliferative neoplasm. He reports poor intake and wt loss PTA. Pt has lost 18% over the past 8 mos, clinically significant for malnutrition. Currently feeding self with improved po intake per SO, ate all his eggs and cheeseburger today, and lots of ice cream. Denies need for oral supplement. Reviewed protein sources to include with meals and snacks. Pt meets criteria for moderate malnutrition. Will follow as moderate nutrition risk. Nutrition Related Findings:    Mg 1.6, Glu 112   Wound Type: None       Current Nutrition Intake & Therapies:    Average Meal Intake: 51-75%  Average Supplements Intake: None Ordered, Refusing to take  ADULT DIET; Regular; Low Sodium (2 gm)    Anthropometric Measures:  Height: 172.7 cm (5' 8\")  Ideal Body Weight (IBW): 154 lbs (70 kg)    Admission Body Weight: 80.2 kg (176 lb 12.9 oz)  Current Body Weight: 79.8 kg (175 lb 14.8 oz),   IBW. Current BMI (kg/m2): 26.8  Usual Body Weight: 96.8 kg (213 lb 6 oz) (3/15/23)  % Weight Change (Calculated): -17.6                    BMI Categories: Overweight (BMI 25.0-29. 9)    Estimated Daily Nutrient Needs:  Energy Requirements Based On: Kcal/kg  Weight Used for Energy Requirements: Current  Energy (kcal/day): 3802-3053 (25-30 kcal/kg)  Weight Used for Protein Requirements: Ideal  Protein (g/day): 84-98 (1.2-1.4 g/kg IBW_  Method Used for Fluid Requirements: 1 ml/kcal  Fluid (ml/day): 2000    Nutrition Diagnosis:   Moderate malnutrition, In context of acute illness or injury related to inadequate protein-energy intake as

## 2023-11-28 VITALS
BODY MASS INDEX: 26.67 KG/M2 | HEIGHT: 68 IN | WEIGHT: 176 LBS | OXYGEN SATURATION: 92 % | RESPIRATION RATE: 22 BRPM | HEART RATE: 87 BPM | TEMPERATURE: 98.4 F | DIASTOLIC BLOOD PRESSURE: 63 MMHG | SYSTOLIC BLOOD PRESSURE: 131 MMHG

## 2023-11-28 LAB
ANION GAP SERPL CALCULATED.3IONS-SCNC: 12 MMOL/L (ref 4–16)
BASOPHILS ABSOLUTE: 0.2 K/CU MM
BASOPHILS RELATIVE PERCENT: 2.2 % (ref 0–1)
BUN SERPL-MCNC: 19 MG/DL (ref 6–23)
CALCIUM SERPL-MCNC: 8.1 MG/DL (ref 8.3–10.6)
CHLORIDE BLD-SCNC: 101 MMOL/L (ref 99–110)
CO2: 27 MMOL/L (ref 21–32)
CREAT SERPL-MCNC: 0.9 MG/DL (ref 0.9–1.3)
DIFFERENTIAL TYPE: ABNORMAL
EOSINOPHILS ABSOLUTE: 0.4 K/CU MM
EOSINOPHILS RELATIVE PERCENT: 4.2 % (ref 0–3)
GFR SERPL CREATININE-BSD FRML MDRD: >60 ML/MIN/1.73M2
GLUCOSE SERPL-MCNC: 96 MG/DL (ref 70–99)
HCT VFR BLD CALC: 42.2 % (ref 42–52)
HEMOGLOBIN: 13.4 GM/DL (ref 13.5–18)
IMMATURE NEUTROPHIL %: 2.4 % (ref 0–0.43)
LYMPHOCYTES ABSOLUTE: 0.9 K/CU MM
LYMPHOCYTES RELATIVE PERCENT: 8.2 % (ref 24–44)
MAGNESIUM: 2 MG/DL (ref 1.8–2.4)
MCH RBC QN AUTO: 28.5 PG (ref 27–31)
MCHC RBC AUTO-ENTMCNC: 31.8 % (ref 32–36)
MCV RBC AUTO: 89.6 FL (ref 78–100)
MONOCYTES ABSOLUTE: 0.7 K/CU MM
MONOCYTES RELATIVE PERCENT: 6.3 % (ref 0–4)
NUCLEATED RBC %: 0 %
PDW BLD-RTO: 16.1 % (ref 11.7–14.9)
PLATELET # BLD: 143 K/CU MM (ref 140–440)
PMV BLD AUTO: 10.5 FL (ref 7.5–11.1)
POTASSIUM SERPL-SCNC: 3.5 MMOL/L (ref 3.5–5.1)
RBC # BLD: 4.71 M/CU MM (ref 4.6–6.2)
SEGMENTED NEUTROPHILS ABSOLUTE COUNT: 8 K/CU MM
SEGMENTED NEUTROPHILS RELATIVE PERCENT: 76.7 % (ref 36–66)
SODIUM BLD-SCNC: 140 MMOL/L (ref 135–145)
TOTAL IMMATURE NEUTOROPHIL: 0.25 K/CU MM
TOTAL NUCLEATED RBC: 0 K/CU MM
WBC # BLD: 10.4 K/CU MM (ref 4–10.5)

## 2023-11-28 PROCEDURE — 36415 COLL VENOUS BLD VENIPUNCTURE: CPT

## 2023-11-28 PROCEDURE — 6370000000 HC RX 637 (ALT 250 FOR IP): Performed by: INTERNAL MEDICINE

## 2023-11-28 PROCEDURE — 85025 COMPLETE CBC W/AUTO DIFF WBC: CPT

## 2023-11-28 PROCEDURE — 6370000000 HC RX 637 (ALT 250 FOR IP): Performed by: STUDENT IN AN ORGANIZED HEALTH CARE EDUCATION/TRAINING PROGRAM

## 2023-11-28 PROCEDURE — 99231 SBSQ HOSP IP/OBS SF/LOW 25: CPT | Performed by: PHYSICIAN ASSISTANT

## 2023-11-28 PROCEDURE — 80048 BASIC METABOLIC PNL TOTAL CA: CPT

## 2023-11-28 PROCEDURE — 2580000003 HC RX 258: Performed by: STUDENT IN AN ORGANIZED HEALTH CARE EDUCATION/TRAINING PROGRAM

## 2023-11-28 PROCEDURE — 83735 ASSAY OF MAGNESIUM: CPT

## 2023-11-28 RX ORDER — PREGABALIN 25 MG/1
25 CAPSULE ORAL 2 TIMES DAILY
Qty: 60 CAPSULE | Refills: 0 | Status: SHIPPED | OUTPATIENT
Start: 2023-11-28 | End: 2023-12-28

## 2023-11-28 RX ORDER — METOPROLOL SUCCINATE 25 MG/1
25 TABLET, EXTENDED RELEASE ORAL DAILY
Qty: 30 TABLET | Refills: 3 | Status: SHIPPED | OUTPATIENT
Start: 2023-11-29

## 2023-11-28 RX ORDER — FLUTICASONE PROPIONATE 50 MCG
2 SPRAY, SUSPENSION (ML) NASAL DAILY
Qty: 16 G | Refills: 3 | Status: SHIPPED | OUTPATIENT
Start: 2023-11-29

## 2023-11-28 RX ORDER — PROBENECID 500 MG/1
500 TABLET, FILM COATED ORAL DAILY
Qty: 60 TABLET | Refills: 3 | Status: SHIPPED | OUTPATIENT
Start: 2023-11-29

## 2023-11-28 RX ORDER — POTASSIUM CHLORIDE 20 MEQ/1
20 TABLET, EXTENDED RELEASE ORAL
Status: DISCONTINUED | OUTPATIENT
Start: 2023-11-28 | End: 2023-11-28 | Stop reason: HOSPADM

## 2023-11-28 RX ORDER — PRAMIPEXOLE DIHYDROCHLORIDE 0.5 MG/1
0.5 TABLET ORAL DAILY
Qty: 90 TABLET | Refills: 3 | Status: SHIPPED | OUTPATIENT
Start: 2023-11-29

## 2023-11-28 RX ORDER — TAMSULOSIN HYDROCHLORIDE 0.4 MG/1
0.4 CAPSULE ORAL DAILY
Qty: 30 CAPSULE | Refills: 3 | Status: SHIPPED | OUTPATIENT
Start: 2023-11-29

## 2023-11-28 RX ADMIN — PRAMIPEXOLE DIHYDROCHLORIDE 0.5 MG: 0.25 TABLET ORAL at 10:05

## 2023-11-28 RX ADMIN — CARBAMAZEPINE 200 MG: 200 TABLET ORAL at 10:04

## 2023-11-28 RX ADMIN — PROBENECID 500 MG: 500 TABLET, FILM COATED ORAL at 10:17

## 2023-11-28 RX ADMIN — PREGABALIN 25 MG: 25 CAPSULE ORAL at 10:04

## 2023-11-28 RX ADMIN — TAMSULOSIN HYDROCHLORIDE 0.4 MG: 0.4 CAPSULE ORAL at 10:04

## 2023-11-28 RX ADMIN — ASPIRIN 81 MG: 81 TABLET, CHEWABLE ORAL at 10:05

## 2023-11-28 RX ADMIN — METOPROLOL SUCCINATE 25 MG: 25 TABLET, EXTENDED RELEASE ORAL at 10:05

## 2023-11-28 RX ADMIN — RIVAROXABAN 15 MG: 15 TABLET, FILM COATED ORAL at 10:04

## 2023-11-28 RX ADMIN — SODIUM CHLORIDE, PRESERVATIVE FREE 10 ML: 5 INJECTION INTRAVENOUS at 10:06

## 2023-11-28 RX ADMIN — POTASSIUM CHLORIDE 20 MEQ: 1500 TABLET, EXTENDED RELEASE ORAL at 10:17

## 2023-11-28 ASSESSMENT — PAIN SCALES - GENERAL: PAINLEVEL_OUTOF10: 0

## 2023-11-28 NOTE — PROGRESS NOTES
Daily Progress Note  Subjective:  HR 77 NSR  /74 last check, has been in low teens systolic  O2 sat 76% on NC 2 Liters  Denies CP, SOB, Dizziness or palpitations  Ready to go home    Attending Note:    Patient is awake alert feeling better  Ok to d/c home  Keep on current meds  F/u in office in few weeks  Needs oxygen on d/c  Echo showed normal EF     Impression and Plan:   PE/DVT  On Xarelto  ASA 81mg  Monitor    Hx PAF  NS current  On AC  On metoprolol    Most Recent Echo  11/27/23    Left Ventricle: The EF by visual approximation is 55 - 60%. Mildly increased wall thickness. Normal wall motion. Grade I diastolic dysfunction with normal LAP. Right Ventricle: Right ventricle is mildly dilated. Aortic Valve: Sclerosis of the aortic valve cusp. Interatrial Septum: Aneurysmal septum noted. Right Atrium: Right atrium is mildly dilated. Aorta: Normal sized annulus and aortic root. Dilated ascending aorta. Most Recent Stress test  11/30/21  No ischemiaEF 57%, no change when compared to study 10/23/19    [id number] Score for Atrial Fibrillation Stroke Risk   Risk   Factors  Component Value   C CHF No 0   H HTN Yes 1   A2 Age >= 76 Yes,  (74 y.o.) 2   D DM No 0   S2 Prior Stroke/TIA No 0   V Vascular Disease No 0   A Age 77-78 No,  (74 y.o.) 0   Sc Sex male 1    [id number]  Score  4   Score last updated 11/28/23 5:02 AM EST    Click here for a link to the UpToDate guideline \"Atrial Fibrillation: Anticoagulation therapy to prevent embolization    Disclaimer: Risk Score calculation is dependent on accuracy of patient problem list and past encounter diagnosis. Past medical hx: DVT, HTN, Myeloprolierative neoplasm, Gout, pain stimulator, PAF    Past surgical hx: appendetomy, choecycstectomy, mandible fx surger    Social Hx: never smoker, denies alcohol use  Lives with spouse    Radiology  11/27/23  Vas Duplex lower extremities  IMPRESSION:  1.  Right lower extremity DVT involving the

## 2023-11-28 NOTE — DISCHARGE SUMMARY
results found for: \"LABURIN\"  Blood Cultures: No results found for: \"BC\"  No results found for: \"BLOODCULT2\"  Organism: No results found for: \"ORG\"    Time Spent Discharging patient 55 minutes    Electronically signed by Leslie Rhodes MD on 11/28/2023 at 12:02 PM

## 2023-11-28 NOTE — PROGRESS NOTES
Pt qualified for home oxygen. Paperwork faxed to [email or web address]. Please do not discharge pt without oxygen. This testing will  and have to be repeated if pt has not discharged 48 hours from time testing was ordered. Please call [facility] @ [phone] if oxygen has not been delivered prior to pt discharging. Thanks.

## 2023-11-28 NOTE — PROGRESS NOTES
Patient Name:  Melba Amador  Patient :  1945  Patient MRN:  [id number]     Primary Oncologist: Darnell Dong MD  PCP: Alejandra Alpers, MD     Date of Service: 2023      Reason for Consult: PE, on Xarelto     Chief Complaint:    Chief Complaint   Patient presents with    Shortness of Breath     Principal Problem:    Multiple subsegmental pulmonary emboli without acute cor pulmonale ([address])  Active Problems: Moderate malnutrition ([address])  Resolved Problems:    * No resolved hospital problems. *      HPI:   Melba Amador is a 66 y.o. male known to our service with a history of JAK2+ MPN  and recurrent DVT on baby aspirin and Xarelto who presented to [facility] complaining of worsening SOB over the last 2 weeks and low oxygenation on home pulse ox (77%) and was found to have new acute PE in bilateral segmental and subsegmental branches. Bilateral LE doppler was positive for DVT in both legs. Initially he reported strict compliance to Xarelto and aspirin for known hypercoagulable state however on further discussion he admits to spotty compliance since testing positive for Covid in 2023  He felt ill for at least 1 month after diagnosis. Anti-XA was undetectable at time of admission consistent with non-compliance. On exam he is still somewhat SOB. Oxygenating well at rest however desaturates with any exertion including conversation. Some cough with no hemoptysis. No fever/chills. No CP/SOB. No change in bowel or bladder habits. No new headaches, vision changes, or weakness. Counseled on importance of strict compliance with AC and anti-platelet given his diagnosis of MPN as well as clotting history. Lab data reviewed with stable WBC near baseline, plt 127k, Hgb 13.7, Hct 42.5. He had COVID infection about a month ago and he stated that he stopped taking xarelto since 2 weeks ago. Interval   Wife at bedside this morning, he is more interactive and in better spirits. Less SOB.   On

## 2023-12-03 NOTE — PROGRESS NOTES
Patient Name: Talat Hart  Patient : 1945  Patient MRN: [id number]     Primary Oncologist: Funmi Villagomez MD  Referring Provider: Solis Lockett MD     Date of Service: 2023      Chief Complaint:   Chief Complaint   Patient presents with    Follow-up     Patient Active Problem List:     Recurrent deep venous thrombosis      Splenomegaly     JAK2 positive myeloproliferative neoplasm    HPI:   Talat Hart is a 70-year-old pleasant gentleman with medical history significant for hypertension, GERD and recurrent VTE, currently on long term anticoagulation therapy with Xarelto, initially referred to me on 2020 for evaluation of his splenomegaly. He stated that he presented to [place] on 10/15/20 with lower abdominal pain and constipation. He was found to have impacted stool and he was treated with soapsuds enema. His symptom resolved after he had bowel movement. Because of his history of small bowel obstruction, CT scan was ordered. It showed splenic enlargement with heterogeneous enhancement, along with wedge-shaped areas of hypo enhancement. The enlargement is nonspecific, but can be reactive to inflammation or infection elsewhere. However, neoplasm (especially lymphoma) must also be considered. The areas of wedge-shaped hypoenhancement may be secondary to the reactive process, but superimposed necrosis/infarction is more likely. He was released from hospital with an appointment to see me as an out patient. He has about 35 pound weight loss over one year duration. Laboratory work ups done on 10/16/20 showed mild leukocytosis (WBC 14.1) and JAK2 V617F mutation, consistent with JAK2 positive myeloproliferative neoplasm. The rests of the blood test, including flow cytometry, JAK2 exon 12-15, MPL, CALR, SPEP, serum immunofixation, ESR and LDH were within normal range.      CTA pulmonary on 23 showed acute bilateral pulmonary

## 2023-12-05 ENCOUNTER — OFFICE VISIT (OUTPATIENT)
Dept: ONCOLOGY | Age: 78
End: 2023-12-05
Payer: MEDICARE

## 2023-12-05 ENCOUNTER — HOSPITAL ENCOUNTER (OUTPATIENT)
Dept: INFUSION THERAPY | Age: 78
Discharge: HOME | End: 2023-12-05
Payer: MEDICARE

## 2023-12-05 VITALS
SYSTOLIC BLOOD PRESSURE: 148 MMHG | RESPIRATION RATE: 16 BRPM | TEMPERATURE: 97.8 F | WEIGHT: 187.6 LBS | HEIGHT: 68 IN | BODY MASS INDEX: 28.43 KG/M2 | HEART RATE: 82 BPM | DIASTOLIC BLOOD PRESSURE: 67 MMHG | OXYGEN SATURATION: 97 %

## 2023-12-05 DIAGNOSIS — D47.1 MYELOPROLIFERATIVE NEOPLASM (HCC): Primary | ICD-10-CM

## 2023-12-05 PROCEDURE — 3077F SYST BP >= 140 MM HG: CPT | Performed by: INTERNAL MEDICINE

## 2023-12-05 PROCEDURE — 99213 OFFICE O/P EST LOW 20 MIN: CPT | Performed by: INTERNAL MEDICINE

## 2023-12-05 PROCEDURE — 1123F ACP DISCUSS/DSCN MKR DOCD: CPT | Performed by: INTERNAL MEDICINE

## 2023-12-05 PROCEDURE — 3078F DIAST BP <80 MM HG: CPT | Performed by: INTERNAL MEDICINE

## 2023-12-05 PROCEDURE — 99211 OFF/OP EST MAY X REQ PHY/QHP: CPT

## 2023-12-05 ASSESSMENT — PATIENT HEALTH QUESTIONNAIRE - PHQ9
SUM OF ALL RESPONSES TO PHQ QUESTIONS 1-9: 0
1. LITTLE INTEREST OR PLEASURE IN DOING THINGS: 0
SUM OF ALL RESPONSES TO PHQ QUESTIONS 1-9: 0
SUM OF ALL RESPONSES TO PHQ QUESTIONS 1-9: 0
2. FEELING DOWN, DEPRESSED OR HOPELESS: 0
SUM OF ALL RESPONSES TO PHQ9 QUESTIONS 1 & 2: 0
SUM OF ALL RESPONSES TO PHQ QUESTIONS 1-9: 0

## 2023-12-05 NOTE — PROGRESS NOTES
MA Rooming Questions  Patient: Marcella Chaney  MRN: [id number]    Date: 12/5/2023        1. Do you have any new issues?   no         2. Do you need any refills on medications?    no    3. Have you had any imaging done since your last visit? yes -     4. Have you been hospitalized or seen in the emergency room since your last visit here?   yes -     5. Did the patient have a depression screening completed today?  Yes    PHQ-9 Total Score: 0 (12/5/2023  9:55 AM)       PHQ-9 Given to (if applicable):               PHQ-9 Score (if applicable):                     [] Positive     []  Negative              Does question #9 need addressed (if applicable)                     [] Yes    []  No               Hurtis Litten, CMA

## 2024-03-12 ENCOUNTER — HOSPITAL ENCOUNTER (OUTPATIENT)
Dept: INFUSION THERAPY | Age: 79
Discharge: HOME | End: 2024-03-12
Payer: COMMERCIAL

## 2024-03-12 ENCOUNTER — OFFICE VISIT (OUTPATIENT)
Dept: ONCOLOGY | Age: 79
End: 2024-03-12
Payer: MEDICARE

## 2024-03-12 VITALS
BODY MASS INDEX: 32.65 KG/M2 | HEIGHT: 67 IN | DIASTOLIC BLOOD PRESSURE: 86 MMHG | SYSTOLIC BLOOD PRESSURE: 170 MMHG | TEMPERATURE: 97.2 F | OXYGEN SATURATION: 95 % | HEART RATE: 64 BPM | RESPIRATION RATE: 18 BRPM | WEIGHT: 208 LBS

## 2024-03-12 DIAGNOSIS — D47.1 MYELOPROLIFERATIVE NEOPLASM (HCC): ICD-10-CM

## 2024-03-12 DIAGNOSIS — D47.1 MYELOPROLIFERATIVE NEOPLASM (HCC): Primary | ICD-10-CM

## 2024-03-12 LAB
BANDED NEUTROPHILS ABSOLUTE COUNT: 0.37 K/CU MM
BANDED NEUTROPHILS RELATIVE PERCENT: 3 % (ref 5–11)
BASOPHILS ABSOLUTE: 1.1 K/CU MM
BASOPHILS RELATIVE PERCENT: 9 % (ref 0–1)
DIFFERENTIAL TYPE: ABNORMAL
EOSINOPHILS ABSOLUTE: 0.2 K/CU MM
EOSINOPHILS RELATIVE PERCENT: 2 % (ref 0–3)
HCT VFR BLD CALC: 44.6 % (ref 42–52)
HEMOGLOBIN: 14.6 GM/DL (ref 13.5–18)
LACTATE DEHYDROGENASE: 224 IU/L (ref 120–246)
LYMPHOCYTES ABSOLUTE: 0.7 K/CU MM
LYMPHOCYTES RELATIVE PERCENT: 6 % (ref 24–44)
MCH RBC QN AUTO: 28.6 PG (ref 27–31)
MCHC RBC AUTO-ENTMCNC: 32.7 % (ref 32–36)
MCV RBC AUTO: 87.3 FL (ref 78–100)
MONOCYTES ABSOLUTE: 0.5 K/CU MM
MONOCYTES RELATIVE PERCENT: 4 % (ref 0–4)
PDW BLD-RTO: 15.9 % (ref 11.7–14.9)
PLATELET # BLD: 310 K/CU MM (ref 140–440)
PMV BLD AUTO: 9.3 FL (ref 7.5–11.1)
RBC # BLD: 5.11 M/CU MM (ref 4.6–6.2)
RBC # BLD: ABNORMAL 10*6/UL
SEGMENTED NEUTROPHILS ABSOLUTE COUNT: 9.4 K/CU MM
SEGMENTED NEUTROPHILS RELATIVE PERCENT: 76 % (ref 36–66)
WBC # BLD: 12.3 K/CU MM (ref 4–10.5)

## 2024-03-12 PROCEDURE — 99213 OFFICE O/P EST LOW 20 MIN: CPT | Performed by: INTERNAL MEDICINE

## 2024-03-12 PROCEDURE — 36415 COLL VENOUS BLD VENIPUNCTURE: CPT

## 2024-03-12 PROCEDURE — 3079F DIAST BP 80-89 MM HG: CPT | Performed by: INTERNAL MEDICINE

## 2024-03-12 PROCEDURE — 99211 OFF/OP EST MAY X REQ PHY/QHP: CPT

## 2024-03-12 PROCEDURE — 85007 BL SMEAR W/DIFF WBC COUNT: CPT

## 2024-03-12 PROCEDURE — 83615 LACTATE (LD) (LDH) ENZYME: CPT

## 2024-03-12 PROCEDURE — 1123F ACP DISCUSS/DSCN MKR DOCD: CPT | Performed by: INTERNAL MEDICINE

## 2024-03-12 PROCEDURE — 85027 COMPLETE CBC AUTOMATED: CPT

## 2024-03-12 PROCEDURE — 3077F SYST BP >= 140 MM HG: CPT | Performed by: INTERNAL MEDICINE

## 2024-03-12 NOTE — PROGRESS NOTES
MA Rooming Questions  Patient: Samuel Valdes  MRN: [id number]    Date: 3/12/2024        1. Do you have any new issues?   no         2. Do you need any refills on medications?    no    3. Have you had any imaging done since your last visit?   no    4. Have you been hospitalized or seen in the emergency room since your last visit here?   no    5. Did the patient have a depression screening completed today? No    No data recorded     PHQ-9 Given to (if applicable):               PHQ-9 Score (if applicable):                     [] Positive     []  Negative              Does question #9 need addressed (if applicable)                     [] Yes    []  No               Dary Park MA      
clotting issues. He denies any pain on today visit. No anxiety or depression. The rest of the systems are unremarkable.     Vital Signs:  BP (!) 170/86 (Site: Left Upper Arm, Position: Sitting, Cuff Size: Medium Adult)   Pulse 64   Temp 97.2 °F (36.2 °C) (Infrared)   Resp 18   Ht 1.702 m (5' 7\")   Wt 94.3 kg (208 lb)   SpO2 95%   BMI 32.58 kg/m²     Physical Exam:  CONSTITUTIONAL: awake, no apparent distress, alert, cooperative,    EYES: pupils equal, sclera clear and conjunctiva normal, round and reactive to light,   ENT: without obvious abnormality, normocephalic, atraumatic  NECK: supple, symmetrical, no jugular venous distension and no carotid bruits   HEMATOLOGIC/LYMPHATIC: no cervical, supraclavicular or axillary lymphadenopathy   LUNGS: VBS, no wheezes, clear to auscultation, no rhonchi, no increased work of breathing, no crackles,   CARDIOVASCULAR: normal S1 and S2, regular rate and rhythm, no murmur noted  ABDOMEN: soft, non-distended, non-tender, normal bowel sounds x 4, no masses palpated, no hepatosplenomegaly   MUSCULOSKELETAL: full range of motion noted, tone is normal  NEUROLOGIC: awake, alert, oriented to name, place and time. Motor skills grossly intact.   SKIN: Normal skin color, texture, turgor and no jaundice. appears intact   EXTREMITIES: no leg swelling, no clubbing, no cyanosis, no LE edema,       Labs:  Hematology:  Lab Results   Component Value Date    WBC 12.3 (H) 03/12/2024    RBC 5.11 03/12/2024    HGB 14.6 03/12/2024    HCT 44.6 03/12/2024    MCV 87.3 03/12/2024    MCH 28.6 03/12/2024    MCHC 32.7 03/12/2024    RDW 15.9 (H) 03/12/2024     11/28/2023    MPV 9.3 03/12/2024    BANDSPCT 2 (L) 09/01/2023    SEGSPCT 76.7 (H) 11/28/2023    EOSRELPCT 4.2 (H) 11/28/2023    BASOPCT 2.2 (H) 11/28/2023    LYMPHOPCT 8.2 (L) 11/28/2023    MONOPCT 6.3 (H) 11/28/2023    BANDABS 0.26 09/01/2023    SEGSABS 8.0 11/28/2023    EOSABS 0.4 11/28/2023    BASOSABS 0.2 11/28/2023    LYMPHSABS 0.9

## 2024-06-11 ENCOUNTER — OFFICE VISIT (OUTPATIENT)
Dept: ONCOLOGY | Age: 79
End: 2024-06-11
Payer: COMMERCIAL

## 2024-06-11 ENCOUNTER — HOSPITAL ENCOUNTER (OUTPATIENT)
Dept: INFUSION THERAPY | Age: 79
Discharge: HOME | End: 2024-06-11
Payer: COMMERCIAL

## 2024-06-11 VITALS
RESPIRATION RATE: 16 BRPM | DIASTOLIC BLOOD PRESSURE: 70 MMHG | HEART RATE: 72 BPM | TEMPERATURE: 98.2 F | SYSTOLIC BLOOD PRESSURE: 168 MMHG | OXYGEN SATURATION: 94 % | WEIGHT: 205.8 LBS | BODY MASS INDEX: 32.3 KG/M2 | HEIGHT: 67 IN

## 2024-06-11 DIAGNOSIS — D47.1 MYELOPROLIFERATIVE NEOPLASM (HCC): ICD-10-CM

## 2024-06-11 DIAGNOSIS — D47.1 MYELOPROLIFERATIVE NEOPLASM (HCC): Primary | ICD-10-CM

## 2024-06-11 LAB
BANDED NEUTROPHILS ABSOLUTE COUNT: 0.76 K/CU MM
BANDED NEUTROPHILS RELATIVE PERCENT: 5 % (ref 5–11)
BASOPHILS ABSOLUTE: 0.3 K/CU MM
BASOPHILS RELATIVE PERCENT: 2 % (ref 0–1)
DIFFERENTIAL TYPE: ABNORMAL
EOSINOPHILS ABSOLUTE: 0.6 K/CU MM
EOSINOPHILS RELATIVE PERCENT: 4 % (ref 0–3)
HCT VFR BLD CALC: 45.4 % (ref 42–52)
HEMOGLOBIN: 14.4 GM/DL (ref 13.5–18)
LACTATE DEHYDROGENASE: 260 IU/L (ref 120–246)
LYMPHOCYTES ABSOLUTE: 1.1 K/CU MM
LYMPHOCYTES RELATIVE PERCENT: 7 % (ref 24–44)
MCH RBC QN AUTO: 28.1 PG (ref 27–31)
MCHC RBC AUTO-ENTMCNC: 31.7 % (ref 32–36)
MCV RBC AUTO: 88.5 FL (ref 78–100)
MONOCYTES ABSOLUTE: 0.5 K/CU MM
MONOCYTES RELATIVE PERCENT: 3 % (ref 0–4)
NEUTROPHILS ABSOLUTE: 11.8 K/CU MM
NEUTROPHILS RELATIVE PERCENT: 79 % (ref 36–66)
PDW BLD-RTO: 17.1 % (ref 11.7–14.9)
PLATELET # BLD: 350 K/CU MM (ref 140–440)
PMV BLD AUTO: 9.7 FL (ref 7.5–11.1)
RBC # BLD: 5.13 M/CU MM (ref 4.6–6.2)
WBC # BLD: 15.1 K/CU MM (ref 4–10.5)

## 2024-06-11 PROCEDURE — 3077F SYST BP >= 140 MM HG: CPT | Performed by: INTERNAL MEDICINE

## 2024-06-11 PROCEDURE — 3078F DIAST BP <80 MM HG: CPT | Performed by: INTERNAL MEDICINE

## 2024-06-11 PROCEDURE — 85007 BL SMEAR W/DIFF WBC COUNT: CPT

## 2024-06-11 PROCEDURE — 1123F ACP DISCUSS/DSCN MKR DOCD: CPT | Performed by: INTERNAL MEDICINE

## 2024-06-11 PROCEDURE — 85027 COMPLETE CBC AUTOMATED: CPT

## 2024-06-11 PROCEDURE — 36415 COLL VENOUS BLD VENIPUNCTURE: CPT

## 2024-06-11 PROCEDURE — 83615 LACTATE (LD) (LDH) ENZYME: CPT

## 2024-06-11 PROCEDURE — 99213 OFFICE O/P EST LOW 20 MIN: CPT | Performed by: INTERNAL MEDICINE

## 2024-06-11 PROCEDURE — 99211 OFF/OP EST MAY X REQ PHY/QHP: CPT

## 2024-06-11 ASSESSMENT — PATIENT HEALTH QUESTIONNAIRE - PHQ9
2. FEELING DOWN, DEPRESSED OR HOPELESS: NOT AT ALL
SUM OF ALL RESPONSES TO PHQ QUESTIONS 1-9: 0
SUM OF ALL RESPONSES TO PHQ9 QUESTIONS 1 & 2: 0
SUM OF ALL RESPONSES TO PHQ QUESTIONS 1-9: 0
1. LITTLE INTEREST OR PLEASURE IN DOING THINGS: NOT AT ALL
SUM OF ALL RESPONSES TO PHQ QUESTIONS 1-9: 0
SUM OF ALL RESPONSES TO PHQ QUESTIONS 1-9: 0

## 2024-06-11 NOTE — PROGRESS NOTES
LOV: 5/25/17  Last Refilled:#90, 0rfs 6/28/17  Labs:AST 22  ALT 12  9/12/17  Future Appointments  Date Time Provider William Salesisti   9/19/2017 10:00 AM Roseann Pham MD [address] [place] [address] HEM ONC EMO   9/21/2017 9:30 AM Baltazar Andrade PA-C [id number]   10/6/2017 11:00 AM Сергей Conn MD [address] [place] [address]   12/18/2017 11:00 AM Griselda Anderson MD [facility] EMA [place]   1/4/2018 1:45 PM Lissa Ramirez MD [address] [place] [address]       Please advise.
MA Rooming Questions  Patient: Samuel Valdes  MRN: [id number]    Date: 6/11/2024        1. Do you have any new issues?   no         2. Do you need any refills on medications?    no    3. Have you had any imaging done since your last visit?   no    4. Have you been hospitalized or seen in the emergency room since your last visit here?   no    5. Did the patient have a depression screening completed today? No    No data recorded     PHQ-9 Given to (if applicable):               PHQ-9 Score (if applicable):                     [] Positive     []  Negative              Does question #9 need addressed (if applicable)                     [] Yes    []  No               Shoshana Cross CMA      
loss of appetite or weight loss. He doesn't have neuropathy and he denies bleeding or clotting issues. He denies any pain on today visit. No anxiety or depression. The rest of the systems are unremarkable.     Vital Signs:  BP (!) 168/70 (Site: Right Upper Arm, Position: Sitting, Cuff Size: Medium Adult)   Pulse 72   Temp 98.2 °F (36.8 °C) (Temporal)   Resp 16   Ht 1.702 m (5' 7.01\")   Wt 93.4 kg (205 lb 12.8 oz)   SpO2 94%   BMI 32.23 kg/m²     Physical Exam:  CONSTITUTIONAL: awake, no apparent distress, alert, cooperative,    EYES: pupils equal, sclera clear and conjunctiva normal, round and reactive to light,   ENT: without obvious abnormality, normocephalic, atraumatic  NECK: supple, symmetrical, no jugular venous distension and no carotid bruits   HEMATOLOGIC/LYMPHATIC: no cervical, supraclavicular or axillary lymphadenopathy   LUNGS: VBS, no wheezes, clear to auscultation, no rhonchi, no increased work of breathing, no crackles,   CARDIOVASCULAR: normal S1 and S2, regular rate and rhythm, no murmur noted  ABDOMEN: soft, non-distended, non-tender, normal bowel sounds x 4, no masses palpated, no hepatosplenomegaly   MUSCULOSKELETAL: full range of motion noted, tone is normal  NEUROLOGIC: awake, alert, oriented to name, place and time. Motor skills grossly intact.   SKIN: Normal skin color, texture, turgor and no jaundice. appears intact   EXTREMITIES: no leg swelling, no clubbing, no cyanosis, no LE edema,       Labs:  Hematology:  Lab Results   Component Value Date    WBC 15.1 (H) 06/11/2024    RBC 5.13 06/11/2024    HGB 14.4 06/11/2024    HCT 45.4 06/11/2024    MCV 88.5 06/11/2024    MCH 28.1 06/11/2024    MCHC 31.7 (L) 06/11/2024    RDW 17.1 (H) 06/11/2024     06/11/2024    MPV 9.7 06/11/2024    BANDSPCT 5 06/11/2024    BASOPCT 2.0 (H) 06/11/2024    LYMPHOPCT 7.0 (L) 06/11/2024    MONOPCT 3.0 06/11/2024    BANDABS 0.76 06/11/2024    EOSABS 0.6 06/11/2024    BASOSABS 0.3 06/11/2024    MONOSABS

## 2024-06-23 ASSESSMENT — PAIN - FUNCTIONAL ASSESSMENT: PAIN_FUNCTIONAL_ASSESSMENT: 0-10

## 2024-06-23 ASSESSMENT — PAIN SCALES - GENERAL: PAINLEVEL_OUTOF10: 9

## 2024-06-24 ENCOUNTER — HOSPITAL ENCOUNTER (EMERGENCY)
Age: 79
Discharge: LEFT BEFORE DISCHARGE | End: 2024-06-24

## 2024-06-24 VITALS
WEIGHT: 206 LBS | DIASTOLIC BLOOD PRESSURE: 72 MMHG | HEART RATE: 94 BPM | TEMPERATURE: 98.6 F | RESPIRATION RATE: 20 BRPM | SYSTOLIC BLOOD PRESSURE: 155 MMHG | HEIGHT: 68 IN | OXYGEN SATURATION: 93 % | BODY MASS INDEX: 31.22 KG/M2

## 2024-06-24 NOTE — ED TRIAGE NOTES
Patient presents to the ED from home with complaints of bilateral leg pain, swelling and a rash. Patient states \"I think I got the shingles in my legs.\" Patient states he had it before on his face and it is presenting the same way. Rates pain 9/10, denies taking medication at home PTA. Patient states pain is sharp.

## 2024-09-26 ENCOUNTER — HOSPITAL ENCOUNTER (OUTPATIENT)
Dept: INFUSION THERAPY | Age: 79
Discharge: HOME | End: 2024-09-26
Payer: MEDICARE

## 2024-09-26 ENCOUNTER — OFFICE VISIT (OUTPATIENT)
Dept: ONCOLOGY | Age: 79
End: 2024-09-26
Payer: MEDICARE

## 2024-09-26 VITALS
OXYGEN SATURATION: 93 % | WEIGHT: 217 LBS | DIASTOLIC BLOOD PRESSURE: 72 MMHG | TEMPERATURE: 98 F | HEART RATE: 53 BPM | HEIGHT: 68 IN | SYSTOLIC BLOOD PRESSURE: 179 MMHG | BODY MASS INDEX: 32.89 KG/M2

## 2024-09-26 DIAGNOSIS — D47.1 MYELOPROLIFERATIVE NEOPLASM (HCC): Primary | ICD-10-CM

## 2024-09-26 PROCEDURE — 99211 OFF/OP EST MAY X REQ PHY/QHP: CPT

## 2024-09-26 PROCEDURE — 3077F SYST BP >= 140 MM HG: CPT | Performed by: INTERNAL MEDICINE

## 2024-09-26 PROCEDURE — G8427 DOCREV CUR MEDS BY ELIG CLIN: HCPCS | Performed by: INTERNAL MEDICINE

## 2024-09-26 PROCEDURE — 1036F TOBACCO NON-USER: CPT | Performed by: INTERNAL MEDICINE

## 2024-09-26 PROCEDURE — 3078F DIAST BP <80 MM HG: CPT | Performed by: INTERNAL MEDICINE

## 2024-09-26 PROCEDURE — 99213 OFFICE O/P EST LOW 20 MIN: CPT | Performed by: INTERNAL MEDICINE

## 2024-09-26 PROCEDURE — G8417 CALC BMI ABV UP PARAM F/U: HCPCS | Performed by: INTERNAL MEDICINE

## 2024-09-26 PROCEDURE — 1123F ACP DISCUSS/DSCN MKR DOCD: CPT | Performed by: INTERNAL MEDICINE

## 2024-09-26 RX ORDER — BACITRACIN ZINC AND POLYMYXIN B SULFATE 500; 1000 [USP'U]/G; [USP'U]/G
OINTMENT TOPICAL
Qty: 30 G | Refills: 1 | Status: SHIPPED | OUTPATIENT
Start: 2024-09-26 | End: 2024-10-03

## 2025-01-26 NOTE — PROGRESS NOTES
Patient Name: Samuel Valdes  Patient : 1945  Patient MRN: [id number]     Primary Oncologist: Riley Buck MD  Referring Provider: Allan Perez MD     Date of Service: 2025      Chief Complaint:   Chief Complaint   Patient presents with    Follow-up     Patient Active Problem List:     Recurrent deep venous thrombosis      Splenomegaly     JAK2 positive myeloproliferative neoplasm    HPI:   Samuel Valdes is a 79-year-old pleasant gentleman with medical history significant for hypertension, GERD and recurrent VTE, currently on long term anticoagulation therapy with Xarelto, initially referred to me on 2020 for evaluation of his splenomegaly.     He stated that he presented to [facility] on 10/15/20 with lower abdominal pain and constipation. He was found to have impacted stool and he was treated with soapsuds enema. His symptom resolved after he had bowel movement.     Because of his history of small bowel obstruction, CT scan was ordered. It showed splenic enlargement with heterogeneous enhancement, along with wedge-shaped areas of hypo enhancement. The enlargement is nonspecific, but can be reactive to inflammation or infection elsewhere. However, neoplasm (especially lymphoma) must also be considered.     The areas of wedge-shaped hypoenhancement may be secondary to the reactive process, but superimposed necrosis/infarction is more likely.     He was released from hospital with an appointment to see me as an out patient. He has about 35 pound weight loss over one year duration.     Laboratory work ups done on 10/16/20 showed mild leukocytosis (WBC 14.1) and JAK2 V617F mutation, consistent with JAK2 positive myeloproliferative neoplasm.     The rests of the blood test, including flow cytometry, JAK2 exon 12-15, MPL, CALR, SPEP, serum immunofixation, ESR and LDH were within normal range.     CTA pulmonary on 23 showed acute bilateral pulmonary

## 2025-01-28 ENCOUNTER — OFFICE VISIT (OUTPATIENT)
Dept: ONCOLOGY | Age: 80
End: 2025-01-28
Payer: MEDICARE

## 2025-01-28 ENCOUNTER — HOSPITAL ENCOUNTER (OUTPATIENT)
Dept: INFUSION THERAPY | Age: 80
Discharge: HOME | End: 2025-01-28
Payer: MEDICARE

## 2025-01-28 VITALS
HEIGHT: 68 IN | OXYGEN SATURATION: 96 % | BODY MASS INDEX: 31.46 KG/M2 | HEART RATE: 69 BPM | SYSTOLIC BLOOD PRESSURE: 155 MMHG | DIASTOLIC BLOOD PRESSURE: 60 MMHG | TEMPERATURE: 97.6 F | WEIGHT: 207.6 LBS

## 2025-01-28 DIAGNOSIS — D47.1 MYELOPROLIFERATIVE NEOPLASM (HCC): Primary | ICD-10-CM

## 2025-01-28 PROCEDURE — 99213 OFFICE O/P EST LOW 20 MIN: CPT | Performed by: INTERNAL MEDICINE

## 2025-01-28 PROCEDURE — 1159F MED LIST DOCD IN RCRD: CPT | Performed by: INTERNAL MEDICINE

## 2025-01-28 PROCEDURE — G8417 CALC BMI ABV UP PARAM F/U: HCPCS | Performed by: INTERNAL MEDICINE

## 2025-01-28 PROCEDURE — 1036F TOBACCO NON-USER: CPT | Performed by: INTERNAL MEDICINE

## 2025-01-28 PROCEDURE — 99211 OFF/OP EST MAY X REQ PHY/QHP: CPT

## 2025-01-28 PROCEDURE — G8427 DOCREV CUR MEDS BY ELIG CLIN: HCPCS | Performed by: INTERNAL MEDICINE

## 2025-01-28 PROCEDURE — 3077F SYST BP >= 140 MM HG: CPT | Performed by: INTERNAL MEDICINE

## 2025-01-28 PROCEDURE — 1123F ACP DISCUSS/DSCN MKR DOCD: CPT | Performed by: INTERNAL MEDICINE

## 2025-01-28 PROCEDURE — 1126F AMNT PAIN NOTED NONE PRSNT: CPT | Performed by: INTERNAL MEDICINE

## 2025-01-28 PROCEDURE — 3078F DIAST BP <80 MM HG: CPT | Performed by: INTERNAL MEDICINE

## 2025-01-28 ASSESSMENT — PATIENT HEALTH QUESTIONNAIRE - PHQ9
1. LITTLE INTEREST OR PLEASURE IN DOING THINGS: NOT AT ALL
SUM OF ALL RESPONSES TO PHQ QUESTIONS 1-9: 0
2. FEELING DOWN, DEPRESSED OR HOPELESS: NOT AT ALL
SUM OF ALL RESPONSES TO PHQ9 QUESTIONS 1 & 2: 0

## 2025-01-28 NOTE — PROGRESS NOTES
MA Rooming Questions  Patient: Samuel Valdes  MRN: [id number]    Date: 1/28/2025        1. Do you have any new issues?   no         2. Do you need any refills on medications?    no    3. Have you had any imaging done since your last visit?   no    4. Have you been hospitalized or seen in the emergency room since your last visit here?   yes - Left hip replacement in October 5. Did the patient have a depression screening completed today? Yes    PHQ-9 Total Score: 0 (1/28/2025  9:42 AM)       PHQ-9 Given to (if applicable):               PHQ-9 Score (if applicable):                     [] Positive     []  Negative              Does question #9 need addressed (if applicable)                     [] Yes    []  No               Lizzeth Kelsey MA

## 2025-05-27 ENCOUNTER — OFFICE VISIT (OUTPATIENT)
Dept: ONCOLOGY | Age: 80
End: 2025-05-27
Payer: MEDICARE

## 2025-05-27 ENCOUNTER — HOSPITAL ENCOUNTER (OUTPATIENT)
Dept: INFUSION THERAPY | Age: 80
Discharge: HOME | End: 2025-05-27
Payer: MEDICARE

## 2025-05-27 VITALS
WEIGHT: 206 LBS | BODY MASS INDEX: 31.22 KG/M2 | OXYGEN SATURATION: 95 % | DIASTOLIC BLOOD PRESSURE: 69 MMHG | HEART RATE: 84 BPM | TEMPERATURE: 98.3 F | HEIGHT: 68 IN | SYSTOLIC BLOOD PRESSURE: 153 MMHG

## 2025-05-27 DIAGNOSIS — D47.1 MYELOPROLIFERATIVE NEOPLASM (HCC): Primary | ICD-10-CM

## 2025-05-27 PROCEDURE — G8427 DOCREV CUR MEDS BY ELIG CLIN: HCPCS | Performed by: INTERNAL MEDICINE

## 2025-05-27 PROCEDURE — 1159F MED LIST DOCD IN RCRD: CPT | Performed by: INTERNAL MEDICINE

## 2025-05-27 PROCEDURE — 99214 OFFICE O/P EST MOD 30 MIN: CPT | Performed by: INTERNAL MEDICINE

## 2025-05-27 PROCEDURE — 3077F SYST BP >= 140 MM HG: CPT | Performed by: INTERNAL MEDICINE

## 2025-05-27 PROCEDURE — 99212 OFFICE O/P EST SF 10 MIN: CPT

## 2025-05-27 PROCEDURE — G8417 CALC BMI ABV UP PARAM F/U: HCPCS | Performed by: INTERNAL MEDICINE

## 2025-05-27 PROCEDURE — 1126F AMNT PAIN NOTED NONE PRSNT: CPT | Performed by: INTERNAL MEDICINE

## 2025-05-27 PROCEDURE — 3078F DIAST BP <80 MM HG: CPT | Performed by: INTERNAL MEDICINE

## 2025-05-27 PROCEDURE — 1036F TOBACCO NON-USER: CPT | Performed by: INTERNAL MEDICINE

## 2025-05-27 PROCEDURE — 1123F ACP DISCUSS/DSCN MKR DOCD: CPT | Performed by: INTERNAL MEDICINE

## 2025-05-27 RX ORDER — SPIRONOLACTONE 25 MG/1
25 TABLET ORAL 2 TIMES DAILY
COMMUNITY
Start: 2025-03-18

## 2025-05-27 RX ORDER — FEBUXOSTAT 40 MG/1
40 TABLET, FILM COATED ORAL DAILY
COMMUNITY
Start: 2025-04-09

## 2025-05-27 RX ORDER — POTASSIUM CHLORIDE 1500 MG/1
60 TABLET, EXTENDED RELEASE ORAL DAILY
COMMUNITY
Start: 2025-03-20

## 2025-05-27 RX ORDER — PROBENECID AND COLCHICINE .5; 5 MG/1; MG/1
1 TABLET ORAL DAILY
COMMUNITY
Start: 2025-05-01

## 2025-05-27 RX ORDER — TORSEMIDE 20 MG/1
20 TABLET ORAL DAILY
COMMUNITY
Start: 2025-03-20

## 2025-05-27 NOTE — PROGRESS NOTES
MA/LPN Rooming Questions  Patient: Samuel Valdes  MRN: [id number]    Date: 5/27/2025        1. Do you have any new issues?   no         2. Do you need any refills on medications?    no    3. Have you had any imaging done since your last visit?   yes - labs @ [facility]     4. Have you been hospitalized or seen in the emergency room since your last visit here?   no    5. Did the patient have a depression screening completed today? No    No data recorded     PHQ-9 Given to (if applicable):               PHQ-9 Score (if applicable):                     [] Positive     []  Negative              Does question #9 need addressed (if applicable)                     [] Yes    []  No               Юлия Ivey CMA      
He denies any pain on today visit. No anxiety or depression. The rest of the systems are unremarkable.     Vital Signs:  BP (!) 153/69 (BP Site: Right Upper Arm, Patient Position: Sitting, BP Cuff Size: Medium Adult)   Pulse 84   Temp 98.3 °F (36.8 °C) (Temporal)   Ht 1.727 m (5' 8\")   Wt 93.4 kg (206 lb)   SpO2 95%   BMI 31.32 kg/m²     Physical Exam:  CONSTITUTIONAL: awake, no apparent distress, alert, cooperative,    EYES: pupils equal, sclera clear and conjunctiva normal, round and reactive to light,   ENT: without obvious abnormality, normocephalic, atraumatic  NECK: supple, symmetrical, no jugular venous distension and no carotid bruits   HEMATOLOGIC/LYMPHATIC: no cervical, supraclavicular or axillary lymphadenopathy   LUNGS: VBS, no wheezes, clear to auscultation, no rhonchi, no increased work of breathing, no crackles,   CARDIOVASCULAR: normal S1 and S2, regular rate and rhythm, no murmur noted  ABDOMEN: soft, non-distended, non-tender, normal bowel sounds x 4, no masses palpated, no hepatosplenomegaly   MUSCULOSKELETAL: full range of motion noted, tone is normal  NEUROLOGIC: awake, alert, oriented to name, place and time. Motor skills grossly intact.   SKIN: Normal skin color, texture, turgor and no jaundice. appears intact   EXTREMITIES: no leg swelling, no clubbing, no cyanosis, no LE edema,       Labs:  Hematology:  Lab Results   Component Value Date    WBC 15.1 (H) 06/11/2024    RBC 5.13 06/11/2024    HGB 14.4 06/11/2024    HCT 45.4 06/11/2024    MCV 88.5 06/11/2024    MCH 28.1 06/11/2024    MCHC 31.7 (L) 06/11/2024    RDW 17.1 (H) 06/11/2024     06/11/2024    MPV 9.7 06/11/2024    BANDSPCT 5 06/11/2024    BASOPCT 2.0 (H) 06/11/2024    LYMPHOPCT 7.0 (L) 06/11/2024    MONOPCT 3.0 06/11/2024    BANDABS 0.76 06/11/2024    EOSABS 0.6 06/11/2024    BASOSABS 0.3 06/11/2024    LYMPHSABS 1.1 06/11/2024    MONOSABS 0.5 06/11/2024    DIFFTYPE AUTOMATED DIFFERENTIAL 06/11/2024    ANISOCYTOSIS 1+

## 2025-06-10 DIAGNOSIS — R71.8 ELEVATED HEMATOCRIT: Primary | ICD-10-CM

## 2025-06-10 NOTE — PROGRESS NOTES
One time phlebotomy offered. Patient agreeable. Order placed for therapeutic phlebotomy: withdraw 500 mL blood x1 per physician instruction. Therapy plan added. Patient will be contacted with appointment time once scheduled at [facility] OP infusion. Voices understanding. No further needs addressed at this time.

## 2025-06-13 RX ORDER — 0.9 % SODIUM CHLORIDE 0.9 %
250 INTRAVENOUS SOLUTION INTRAVENOUS ONCE
OUTPATIENT
Start: 2025-06-13 | End: 2025-06-13

## 2025-06-14 ENCOUNTER — HOSPITAL ENCOUNTER (EMERGENCY)
Age: 80
Discharge: HOME | End: 2025-06-14
Attending: STUDENT IN AN ORGANIZED HEALTH CARE EDUCATION/TRAINING PROGRAM
Payer: MEDICARE

## 2025-06-14 ENCOUNTER — APPOINTMENT (OUTPATIENT)
Dept: GENERAL RADIOLOGY | Age: 80
End: 2025-06-14
Payer: MEDICARE

## 2025-06-14 VITALS
WEIGHT: 191 LBS | HEART RATE: 78 BPM | RESPIRATION RATE: 20 BRPM | TEMPERATURE: 98.2 F | BODY MASS INDEX: 29.04 KG/M2 | SYSTOLIC BLOOD PRESSURE: 134 MMHG | OXYGEN SATURATION: 96 % | DIASTOLIC BLOOD PRESSURE: 73 MMHG

## 2025-06-14 DIAGNOSIS — R73.9 HYPERGLYCEMIA: Primary | ICD-10-CM

## 2025-06-14 DIAGNOSIS — J18.9 PNEUMONIA OF BOTH LOWER LOBES DUE TO INFECTIOUS ORGANISM: ICD-10-CM

## 2025-06-14 LAB
ANION GAP SERPL CALCULATED.3IONS-SCNC: 15 MMOL/L (ref 9–17)
ARTERIAL PATENCY WRIST A: ABNORMAL
B-OH-BUTYR SERPL-MCNC: 2.27 MMOL/L (ref 0–0.27)
BASOPHILS # BLD: 0.41 K/UL
BASOPHILS NFR BLD: 3 % (ref 0–1)
BNP SERPL-MCNC: 387 PG/ML (ref 0–450)
BODY TEMPERATURE: 37
BUN SERPL-MCNC: 21 MG/DL (ref 7–20)
CALCIUM SERPL-MCNC: 9.9 MG/DL (ref 8.3–10.6)
CHLORIDE SERPL-SCNC: 99 MMOL/L (ref 99–110)
CO2 SERPL-SCNC: 21 MMOL/L (ref 21–32)
COHGB MFR BLD: 1.2 % (ref 0.5–1.5)
CREAT SERPL-MCNC: 1.2 MG/DL (ref 0.8–1.3)
EKG ATRIAL RATE: 83 BPM
EKG DIAGNOSIS: NORMAL
EKG P AXIS: 77 DEGREES
EKG P-R INTERVAL: 234 MS
EKG Q-T INTERVAL: 396 MS
EKG QRS DURATION: 88 MS
EKG QTC CALCULATION (BAZETT): 465 MS
EKG R AXIS: -85 DEGREES
EKG T AXIS: 47 DEGREES
EKG VENTRICULAR RATE: 83 BPM
EOSINOPHIL # BLD: 0.55 K/UL
EOSINOPHILS RELATIVE PERCENT: 4 % (ref 0–3)
ERYTHROCYTE [DISTWIDTH] IN BLOOD BY AUTOMATED COUNT: 16.1 % (ref 11.7–14.9)
GFR, ESTIMATED: 60 ML/MIN/1.73M2
GLUCOSE SERPL-MCNC: 321 MG/DL (ref 74–99)
HCO3 VENOUS: 20.8 MMOL/L (ref 22–29)
HCT VFR BLD AUTO: 46 % (ref 42–52)
HGB BLD-MCNC: 15.1 G/DL (ref 13.5–18)
IMM GRANULOCYTES # BLD AUTO: 0.54 K/UL
IMM GRANULOCYTES NFR BLD: 4 %
LYMPHOCYTES NFR BLD: 0.77 K/UL
LYMPHOCYTES RELATIVE PERCENT: 5 % (ref 24–44)
MCH RBC QN AUTO: 28 PG (ref 27–31)
MCHC RBC AUTO-ENTMCNC: 32.8 G/DL (ref 32–36)
MCV RBC AUTO: 85.3 FL (ref 78–100)
METHEMOGLOBIN: 0.4 % (ref 0.5–1.5)
MONOCYTES NFR BLD: 0.85 K/UL
MONOCYTES NFR BLD: 6 % (ref 0–5)
NEGATIVE BASE EXCESS, VEN: 3.2 MMOL/L (ref 0–3)
NEUTROPHILS NFR BLD: 80 % (ref 36–66)
NEUTS SEG NFR BLD: 12.31 K/UL
OXYHGB MFR BLD: 76.7 %
PCO2 VENOUS: 34.5 MM HG (ref 38–54)
PH VENOUS: 7.4 (ref 7.32–7.43)
PLATELET # BLD AUTO: 320 K/UL (ref 140–440)
PMV BLD AUTO: 10.4 FL (ref 7.5–11.1)
PO2 VENOUS: 39 MM HG (ref 23–48)
POTASSIUM SERPL-SCNC: 4.3 MMOL/L (ref 3.5–5.1)
RBC # BLD AUTO: 5.39 M/UL (ref 4.6–6.2)
SODIUM SERPL-SCNC: 135 MMOL/L (ref 136–145)
TROPONIN I SERPL HS-MCNC: 15 NG/L (ref 0–22)
WBC OTHER # BLD: 15.4 K/UL (ref 4–10.5)

## 2025-06-14 PROCEDURE — 82010 KETONE BODYS QUAN: CPT

## 2025-06-14 PROCEDURE — 85025 COMPLETE CBC W/AUTO DIFF WBC: CPT

## 2025-06-14 PROCEDURE — 71046 X-RAY EXAM CHEST 2 VIEWS: CPT

## 2025-06-14 PROCEDURE — 36415 COLL VENOUS BLD VENIPUNCTURE: CPT

## 2025-06-14 PROCEDURE — 80048 BASIC METABOLIC PNL TOTAL CA: CPT

## 2025-06-14 PROCEDURE — 83880 ASSAY OF NATRIURETIC PEPTIDE: CPT

## 2025-06-14 PROCEDURE — 82805 BLOOD GASES W/O2 SATURATION: CPT

## 2025-06-14 PROCEDURE — 84484 ASSAY OF TROPONIN QUANT: CPT

## 2025-06-14 PROCEDURE — 93010 ELECTROCARDIOGRAM REPORT: CPT | Performed by: INTERNAL MEDICINE

## 2025-06-14 PROCEDURE — 93005 ELECTROCARDIOGRAM TRACING: CPT | Performed by: STUDENT IN AN ORGANIZED HEALTH CARE EDUCATION/TRAINING PROGRAM

## 2025-06-14 PROCEDURE — 99285 EMERGENCY DEPT VISIT HI MDM: CPT

## 2025-06-14 RX ORDER — LEVOFLOXACIN 750 MG/1
750 TABLET, FILM COATED ORAL DAILY
Qty: 5 TABLET | Refills: 0 | Status: SHIPPED | OUTPATIENT
Start: 2025-06-14 | End: 2025-06-19

## 2025-06-14 ASSESSMENT — LIFESTYLE VARIABLES
HOW OFTEN DO YOU HAVE A DRINK CONTAINING ALCOHOL: NEVER
HOW MANY STANDARD DRINKS CONTAINING ALCOHOL DO YOU HAVE ON A TYPICAL DAY: PATIENT DOES NOT DRINK

## 2025-06-14 ASSESSMENT — PAIN SCALES - GENERAL: PAINLEVEL_OUTOF10: 0

## 2025-06-14 NOTE — ED PROVIDER NOTES
bpm.  TX interval 234 QRS 88  QTc 465.  No significant new acute ischemic ST changes.  No LVH.  Left axis.  As interpreted by me [CR]   0909 CBC shows WBC 15.4 otherwise unremarkable  BMP shows glucose 321 otherwise unremarkable  VBG shows normal pH.  Troponin within normal limits  BNP within normal limits  Beta hydroxybutyrate 2.27 [CR]   0926 CXR:IMPRESSION:  1. Subtle right base density which is similar to previous and as such may be   chronic. A basilar pneumonia is difficult to fully exclude in this location  2. Likely left base atelectasis   [CR]   0930 Discussed result with patient and family at bedside.  Agreeable for increasing fluids at home, denies any cough or fever.  Discussed we will treat for potential pneumonia and has appointment on Monday so they can follow-up regarding possible pneumonia as well as his elevated sugar [CR]      ED Course User Index  [CR] Diogo Garrison MD           Independent Imaging Interpretation by me: please seen ED course/above/below  EKG (if obtained): (All EKG's are interpreted by myself in the absence of a cardiologist) Please see ED course/above/below    Is this patient to be included in the SEP-1 Core Measure due to severe sepsis or septic shock?   No   Exclusion criteria - the patient is NOT to be included for SEP-1 Core Measure due to:  May have criteria for sepsis, but does not meet criteria for severe sepsis or septic shock      ED Medications administered this visit:    Medications - No data to display    DISCHARGE PRESCRIPTIONS: (None if blank)  New Prescriptions    LEVOFLOXACIN (LEVAQUIN) 750 MG TABLET    Take 1 tablet by mouth daily for 5 days       I have reviewed and interpreted all of the currently available lab results from this visit (if applicable):    Radiographs (if obtained):  Radiologist's Report Reviewed:  XR CHEST (2 VW)   Final Result        No results found.    LABS: (none if blank)  Labs Reviewed   CBC WITH AUTO DIFFERENTIAL - Abnormal;  [address]  [phone]          Disposition medications (if applicable):  New Prescriptions    LEVOFLOXACIN (LEVAQUIN) 750 MG TABLET    Take 1 tablet by mouth daily for 5 days     ED Provider Disposition Time  DISPOSITION Decision To Discharge 06/14/2025 09:31:30 AM   DISPOSITION CONDITION Stable                 Diogo Garrison MD  06/14/25 0934

## 2025-06-14 NOTE — DISCHARGE INSTRUCTIONS
Take the full course of antibiotics  Make sure to eat and drink plenty of fluids to stay well-hydrated.  Call and follow-up with your family doctor in the next 1-3 days  Return to the ED if your symptoms worsen or you feel you need to be reevaluated

## 2025-06-14 NOTE — ED NOTES
Peripheral IV placed in the left forearm using an 18 gauge catheter.   Site flushed with normal saline, and no complications noted. Saline locked.   IV Dressing clean, dry, and intact.

## 2025-06-14 NOTE — ED NOTES
The following labs were labeled with appropriate pt sticker and tubed to lab:     [x] Blue     [x] Lavender   [] on ice  [x] Green/yellow x2  [] Green/black [] on ice  [] Grey  [] on ice  [] Yellow  [x] Red  [] Pink  [] Type/ Screen  [] ABG  [x] VBG - on ice to respiratory     [] COVID-19 swab    [] Rapid  [] PCR  [] Flu swab  [] Peds Viral Panel     [] Urine Sample  [] Fecal Sample  [] Pelvic Cultures  [] Blood Cultures  [] X 2  [] STREP Cultures  [] Wound Cultures

## 2025-06-14 NOTE — ED TRIAGE NOTES
Patient presents to the ED via walk in from home.   Chief complaint: shortness of breath   Initial vital signs:   Vitals:    06/14/25 0747   BP: (!) 154/75   Pulse: 79   Resp: 19   Temp: 98.2 °F (36.8 °C)   SpO2: 96%      Patient reports shortness of breath started around this morning around 0700, reports high blood sugar, states he does not think he is a diabetic and has not been diagnosed as diabetic. Does not report cough, states he felt like he was going to pass out, very dry mouth, no appetite, denies constipation nausea,  diarrhea vomiting.   Patient reports no pain.

## 2025-06-14 NOTE — ED NOTES
Reviewed AVS with pt. Pt verbalizes understanding of follow up, prescription medication, dosage, usage, and pharmacy pickup.   Pt expressed no other concerns or needs at time of discharge.   Pt ambulatory out of ED in no apparent distress at time of discharge.

## (undated) DEVICE — KENDALL 500 SERIES DIAPHORETIC FOAM MONITORING ELECTRODE - TEAR DROP SHAPE ( 30/PK): Brand: KENDALL

## (undated) DEVICE — THE TORRENT IRRIGATION SCOPE CONNECTOR IS USED WITH THE TORRENT IRRIGATION TUBING TO PROVIDE IRRIGATION FLUIDS SUCH AS STERILE WATER DURING GASTROINTESTINAL ENDOSCOPIC PROCEDURES WHEN USED IN CONJUNCTION WITH AN IRRIGATION PUMP (OR ELECTROSURGICAL UNIT).: Brand: TORRENT

## (undated) DEVICE — TUBING, SUCTION, 3/16" X 6', STRAIGHT: Brand: MEDLINE

## (undated) DEVICE — LINE SAMP O2 6.5FT W/FEMALE CONN F/ADULT CAPNOLINE PLUS

## (undated) DEVICE — BW-412T DISP COMBO CLEANING BRUSH: Brand: SINGLE USE COMBINATION CLEANING BRUSH

## (undated) DEVICE — LINER SUCT CANSTR 1500CC SEMI RIG W/ POR HYDROPHOBIC SHUT

## (undated) DEVICE — JELLY LUBRICATING 3 GM BACTERIOSTATIC